# Patient Record
Sex: FEMALE | Race: WHITE | NOT HISPANIC OR LATINO
[De-identification: names, ages, dates, MRNs, and addresses within clinical notes are randomized per-mention and may not be internally consistent; named-entity substitution may affect disease eponyms.]

---

## 2017-02-01 ENCOUNTER — APPOINTMENT (OUTPATIENT)
Dept: CARDIOLOGY | Facility: CLINIC | Age: 82
End: 2017-02-01

## 2017-02-01 VITALS
DIASTOLIC BLOOD PRESSURE: 80 MMHG | WEIGHT: 202 LBS | BODY MASS INDEX: 34.49 KG/M2 | HEIGHT: 64 IN | SYSTOLIC BLOOD PRESSURE: 126 MMHG | HEART RATE: 77 BPM

## 2017-02-02 ENCOUNTER — RECORD ABSTRACTING (OUTPATIENT)
Age: 82
End: 2017-02-02

## 2017-02-02 DIAGNOSIS — Z92.89 PERSONAL HISTORY OF OTHER MEDICAL TREATMENT: ICD-10-CM

## 2017-02-02 DIAGNOSIS — Z98.890 OTHER SPECIFIED POSTPROCEDURAL STATES: ICD-10-CM

## 2017-02-02 DIAGNOSIS — I27.2 OTHER SECONDARY PULMONARY HYPERTENSION: ICD-10-CM

## 2017-06-07 ENCOUNTER — APPOINTMENT (OUTPATIENT)
Dept: CARDIOLOGY | Facility: CLINIC | Age: 82
End: 2017-06-07

## 2017-06-07 VITALS
HEART RATE: 77 BPM | BODY MASS INDEX: 34.83 KG/M2 | HEIGHT: 64 IN | WEIGHT: 204 LBS | SYSTOLIC BLOOD PRESSURE: 122 MMHG | DIASTOLIC BLOOD PRESSURE: 76 MMHG

## 2017-10-25 ENCOUNTER — APPOINTMENT (OUTPATIENT)
Dept: CARDIOLOGY | Facility: CLINIC | Age: 82
End: 2017-10-25

## 2017-10-25 VITALS
HEIGHT: 64 IN | SYSTOLIC BLOOD PRESSURE: 130 MMHG | WEIGHT: 204 LBS | HEART RATE: 72 BPM | BODY MASS INDEX: 34.83 KG/M2 | DIASTOLIC BLOOD PRESSURE: 80 MMHG

## 2017-10-28 ENCOUNTER — MOBILE ON CALL (OUTPATIENT)
Age: 82
End: 2017-10-28

## 2018-02-07 ENCOUNTER — APPOINTMENT (OUTPATIENT)
Dept: CARDIOLOGY | Facility: CLINIC | Age: 83
End: 2018-02-07

## 2018-02-07 VITALS
HEIGHT: 64 IN | WEIGHT: 202 LBS | BODY MASS INDEX: 34.49 KG/M2 | DIASTOLIC BLOOD PRESSURE: 80 MMHG | HEART RATE: 75 BPM | SYSTOLIC BLOOD PRESSURE: 128 MMHG

## 2018-04-25 ENCOUNTER — APPOINTMENT (OUTPATIENT)
Dept: CARDIOLOGY | Facility: CLINIC | Age: 83
End: 2018-04-25

## 2018-06-20 ENCOUNTER — APPOINTMENT (OUTPATIENT)
Dept: CARDIOLOGY | Facility: CLINIC | Age: 83
End: 2018-06-20

## 2018-06-20 VITALS
WEIGHT: 209 LBS | HEART RATE: 72 BPM | DIASTOLIC BLOOD PRESSURE: 76 MMHG | BODY MASS INDEX: 35.68 KG/M2 | SYSTOLIC BLOOD PRESSURE: 124 MMHG | HEIGHT: 64 IN

## 2018-09-27 ENCOUNTER — OUTPATIENT (OUTPATIENT)
Dept: OUTPATIENT SERVICES | Facility: HOSPITAL | Age: 83
LOS: 1 days | Discharge: HOME | End: 2018-09-27

## 2018-09-27 DIAGNOSIS — R74.8 ABNORMAL LEVELS OF OTHER SERUM ENZYMES: ICD-10-CM

## 2018-11-14 ENCOUNTER — MEDICATION RENEWAL (OUTPATIENT)
Age: 83
End: 2018-11-14

## 2019-02-27 ENCOUNTER — APPOINTMENT (OUTPATIENT)
Dept: CARDIOLOGY | Facility: CLINIC | Age: 84
End: 2019-02-27

## 2019-03-27 ENCOUNTER — APPOINTMENT (OUTPATIENT)
Dept: CARDIOLOGY | Facility: CLINIC | Age: 84
End: 2019-03-27

## 2019-03-27 VITALS
SYSTOLIC BLOOD PRESSURE: 132 MMHG | DIASTOLIC BLOOD PRESSURE: 80 MMHG | WEIGHT: 209 LBS | BODY MASS INDEX: 35.68 KG/M2 | HEIGHT: 64 IN | HEART RATE: 81 BPM

## 2019-03-27 DIAGNOSIS — Z87.891 PERSONAL HISTORY OF NICOTINE DEPENDENCE: ICD-10-CM

## 2019-03-27 DIAGNOSIS — I71.9 AORTIC ANEURYSM OF UNSPECIFIED SITE, W/OUT RUPTURE: ICD-10-CM

## 2019-03-27 NOTE — PHYSICAL EXAM
[General Appearance - Well Developed] : well developed [Normal Appearance] : normal appearance [Well Groomed] : well groomed [General Appearance - Well Nourished] : well nourished [No Deformities] : no deformities [General Appearance - In No Acute Distress] : no acute distress [Normal Conjunctiva] : the conjunctiva exhibited no abnormalities [Eyelids - No Xanthelasma] : the eyelids demonstrated no xanthelasmas [Normal Oral Mucosa] : normal oral mucosa [No Oral Pallor] : no oral pallor [No Oral Cyanosis] : no oral cyanosis [Normal Jugular Venous A Waves Present] : normal jugular venous A waves present [Normal Jugular Venous V Waves Present] : normal jugular venous V waves present [No Jugular Venous King A Waves] : no jugular venous king A waves [Respiration, Rhythm And Depth] : normal respiratory rhythm and effort [Exaggerated Use Of Accessory Muscles For Inspiration] : no accessory muscle use [Auscultation Breath Sounds / Voice Sounds] : lungs were clear to auscultation bilaterally [Heart Rate And Rhythm] : heart rate and rhythm were normal [Heart Sounds] : normal S1 and S2 [Murmurs] : no murmurs present [Abdomen Soft] : soft [Abdomen Tenderness] : non-tender [Abdomen Mass (___ Cm)] : no abdominal mass palpated [Abnormal Walk] : normal gait [Gait - Sufficient For Exercise Testing] : the gait was sufficient for exercise testing [Nail Clubbing] : no clubbing of the fingernails [Cyanosis, Localized] : no localized cyanosis [Petechial Hemorrhages (___cm)] : no petechial hemorrhages [Skin Color & Pigmentation] : normal skin color and pigmentation [] : no rash [No Venous Stasis] : no venous stasis [Skin Lesions] : no skin lesions [No Skin Ulcers] : no skin ulcer [No Xanthoma] : no  xanthoma was observed [Oriented To Time, Place, And Person] : oriented to person, place, and time [Affect] : the affect was normal [Mood] : the mood was normal [No Anxiety] : not feeling anxious

## 2019-03-31 NOTE — REASON FOR VISIT
[Follow-Up - Clinic] : a clinic follow-up of [Coronary Artery Disease] : coronary artery disease [FreeTextEntry2] : cad, aneursym [FreeTextEntry1] : s/p avr bioprosthetic\par echo 6/2016 normal prosthetic valve\par ef approx. 40%\par ekg lbbb\par Patient at the present time complains of  shortness of breath\par She denies chest pain\par Since her last visit there have been no new symptoms\par She denies presyncope, syncope, palpitations  or exertional chest discomfort.\par Review of systems is essentially negative\par no change in 4 mos\par no new complaints\par no chf\par stable\par obese\par no new complaints\par patient has developed alzhemer's\par brittany\par stable

## 2019-06-01 ENCOUNTER — OUTPATIENT (OUTPATIENT)
Dept: OUTPATIENT SERVICES | Facility: HOSPITAL | Age: 84
LOS: 1 days | End: 2019-06-01
Payer: MEDICARE

## 2019-06-08 ENCOUNTER — INPATIENT (INPATIENT)
Facility: HOSPITAL | Age: 84
LOS: 3 days | Discharge: NURSING, REHAB OR LONG-TERM CARE | End: 2019-06-12
Attending: INTERNAL MEDICINE | Admitting: INTERNAL MEDICINE
Payer: MEDICARE

## 2019-06-08 VITALS
TEMPERATURE: 96 F | OXYGEN SATURATION: 95 % | DIASTOLIC BLOOD PRESSURE: 73 MMHG | SYSTOLIC BLOOD PRESSURE: 131 MMHG | HEART RATE: 86 BPM | RESPIRATION RATE: 16 BRPM

## 2019-06-08 DIAGNOSIS — Z98.890 OTHER SPECIFIED POSTPROCEDURAL STATES: Chronic | ICD-10-CM

## 2019-06-08 DIAGNOSIS — Z90.49 ACQUIRED ABSENCE OF OTHER SPECIFIED PARTS OF DIGESTIVE TRACT: Chronic | ICD-10-CM

## 2019-06-08 LAB
ALBUMIN SERPL ELPH-MCNC: 3.5 G/DL — SIGNIFICANT CHANGE UP (ref 3.5–5.2)
ALP SERPL-CCNC: 54 U/L — SIGNIFICANT CHANGE UP (ref 30–115)
ALT FLD-CCNC: 24 U/L — SIGNIFICANT CHANGE UP (ref 0–41)
ANION GAP SERPL CALC-SCNC: 13 MMOL/L — SIGNIFICANT CHANGE UP (ref 7–14)
APPEARANCE UR: CLEAR — SIGNIFICANT CHANGE UP
AST SERPL-CCNC: 32 U/L — SIGNIFICANT CHANGE UP (ref 0–41)
BACTERIA # UR AUTO: ABNORMAL /HPF
BASE EXCESS BLDV CALC-SCNC: -0.5 MMOL/L — SIGNIFICANT CHANGE UP (ref -2–2)
BASOPHILS # BLD AUTO: 0.07 K/UL — SIGNIFICANT CHANGE UP (ref 0–0.2)
BASOPHILS NFR BLD AUTO: 0.6 % — SIGNIFICANT CHANGE UP (ref 0–1)
BILIRUB SERPL-MCNC: 0.9 MG/DL — SIGNIFICANT CHANGE UP (ref 0.2–1.2)
BILIRUB UR-MCNC: ABNORMAL
BUN SERPL-MCNC: 14 MG/DL — SIGNIFICANT CHANGE UP (ref 10–20)
CA-I SERPL-SCNC: 1.13 MMOL/L — SIGNIFICANT CHANGE UP (ref 1.12–1.3)
CALCIUM SERPL-MCNC: 8.5 MG/DL — SIGNIFICANT CHANGE UP (ref 8.5–10.1)
CHLORIDE SERPL-SCNC: 101 MMOL/L — SIGNIFICANT CHANGE UP (ref 98–110)
CO2 SERPL-SCNC: 21 MMOL/L — SIGNIFICANT CHANGE UP (ref 17–32)
COLOR SPEC: SIGNIFICANT CHANGE UP
CREAT SERPL-MCNC: 0.8 MG/DL — SIGNIFICANT CHANGE UP (ref 0.7–1.5)
DIFF PNL FLD: ABNORMAL
EOSINOPHIL # BLD AUTO: 0.14 K/UL — SIGNIFICANT CHANGE UP (ref 0–0.7)
EOSINOPHIL NFR BLD AUTO: 1.1 % — SIGNIFICANT CHANGE UP (ref 0–8)
EPI CELLS # UR: ABNORMAL /HPF
GAS PNL BLDV: 136 MMOL/L — SIGNIFICANT CHANGE UP (ref 136–145)
GAS PNL BLDV: SIGNIFICANT CHANGE UP
GLUCOSE SERPL-MCNC: 117 MG/DL — HIGH (ref 70–99)
GLUCOSE UR QL: NEGATIVE MG/DL — SIGNIFICANT CHANGE UP
HCO3 BLDV-SCNC: 25 MMOL/L — SIGNIFICANT CHANGE UP (ref 22–29)
HCT VFR BLD CALC: 43.2 % — SIGNIFICANT CHANGE UP (ref 37–47)
HCT VFR BLDA CALC: 48.5 % — HIGH (ref 34–44)
HGB BLD CALC-MCNC: 15.8 G/DL — SIGNIFICANT CHANGE UP (ref 14–18)
HGB BLD-MCNC: 15 G/DL — SIGNIFICANT CHANGE UP (ref 12–16)
IMM GRANULOCYTES NFR BLD AUTO: 0.4 % — HIGH (ref 0.1–0.3)
KETONES UR-MCNC: ABNORMAL
LACTATE BLDV-MCNC: 2.1 MMOL/L — HIGH (ref 0.5–1.6)
LEUKOCYTE ESTERASE UR-ACNC: NEGATIVE — SIGNIFICANT CHANGE UP
LYMPHOCYTES # BLD AUTO: 37.6 % — SIGNIFICANT CHANGE UP (ref 20.5–51.1)
LYMPHOCYTES # BLD AUTO: 4.59 K/UL — HIGH (ref 1.2–3.4)
MCHC RBC-ENTMCNC: 30.6 PG — SIGNIFICANT CHANGE UP (ref 27–31)
MCHC RBC-ENTMCNC: 34.7 G/DL — SIGNIFICANT CHANGE UP (ref 32–37)
MCV RBC AUTO: 88.2 FL — SIGNIFICANT CHANGE UP (ref 81–99)
MONOCYTES # BLD AUTO: 1.1 K/UL — HIGH (ref 0.1–0.6)
MONOCYTES NFR BLD AUTO: 9 % — SIGNIFICANT CHANGE UP (ref 1.7–9.3)
NEUTROPHILS # BLD AUTO: 6.25 K/UL — SIGNIFICANT CHANGE UP (ref 1.4–6.5)
NEUTROPHILS NFR BLD AUTO: 51.3 % — SIGNIFICANT CHANGE UP (ref 42.2–75.2)
NITRITE UR-MCNC: NEGATIVE — SIGNIFICANT CHANGE UP
NRBC # BLD: 0 /100 WBCS — SIGNIFICANT CHANGE UP (ref 0–0)
PCO2 BLDV: 42 MMHG — SIGNIFICANT CHANGE UP (ref 41–51)
PH BLDV: 7.38 — SIGNIFICANT CHANGE UP (ref 7.26–7.43)
PH UR: 5 — SIGNIFICANT CHANGE UP (ref 5–8)
PLATELET # BLD AUTO: 149 K/UL — SIGNIFICANT CHANGE UP (ref 130–400)
PO2 BLDV: 38 MMHG — SIGNIFICANT CHANGE UP (ref 20–40)
POTASSIUM BLDV-SCNC: 3.7 MMOL/L — SIGNIFICANT CHANGE UP (ref 3.3–5.6)
POTASSIUM SERPL-MCNC: 4.6 MMOL/L — SIGNIFICANT CHANGE UP (ref 3.5–5)
POTASSIUM SERPL-SCNC: 4.6 MMOL/L — SIGNIFICANT CHANGE UP (ref 3.5–5)
PROT SERPL-MCNC: 7.6 G/DL — SIGNIFICANT CHANGE UP (ref 6–8)
PROT UR-MCNC: ABNORMAL MG/DL
RBC # BLD: 4.9 M/UL — SIGNIFICANT CHANGE UP (ref 4.2–5.4)
RBC # FLD: 15.1 % — HIGH (ref 11.5–14.5)
RBC CASTS # UR COMP ASSIST: SIGNIFICANT CHANGE UP /HPF
SAO2 % BLDV: 72 % — SIGNIFICANT CHANGE UP
SODIUM SERPL-SCNC: 135 MMOL/L — SIGNIFICANT CHANGE UP (ref 135–146)
SP GR SPEC: 1.02 — SIGNIFICANT CHANGE UP (ref 1.01–1.03)
TROPONIN T SERPL-MCNC: <0.01 NG/ML — SIGNIFICANT CHANGE UP
UROBILINOGEN FLD QL: 1 MG/DL (ref 0.2–0.2)
WBC # BLD: 12.2 K/UL — HIGH (ref 4.8–10.8)
WBC # FLD AUTO: 12.2 K/UL — HIGH (ref 4.8–10.8)
WBC UR QL: SIGNIFICANT CHANGE UP /HPF

## 2019-06-08 PROCEDURE — 99285 EMERGENCY DEPT VISIT HI MDM: CPT

## 2019-06-08 PROCEDURE — 70450 CT HEAD/BRAIN W/O DYE: CPT | Mod: 26

## 2019-06-08 PROCEDURE — 71045 X-RAY EXAM CHEST 1 VIEW: CPT | Mod: 26

## 2019-06-08 PROCEDURE — 93010 ELECTROCARDIOGRAM REPORT: CPT

## 2019-06-08 RX ORDER — ENOXAPARIN SODIUM 100 MG/ML
40 INJECTION SUBCUTANEOUS DAILY
Refills: 0 | Status: DISCONTINUED | OUTPATIENT
Start: 2019-06-08 | End: 2019-06-12

## 2019-06-08 RX ORDER — FUROSEMIDE 40 MG
20 TABLET ORAL EVERY 24 HOURS
Refills: 0 | Status: DISCONTINUED | OUTPATIENT
Start: 2019-06-08 | End: 2019-06-09

## 2019-06-08 RX ORDER — LISINOPRIL 2.5 MG/1
10 TABLET ORAL DAILY
Refills: 0 | Status: DISCONTINUED | OUTPATIENT
Start: 2019-06-08 | End: 2019-06-12

## 2019-06-08 RX ORDER — SODIUM CHLORIDE 9 MG/ML
500 INJECTION, SOLUTION INTRAVENOUS ONCE
Refills: 0 | Status: COMPLETED | OUTPATIENT
Start: 2019-06-08 | End: 2019-06-08

## 2019-06-08 RX ORDER — ASPIRIN/CALCIUM CARB/MAGNESIUM 324 MG
81 TABLET ORAL DAILY
Refills: 0 | Status: DISCONTINUED | OUTPATIENT
Start: 2019-06-08 | End: 2019-06-12

## 2019-06-08 RX ORDER — LANOLIN ALCOHOL/MO/W.PET/CERES
5 CREAM (GRAM) TOPICAL AT BEDTIME
Refills: 0 | Status: DISCONTINUED | OUTPATIENT
Start: 2019-06-08 | End: 2019-06-12

## 2019-06-08 RX ORDER — TIOTROPIUM BROMIDE 18 UG/1
1 CAPSULE ORAL; RESPIRATORY (INHALATION) DAILY
Refills: 0 | Status: DISCONTINUED | OUTPATIENT
Start: 2019-06-08 | End: 2019-06-12

## 2019-06-08 RX ORDER — CARVEDILOL PHOSPHATE 80 MG/1
6.25 CAPSULE, EXTENDED RELEASE ORAL DAILY
Refills: 0 | Status: DISCONTINUED | OUTPATIENT
Start: 2019-06-08 | End: 2019-06-12

## 2019-06-08 RX ORDER — SERTRALINE 25 MG/1
50 TABLET, FILM COATED ORAL DAILY
Refills: 0 | Status: DISCONTINUED | OUTPATIENT
Start: 2019-06-08 | End: 2019-06-12

## 2019-06-08 RX ORDER — CIPROFLOXACIN LACTATE 400MG/40ML
500 VIAL (ML) INTRAVENOUS ONCE
Refills: 0 | Status: COMPLETED | OUTPATIENT
Start: 2019-06-08 | End: 2019-06-08

## 2019-06-08 RX ADMIN — SODIUM CHLORIDE 500 MILLILITER(S): 9 INJECTION, SOLUTION INTRAVENOUS at 17:13

## 2019-06-08 RX ADMIN — Medication 500 MILLIGRAM(S): at 19:29

## 2019-06-08 RX ADMIN — Medication 5 MILLIGRAM(S): at 23:20

## 2019-06-08 NOTE — ED PROVIDER NOTE - CLINICAL SUMMARY MEDICAL DECISION MAKING FREE TEXT BOX
pt here for confusion/hallucinations. no severe lyte abnormality, no clear source of infection- ua w/o leuks/nitrite although few bacteria, ctx sent, recent course of cipro. pt unstafe to d/c home, will admit for further w/u, placement

## 2019-06-08 NOTE — PATIENT PROFILE ADULT - NSTOBACCO TYPE_GEN_A_CORE_RD
FINAL REPORT



PROCEDURE:  XR CHEST 1V AP



TECHNIQUE:  Chest radiograph anteroposterior view. CPT 37877







HISTORY:  ETT placement 



COMPARISON:  No prior studies are available for comparison.



FINDINGS:  

Heart: Prominent cardiac silhouette 



Mediastinum/Vessels: Prominent central vessels.



Lungs/Pleural space: Normal. Endotracheal tube tip projects 2.6

centimeters superior to the jorge 



Bony thorax: No acute osseous abnormality.



Life support devices: None.



IMPRESSION:  

Prominent central vessels. Endotracheal tube tip projects 2.6

centimeters superior to the jorge.
Cigarettes

## 2019-06-08 NOTE — H&P ADULT - NSICDXPASTMEDICALHX_GEN_ALL_CORE_FT
PAST MEDICAL HISTORY:  Congestive heart failure     COPD (chronic obstructive pulmonary disease)     Dementia     Hyperlipidemia     Hypertension     Metabolic encephalopathy     Urinary tract infection PAST MEDICAL HISTORY:  Congestive heart failure     COPD (chronic obstructive pulmonary disease)     Dementia     History of left bundle branch block     Hyperlipidemia     Hypertension     Metabolic encephalopathy     Obstructive sleep apnea     Presenile dementia with depression     Urinary tract infection

## 2019-06-08 NOTE — H&P ADULT - NSHPLABSRESULTS_GEN_ALL_CORE
Labs                          15.0   12.20 )-----------( 149      ( 2019 15:35 )             43.2     -    135  |  101  |  14  ----------------------------<  117<H>  4.6   |  21  |  0.8    Ca    8.5      2019 15:35    TPro  7.6  /  Alb  3.5  /  TBili  0.9  /  DBili  x   /  AST  32  /  ALT  24  /  AlkPhos  54      Troponin T, Serum (19 @ 15:35)    Troponin T, Serum: <0.01 ng/mL    Blood Gas Venous - Lactate (19 @ 16:54)    Blood Gas Venous - Lactate: 2.1 mmoL/L    Blood Gas Profile - Venous (19 @ 16:54)    pH, Venous: 7.38    pCO2, Venous: 42 mmHg    pO2, Venous: 38 mmHg    HCO3, Venous: 25 mmoL/L    Base Excess, Venous: -0.5 mmoL/L    Oxygen Saturation, Venous: 72 %    Urinalysis Basic - ( 2019 17:50 )  Color: Dark Yellow / Appearance: Clear / S.025 / pH: x  Gluc: x / Ketone: Trace  / Bili: Small / Urobili: 1.0 mg/dL   Blood: x / Protein: Trace mg/dL / Nitrite: Negative   Leuk Esterase: Negative / RBC: 1-2 /HPF / WBC 1-2 /HPF   Sq Epi: x / Non Sq Epi: Few /HPF / Bacteria: Few /HPF      Radiology  < from: CT Head No Cont (19 @ 18:01) >  IMPRESSION:   1.  No evidence of acute intracranial hemorrhage, mass effect or territorial infarct.  2.  Moderate chronic microvascular changes and chronic lacunar infarcts.  < end of copied text >

## 2019-06-08 NOTE — ED PROVIDER NOTE - ATTENDING CONTRIBUTION TO CARE
85 yo f hx chf, copd, dementia, htn, hld  pt was dx monday over the phone by PMD and was started on ?abx  pt with greenish urine, hallucinating, acting bizarrely   pt lives alone, with aid 8hrs/day  pt without complaints given dementia. daughter states she is concerned patient is risk to self given limited aid hours and dementia.     vss  gen- NAD, aao- self/place  card-rrr  lungs-ctab, no wheezing or rhonchi  abd-sntnd, no guarding or rebound  neuro- full str/sensation, cn ii-xii grossly intact, normal coordination, no fnd    possible metabolic encephalopathy, progression of dementia, infection  will get basic labs, cxr, ua, gentle fluids, cth, admit

## 2019-06-08 NOTE — H&P ADULT - ATTENDING COMMENTS
Patient is seen and examined independently at bedside. I agree with the resident's note, history and plan as above. Spoke with daughter at bedside. Pt lives alone currently without help. Pt's daughter lives far but visit her mother multiple times during the day to help with the needs and occasional get a neighbor friend to watch her. Pt was diagnosed with dementia by depression 9 years ago. Pt had initial improvement in mental status since pt was started on zoloft 9 years back. For past 8 months, pt has progressive decline in her mental status. Pt is becoming more forgetful, has urinary incontinence at baseline but currently having fecal incontinence with feces found around the house.  However, pt is still able to recognize family members, ambulatory but find difficulty with stairs. The reason pt's daughter brought pt to hospital was because pt was found wandering outside of the house. Pt's daughter had difficulty getting patient out of the house for further neuropsychiatric evaluation, the medication has not been changed since 9 years ago and even to get blood work was difficult for the family.    T(F): 96.3 (06-08-19 @ 21:17), Max: 98.6 (06-08-19 @ 15:16)  HR: 80 (06-08-19 @ 21:17) (65 - 86)  BP: 133/68 (06-08-19 @ 21:17) (130/94 - 133/68)  RR: 18 (06-08-19 @ 21:17) (16 - 18)  SpO2: 96% (06-08-19 @ 21:30) (95% - 98%)    PHYSICAL EXAM:  CONSTITUTIONAL: NAD, well-groomed, well-developed.  HEAD:  Atraumatic, Normocephalic.  EYES: EOMI, PERRLA, conjunctiva and sclera clear.  ENMT: Moist mucous membranes, No lesions.  NERVOUS SYSTEM:  Alert & Oriented X1, Good concentration; No limb weakness or numbness. Follows commands.  RESPIRATORY: Clear to ascultation bilaterally; No rales, rhonchi, wheezing, or rubs.  CARDIOVASCULAR: Regular rate and rhythm; No murmurs, rubs, or gallops.  GASTROINTESTINAL: Soft, Nontender, Nondistended;   MUSCULOSKELETAL: No joint swelling or tenderness. No neck or back pain.  EXTREMITIES: No clubbing, cyanosis, or edema.  LYMPH: No cervical lymphadenopathy noted.  SKIN: No rashes or lesions.    # Inability to care for oneself, worsening forgetfulness  - worsening dementia vs. metabolic encephalopathy 2/2 recent infection vs. underlying psychiatric disorder  - check TSH, ammonia, vitamin B12  - neurology and psychiatric evaluation to check if there is reversible causes or if medication adjustment needed  - social work/case management evaluation for placement, pt's daughter would like to keep the patient at home if possible if she can get help    # Recent UTI - finished 5 day course of ciprofloxacin, monitor for symptoms  # Congestive heart failure, unknown EF, stable - c/w lasix 3 times a week  # COPD, stable - c/w spiriva  # JAVI - unable to tolerate CPAP  # HTN - c/w ACE-inhibitor and carvedilol  # Depression - c/w sertraline    All labs, radiologic studies, vitals, orders and medications list reviewed.

## 2019-06-08 NOTE — ED ADULT TRIAGE NOTE - CHIEF COMPLAINT QUOTE
As per EMS, pt diagnosed with UTI on Monday (started on abx) c/o weakness and change in mental status. Hx of dementia, as per family, worsening mental status since Thursday. Pt A&Ox2

## 2019-06-08 NOTE — H&P ADULT - NSHPOUTPATIENTPROVIDERS_GEN_ALL_CORE
Titi Trejo [phone] Healthcare proxy  PMD: Dr Robb Hoffman  Cardio: Dr Shilo Kaur  Pulm: Dr Reynolds

## 2019-06-08 NOTE — ED ADULT NURSE NOTE - OBJECTIVE STATEMENT
pt brought in from home c.o. ams "she is seeing things that are not there and not being herself". s/p uti on abx. lives alone at home.

## 2019-06-08 NOTE — ED ADULT NURSE NOTE - PMH
Congestive heart failure    COPD (chronic obstructive pulmonary disease)    Dementia    History of left bundle branch block    Hyperlipidemia    Hypertension    Metabolic encephalopathy    Obstructive sleep apnea    Presenile dementia with depression    Urinary tract infection

## 2019-06-08 NOTE — ED PROVIDER NOTE - OBJECTIVE STATEMENT
83 yo female with PMHx COPD, CHF, dementia, HTN, HLD presents for change in mental status. Patient was dx with UTI on monday (PMD did not send urine but based on symptoms) and was started on ciprofloxacin. Patient has been having hallucinations and is agitated and more confused than normal. Patient lives at home with only 8 hour aid - family does not feel she is safe to be home on her own anymore. Patient denies any complaints and states she is fine and wants to go home. Patient/family denies falls, head trauma, syncope, fevers, chest pain, SOB, abdominal pain, nausea, vomiting, diarrhea, dizziness, weakness, urinary sx, cough, congestion, changes in PO intake, changes in urine output.

## 2019-06-08 NOTE — H&P ADULT - NSHPSOCIALHISTORY_GEN_ALL_CORE
Former smoker; quit 26 years ago  No alcohol intake  No illicit drugs    Lives alone, no one to help at home

## 2019-06-08 NOTE — H&P ADULT - ASSESSMENT
Dementia; Unable to care for herself    CHF  - Stable  - Weight daily, HOB elevated 30-45, fluid intake < 1.5L/day, low Na, aspiration precautions    COPD; stable    HTN    HLD    DVT ppx;  GI ppx; no indication  Activity; Ambulate as tolerated, fall precautions  Dispo; 24 hours 84 year old female with a past medical history of Dementia (8 years), Depression, JAVI, COPD, CHF, history of LBBB, recent Metabolic encephalopathy secondary to UTI treated with Cipro with improvement, presenting for change in her baseline mental status.    Dementia with Depression (for the past 8 years); Unable to care for herself  - No source of infection; treated for UTI recently  - ED: LR 500cc  - Clinically and hemodynamically stable  - CTH negative  - PT/OT/PMR, Social work consult    Recent UTI; 5 days of Cipro complete with one stat dose of Cipro.    CHF, unknown if systolic or diastolic  - Stable. Follows with cardiologist.  - Lasix 20mg every other day (3 times a week)  - Weight daily, HOB elevated 30-45, fluid intake < 1.5L/day, low Na, aspiration precautions    COPD; patient has not been taking her Spiriva for 2 years due to insurance issues. Currently COPD is stable. Discharge with Pulmonary follow up Dr Spence or Dr Josse Sutherland    JAVI; patient has not been able to tolerate CPAP 2 years prior. Needs outpatient Pulmonary follow up for new masks and new recommendations that the patient might be compliant with.    HTN; lisinopril 10, carvedilol 6.25 once a day    HLD; was on simvastatin but had elevated LFTs. Proper diet.    Depression; Sertraline    Med rec confirmed with patient's daughter  DVT ppx; Lovenox  GI ppx; no indication  Activity; Ambulate as tolerated, fall precautions  Dispo; 24 hours

## 2019-06-08 NOTE — H&P ADULT - NSHPPHYSICALEXAM_GEN_ALL_CORE
Vital Signs Last 24 Hrs  T(C): 37 (08 Jun 2019 15:16), Max: 37 (08 Jun 2019 15:16)  T(F): 98.6 (08 Jun 2019 15:16), Max: 98.6 (08 Jun 2019 15:16)  HR: 65 (08 Jun 2019 15:16) (65 - 86)  BP: 130/94 (08 Jun 2019 15:16) (130/94 - 131/73)  BP(mean): --  RR: 18 (08 Jun 2019 15:16) (16 - 18)  SpO2: 98% (08 Jun 2019 15:16) (95% - 98%)    Gen: NAD  HEENT: NCAT, EOMI, PERRLA  Cardiac: S1 S2, RRR  Chest: CTAB, GBAE  Abd: +BS, soft, NTND  Ext: +2 PP b/l, -ve LLE b/l  Neuro: AAOx3 Vital Signs Last 24 Hrs  T(C): 37 (08 Jun 2019 15:16), Max: 37 (08 Jun 2019 15:16)  T(F): 98.6 (08 Jun 2019 15:16), Max: 98.6 (08 Jun 2019 15:16)  HR: 65 (08 Jun 2019 15:16) (65 - 86)  BP: 130/94 (08 Jun 2019 15:16) (130/94 - 131/73)  BP(mean): --  RR: 18 (08 Jun 2019 15:16) (16 - 18)  SpO2: 98% (08 Jun 2019 15:16) (95% - 98%)    Gen: NAD  HEENT: NCAT, EOMI, PERRLA  Cardiac: S1 S2, RRR  Chest: CTAB, GBAE  Abd: +BS, soft, NTND  Ext: +2 PP b/l, -ve LLE b/l  Neuro: AAOx1 (knows person), no focal deficits

## 2019-06-08 NOTE — ED PROVIDER NOTE - PHYSICAL EXAMINATION
GEN: Well appearing, in no apparent distress.    HEAD:  Normocephalic, atraumatic.    EYES:  Clear conjunctivae without injection, drainage or discharge.    ENMT:  Moist MM.    NECK:  Supple, no masses. Normal ROM.    CARDIAC:  RRR, normal S1 and S2, no murmurs, rubs or gallops.    RESP:  Respiratory rate and effort appear normal; lungs are clear to auscultation bilaterally; no rhonchi, rales or wheezes.    ABDOMEN:  Soft, non-tender, non-distended, no masses. Normal BS throughout.    MUSCULOSKELETAL: No leg swelling, no calf tenderness. Patient able to move all 4 extremities.  NEURO:  AAO x 2 (baseline). Motor 5/5. Sensory intact. CN II – XII intact.     SKIN:  Normal skin color for age and race, well-perfused; warm and dry.

## 2019-06-08 NOTE — H&P ADULT - HISTORY OF PRESENT ILLNESS
84 year old female with a past medical history of Dementia (8 years), Depression, JAVI, COPD, CHF, history of LBBB, recent Metabolic encephalopathy secondary to UTI treated with Cipro with improvement, presenting for change in her baseline mental status. Patient's daughter reports that she has been constantly sleeping for the past 2 weeks more than usual, with decreased strength to move from bed to chair.  Daughter spoke to PMD, patient was started on Ciprofloxacin for UTI. Daughter reports that patient had dysuria.  After the cipro, patient's urine's color and smell retuned to normal. Patient's daughter reports that she had some episodes of hallucinations and was not acting like herself described as "worsening dementia" and described as "not her normal dementia". Denies fever, chills, nausea, vomiting, diarrhea, decreased PO intake, chest pain, palpitations, shortness of breath, abdominal pain, lower extremity swelling/pain/tenderness. Daughter reports some difficulty going up and down the stairs.

## 2019-06-09 LAB
ALBUMIN SERPL ELPH-MCNC: 3.3 G/DL — LOW (ref 3.5–5.2)
ALP SERPL-CCNC: 62 U/L — SIGNIFICANT CHANGE UP (ref 30–115)
ALT FLD-CCNC: 22 U/L — SIGNIFICANT CHANGE UP (ref 0–41)
AMMONIA BLD-MCNC: 30 UMOL/L — SIGNIFICANT CHANGE UP (ref 11–55)
ANION GAP SERPL CALC-SCNC: 13 MMOL/L — SIGNIFICANT CHANGE UP (ref 7–14)
APTT BLD: 34.8 SEC — SIGNIFICANT CHANGE UP (ref 27–39.2)
AST SERPL-CCNC: 24 U/L — SIGNIFICANT CHANGE UP (ref 0–41)
BASOPHILS # BLD AUTO: 0.05 K/UL — SIGNIFICANT CHANGE UP (ref 0–0.2)
BASOPHILS NFR BLD AUTO: 0.5 % — SIGNIFICANT CHANGE UP (ref 0–1)
BILIRUB SERPL-MCNC: 0.7 MG/DL — SIGNIFICANT CHANGE UP (ref 0.2–1.2)
BUN SERPL-MCNC: 14 MG/DL — SIGNIFICANT CHANGE UP (ref 10–20)
CALCIUM SERPL-MCNC: 8.7 MG/DL — SIGNIFICANT CHANGE UP (ref 8.5–10.1)
CHLORIDE SERPL-SCNC: 99 MMOL/L — SIGNIFICANT CHANGE UP (ref 98–110)
CO2 SERPL-SCNC: 23 MMOL/L — SIGNIFICANT CHANGE UP (ref 17–32)
CREAT SERPL-MCNC: 0.8 MG/DL — SIGNIFICANT CHANGE UP (ref 0.7–1.5)
EOSINOPHIL # BLD AUTO: 0.15 K/UL — SIGNIFICANT CHANGE UP (ref 0–0.7)
EOSINOPHIL NFR BLD AUTO: 1.5 % — SIGNIFICANT CHANGE UP (ref 0–8)
GLUCOSE SERPL-MCNC: 153 MG/DL — HIGH (ref 70–99)
HCT VFR BLD CALC: 43.5 % — SIGNIFICANT CHANGE UP (ref 37–47)
HGB BLD-MCNC: 14.9 G/DL — SIGNIFICANT CHANGE UP (ref 12–16)
IMM GRANULOCYTES NFR BLD AUTO: 0.4 % — HIGH (ref 0.1–0.3)
INR BLD: 1.25 RATIO — SIGNIFICANT CHANGE UP (ref 0.65–1.3)
LYMPHOCYTES # BLD AUTO: 3.48 K/UL — HIGH (ref 1.2–3.4)
LYMPHOCYTES # BLD AUTO: 35.7 % — SIGNIFICANT CHANGE UP (ref 20.5–51.1)
MAGNESIUM SERPL-MCNC: 1.8 MG/DL — SIGNIFICANT CHANGE UP (ref 1.8–2.4)
MCHC RBC-ENTMCNC: 30.7 PG — SIGNIFICANT CHANGE UP (ref 27–31)
MCHC RBC-ENTMCNC: 34.3 G/DL — SIGNIFICANT CHANGE UP (ref 32–37)
MCV RBC AUTO: 89.5 FL — SIGNIFICANT CHANGE UP (ref 81–99)
MONOCYTES # BLD AUTO: 0.7 K/UL — HIGH (ref 0.1–0.6)
MONOCYTES NFR BLD AUTO: 7.2 % — SIGNIFICANT CHANGE UP (ref 1.7–9.3)
NEUTROPHILS # BLD AUTO: 5.34 K/UL — SIGNIFICANT CHANGE UP (ref 1.4–6.5)
NEUTROPHILS NFR BLD AUTO: 54.7 % — SIGNIFICANT CHANGE UP (ref 42.2–75.2)
NRBC # BLD: 0 /100 WBCS — SIGNIFICANT CHANGE UP (ref 0–0)
PLATELET # BLD AUTO: 125 K/UL — LOW (ref 130–400)
POTASSIUM SERPL-MCNC: 4.1 MMOL/L — SIGNIFICANT CHANGE UP (ref 3.5–5)
POTASSIUM SERPL-SCNC: 4.1 MMOL/L — SIGNIFICANT CHANGE UP (ref 3.5–5)
PROT SERPL-MCNC: 7.4 G/DL — SIGNIFICANT CHANGE UP (ref 6–8)
PROTHROM AB SERPL-ACNC: 14.4 SEC — HIGH (ref 9.95–12.87)
RBC # BLD: 4.86 M/UL — SIGNIFICANT CHANGE UP (ref 4.2–5.4)
RBC # FLD: 15.2 % — HIGH (ref 11.5–14.5)
SODIUM SERPL-SCNC: 135 MMOL/L — SIGNIFICANT CHANGE UP (ref 135–146)
WBC # BLD: 9.76 K/UL — SIGNIFICANT CHANGE UP (ref 4.8–10.8)
WBC # FLD AUTO: 9.76 K/UL — SIGNIFICANT CHANGE UP (ref 4.8–10.8)

## 2019-06-09 PROCEDURE — 99222 1ST HOSP IP/OBS MODERATE 55: CPT

## 2019-06-09 RX ORDER — FUROSEMIDE 40 MG
20 TABLET ORAL
Refills: 0 | Status: DISCONTINUED | OUTPATIENT
Start: 2019-06-09 | End: 2019-06-12

## 2019-06-09 RX ADMIN — LISINOPRIL 10 MILLIGRAM(S): 2.5 TABLET ORAL at 05:40

## 2019-06-09 RX ADMIN — CARVEDILOL PHOSPHATE 6.25 MILLIGRAM(S): 80 CAPSULE, EXTENDED RELEASE ORAL at 05:40

## 2019-06-09 RX ADMIN — ENOXAPARIN SODIUM 40 MILLIGRAM(S): 100 INJECTION SUBCUTANEOUS at 11:12

## 2019-06-09 RX ADMIN — SERTRALINE 50 MILLIGRAM(S): 25 TABLET, FILM COATED ORAL at 11:12

## 2019-06-09 RX ADMIN — Medication 81 MILLIGRAM(S): at 11:12

## 2019-06-09 NOTE — CONSULT NOTE ADULT - ASSESSMENT
85 yo female with pmh as stated above presents with worsening mental status and UTI which is being treated. CTH is negative for acute ischemia.   Patient with worsening dementia 83 yo female with longstanding history of dementia currently with  worsening mental status and UTI which is being treated.  Likely toxic/metabolic encephalopathy with underlying moderate to advanced dementia.      Plan:  - continue UTI treatment as per primary team  - start Seroquel 25 mg QHS for agitation  - donepizil 10 mg QD  - check TSH, B12, folate, RPR  - PT eval  - call w/ questions  - f/u as outpt for titration of medications

## 2019-06-09 NOTE — CONSULT NOTE ADULT - SUBJECTIVE AND OBJECTIVE BOX
Neurology Consult    Patient is a 84y old  Female who presents with a chief complaint of unable to care for herself (2019 18:23)      HPI:  84 year old female with a past medical history of Dementia (8 years), Depression, JAVI, COPD, CHF, history of LBBB, recent Metabolic encephalopathy secondary to UTI treated with Cipro with improvement, presenting for change in her baseline mental status. Patient's daughter reports that she has been constantly sleeping for the past 2 weeks more than usual, with decreased strength to move from bed to chair.  Daughter spoke to PMD, patient was started on Ciprofloxacin for UTI. Daughter reports that patient had dysuria.  After the cipro, patient's urine's color and smell retuned to normal. Patient's daughter reports that she had some episodes of hallucinations and was not acting like herself described as "worsening dementia" and described as "not her normal dementia". Denies fever, chills, nausea, vomiting, diarrhea, decreased PO intake, chest pain, palpitations, shortness of breath, abdominal pain, lower extremity swelling/pain/tenderness. Daughter reports some difficulty going up and down the stairs. (2019 18:23)    Patient is examine at the bedside. She is aaox2, to herself and place. She is able to follow commands, able to name and repeat but does not remember things, could not name the president and current months or date. She is moving all four extremities slightly weakener on the right. Denies headache, dizziness or LOB.  As per daughter was found outside the house screaming at the neighbor very agitated. Lately she has been having visual hallucinations as well.    PAST MEDICAL & SURGICAL HISTORY:  Obstructive sleep apnea  Presenile dementia with depression  History of left bundle branch block  Metabolic encephalopathy  Urinary tract infection  Dementia  Hyperlipidemia  Hypertension  COPD (chronic obstructive pulmonary disease)  Congestive heart failure  S/P removal of ovarian cyst  H/O lumpectomy  History of appendectomy      FAMILY HISTORY:  FHx: lung cancer: father      Social History: (-) x 3    Allergies    No Known Allergies    Intolerances        MEDICATIONS  (STANDING):  aspirin enteric coated 81 milliGRAM(s) Oral daily  carvedilol 6.25 milliGRAM(s) Oral daily  enoxaparin Injectable 40 milliGRAM(s) SubCutaneous daily  furosemide    Tablet 20 milliGRAM(s) Oral <User Schedule>  lisinopril 10 milliGRAM(s) Oral daily  melatonin 5 milliGRAM(s) Oral at bedtime  sertraline 50 milliGRAM(s) Oral daily  tiotropium 18 MICROgram(s) Capsule 1 Capsule(s) Inhalation daily    MEDICATIONS  (PRN):      Review of systems:    Constitutional: No fever, weight loss or fatigue    Eyes: No eye pain or discharge  ENMT:  No difficulty hearing; No sinus or throat pain  Neck: No pain or stiffness  Respiratory: No cough, wheezing, chills or hemoptysis  Cardiovascular: No chest pain, palpitations, shortness of breath, dyspnea on exertion  Gastrointestinal: No abdominal pain, nausea, vomiting or hematemesis; No diarrhea or constipation.   Genitourinary: No dysuria, frequency, hematuria or incontinence  Neurological: As per HPI  Skin: No rashes or lesions   Endocrine: No heat or cold intolerance; No hair loss  Musculoskeletal: No joint pain or swelling  Psychiatric: No depression, anxiety, mood swings  Heme/Lymph: No easy bruising or bleeding gums    Vital Signs Last 24 Hrs  T(C): 35.7 (2019 21:17), Max: 37 (2019 15:16)  T(F): 96.3 (2019 21:17), Max: 98.6 (2019 15:16)  HR: 86 (2019 05:15) (65 - 86)  BP: 141/70 (2019 05:15) (130/94 - 141/70)  BP(mean): --  RR: 18 (2019 05:15) (16 - 18)  SpO2: 96% (2019 21:30) (95% - 98%)    Neurologic Examination:  General:  Appearance is consistent with chronologic age.  No abnormal facies.   General: The patient is oriented to person, place, time and date.  Recent and remote memory intact.  Fund of knowledge is intact and normal.  Language with normal repetition, comprehension and naming.  Nondysarthric.    Cranial nerves: intact VA, VFF.  EOMI w/o nystagmus, skew or reported double vision.  PERRL.  No ptosis/weakness of eyelid closure.  Facial sensation is normal with normal bite.  No facial asymmetry.  Hearing grossly intact b/l.  Palate elevates midline.  Tongue midline.  Motor examination:   Normal tone, bulk and range of motion.  No tenderness, twitching, tremors or involuntary movements.  Formal Muscle Strength Testing: (MRC grade R/L) 5/5 UE; 5/5 LE.  No observable drift.  Reflexes:   2+ b/l pectoralis, biceps, triceps, brachioradialis, patella and Achilles.  Plantar response downgoing b/l.  Jaw jerk, Karen, clonus absent.  Sensory examination:   Intact to light touch and pinprick, pain, temperature and proprioception and vibration in all extremities.  Cerebellum:   FTN/HKS intact with normal GINA in all limbs.  No dysmetria or dysdiadokinesia.  Gait narrow based and normal.    Labs:   CBC Full  -  ( 2019 15:35 )  WBC Count : 12.20 K/uL  RBC Count : 4.90 M/uL  Hemoglobin : 15.0 g/dL  Hematocrit : 43.2 %  Platelet Count - Automated : 149 K/uL  Mean Cell Volume : 88.2 fL  Mean Cell Hemoglobin : 30.6 pg  Mean Cell Hemoglobin Concentration : 34.7 g/dL  Auto Neutrophil # : 6.25 K/uL  Auto Lymphocyte # : 4.59 K/uL  Auto Monocyte # : 1.10 K/uL  Auto Eosinophil # : 0.14 K/uL  Auto Basophil # : 0.07 K/uL  Auto Neutrophil % : 51.3 %  Auto Lymphocyte % : 37.6 %  Auto Monocyte % : 9.0 %  Auto Eosinophil % : 1.1 %  Auto Basophil % : 0.6 %        135  |  101  |  14  ----------------------------<  117<H>  4.6   |  21  |  0.8    Ca    8.5      2019 15:35    TPro  7.6  /  Alb  3.5  /  TBili  0.9  /  DBili  x   /  AST  32  /  ALT  24  /  AlkPhos  54  06-08    LIVER FUNCTIONS - ( 2019 15:35 )  Alb: 3.5 g/dL / Pro: 7.6 g/dL / ALK PHOS: 54 U/L / ALT: 24 U/L / AST: 32 U/L / GGT: x             Urinalysis Basic - ( 2019 17:50 )    Color: Dark Yellow / Appearance: Clear / S.025 / pH: x  Gluc: x / Ketone: Trace  / Bili: Small / Urobili: 1.0 mg/dL   Blood: x / Protein: Trace mg/dL / Nitrite: Negative   Leuk Esterase: Negative / RBC: 1-2 /HPF / WBC 1-2 /HPF   Sq Epi: x / Non Sq Epi: Few /HPF / Bacteria: Few /HPF          Neuroimaging:  NCHCT: CT Head No Cont:   EXAM:  CT BRAIN            PROCEDURE DATE:  2019            INTERPRETATION:  Clinical History / Reason for exam: Altered mental   status.    Technique: Noncontrast head CT.  Contiguous unenhanced CT axial images of   the head from the base tothe vertex with coronal and sagittal   reformatted images.    Comparison: Noncontrast CT head 2012    Findings:    The ventricles, basal cisterns and sulcal pattern are prominent   consistent with parenchymal volume loss.    There are patchy hypodensities in the periventricular and subcortical   white matter without mass effect compatible with chronic microvascular   changes.    There are bilateral patchy hypodensities within the bilateral basal   ganglia consistent with chronic lacunar infarcts, mildly progressed since   the prior study.    There is no acute intracranial hemorrhage, extra-axial fluid collection   or midline shift.     Gray white matter differentiation is maintained.    There is calcific atherosclerotic disease at the skull base.    The calvarium is intact. The visualized paranasal sinuses and mastoid air   cells are unremarkable.        IMPRESSION:     1.  No evidence of acute intracranial hemorrhage, mass effect or   territorial infarct.    2.  Moderate chronic microvascular changes and chronic lacunar infarcts.              ERIC CAGLE M.D., RESIDENT RADIOLOGIST  This document has been electronically signed.  AGUSTIN KIM M.D., ATTENDING RADIOLOGIST  This document has been electronically signed. 2019  6:17PM             (19 @ 18:01)    CT Angiography/Perfusion:  MRI Brain NC:  MRA Head/Neck:  EEG:    Assessment:  This is a 84y Female with h/o     Plan:   -   19 @ 07:12 Neurology Consult    Patient is a 84y old  Female who presents with a chief complaint of unable to care for herself (2019 18:23)      HPI:  84 year old female with a past medical history of Dementia (8 years), Depression, JAVI, COPD, CHF, history of LBBB, recent Metabolic encephalopathy secondary to UTI treated with Cipro with improvement, presenting for change in her baseline mental status. Patient's daughter reports that she has been constantly sleeping for the past 2 weeks more than usual, with decreased strength to move from bed to chair.  Daughter spoke to PMD, patient was started on Ciprofloxacin for UTI. Daughter reports that patient had dysuria.  After the cipro, patient's urine's color and smell retuned to normal. Patient's daughter reports that she had some episodes of hallucinations and was not acting like herself described as "worsening dementia" and described as "not her normal dementia". Denies fever, chills, nausea, vomiting, diarrhea, decreased PO intake, chest pain, palpitations, shortness of breath, abdominal pain, lower extremity swelling/pain/tenderness. Daughter reports some difficulty going up and down the stairs. (2019 18:23)    Patient is examine at the bedside. She is aaox2, to herself and place. She is able to follow commands, able to name and repeat but does not remember things, could not name the president and current month or date. She is moving all four extremities slightly weakener on the right. Denies headache, dizziness or LOB.  As per daughter was found outside the house screaming at the neighbor very agitated. Lately she has been having visual hallucinations as well.  She was diagnosed with dementia 8 years ago and was on Zoloft since then.      PAST MEDICAL & SURGICAL HISTORY:  Obstructive sleep apnea  Presenile dementia with depression  History of left bundle branch block  Metabolic encephalopathy  Urinary tract infection  Dementia  Hyperlipidemia  Hypertension  COPD (chronic obstructive pulmonary disease)  Congestive heart failure  S/P removal of ovarian cyst  H/O lumpectomy  History of appendectomy      FAMILY HISTORY:  FHx: lung cancer: father      Social History: (-) x 3    Allergies    No Known Allergies    Intolerances        MEDICATIONS  (STANDING):  aspirin enteric coated 81 milliGRAM(s) Oral daily  carvedilol 6.25 milliGRAM(s) Oral daily  enoxaparin Injectable 40 milliGRAM(s) SubCutaneous daily  furosemide    Tablet 20 milliGRAM(s) Oral <User Schedule>  lisinopril 10 milliGRAM(s) Oral daily  melatonin 5 milliGRAM(s) Oral at bedtime  sertraline 50 milliGRAM(s) Oral daily  tiotropium 18 MICROgram(s) Capsule 1 Capsule(s) Inhalation daily    MEDICATIONS  (PRN):      Review of systems:    Constitutional: No fever, weight loss or fatigue    Eyes: No eye pain or discharge  ENMT:  No difficulty hearing; No sinus or throat pain  Neck: No pain or stiffness  Respiratory: No cough, wheezing, chills or hemoptysis  Cardiovascular: No chest pain, palpitations, shortness of breath, dyspnea on exertion  Gastrointestinal: No abdominal pain, nausea, vomiting or hematemesis; No diarrhea or constipation.   Genitourinary: No dysuria, frequency, hematuria or incontinence  Neurological: As per HPI  Skin: No rashes or lesions   Endocrine: No heat or cold intolerance; No hair loss  Musculoskeletal: No joint pain or swelling  Psychiatric: No depression, anxiety, mood swings  Heme/Lymph: No easy bruising or bleeding gums    Vital Signs Last 24 Hrs  T(C): 35.7 (2019 21:17), Max: 37 (2019 15:16)  T(F): 96.3 (2019 21:17), Max: 98.6 (2019 15:16)  HR: 86 (2019 05:15) (65 - 86)  BP: 141/70 (2019 05:15) (130/94 - 141/70)  BP(mean): --  RR: 18 (2019 05:15) (16 - 18)  SpO2: 96% (2019 21:30) (95% - 98%)    Neurologic Examination:  General:  Appearance is consistent with chronologic age.  No abnormal facies.   General: The patient is oriented to person, place, time and date.  Recent and remote memory impaired  Language with normal repetition, comprehension and naming.  Nondysarthric.    Cranial nerves: intact VA, VFF.  EOMI w/o nystagmus, skew or reported double vision.  PERRL.  No ptosis/weakness of eyelid closure.  Facial sensation is normal with normal bite.  No facial asymmetry.  Hearing grossly intact b/l.  Palate elevates midline.  Tongue midline.  Motor examination:   Normal tone, bulk and range of motion.  No tenderness, twitching, tremors or involuntary movements.  Formal Muscle Strength Testing: (MRC grade R/L) 5/5 UE; 5/5 LE.  mild drift on RUE  Sensory examination:   Intact to light touch in all four extr  Cerebellum:   FTN/HKS intact with normal GINA in all limbs.  No dysmetria    Labs:   CBC Full  -  ( 2019 15:35 )  WBC Count : 12.20 K/uL  RBC Count : 4.90 M/uL  Hemoglobin : 15.0 g/dL  Hematocrit : 43.2 %  Platelet Count - Automated : 149 K/uL  Mean Cell Volume : 88.2 fL  Mean Cell Hemoglobin : 30.6 pg  Mean Cell Hemoglobin Concentration : 34.7 g/dL  Auto Neutrophil # : 6.25 K/uL  Auto Lymphocyte # : 4.59 K/uL  Auto Monocyte # : 1.10 K/uL  Auto Eosinophil # : 0.14 K/uL  Auto Basophil # : 0.07 K/uL  Auto Neutrophil % : 51.3 %  Auto Lymphocyte % : 37.6 %  Auto Monocyte % : 9.0 %  Auto Eosinophil % : 1.1 %  Auto Basophil % : 0.6 %        135  |  101  |  14  ----------------------------<  117<H>  4.6   |  21  |  0.8    Ca    8.5      2019 15:35    TPro  7.6  /  Alb  3.5  /  TBili  0.9  /  DBili  x   /  AST  32  /  ALT  24  /  AlkPhos  54  06-08    LIVER FUNCTIONS - ( 2019 15:35 )  Alb: 3.5 g/dL / Pro: 7.6 g/dL / ALK PHOS: 54 U/L / ALT: 24 U/L / AST: 32 U/L / GGT: x             Urinalysis Basic - ( 2019 17:50 )    Color: Dark Yellow / Appearance: Clear / S.025 / pH: x  Gluc: x / Ketone: Trace  / Bili: Small / Urobili: 1.0 mg/dL   Blood: x / Protein: Trace mg/dL / Nitrite: Negative   Leuk Esterase: Negative / RBC: 1-2 /HPF / WBC 1-2 /HPF   Sq Epi: x / Non Sq Epi: Few /HPF / Bacteria: Few /HPF          Neuroimaging:  NCHCT: CT Head No Cont:   EXAM:  CT BRAIN            PROCEDURE DATE:  2019            INTERPRETATION:  Clinical History / Reason for exam: Altered mental   status.    Technique: Noncontrast head CT.  Contiguous unenhanced CT axial images of   the head from the base tothe vertex with coronal and sagittal   reformatted images.    Comparison: Noncontrast CT head 2012    Findings:    The ventricles, basal cisterns and sulcal pattern are prominent   consistent with parenchymal volume loss.    There are patchy hypodensities in the periventricular and subcortical   white matter without mass effect compatible with chronic microvascular   changes.    There are bilateral patchy hypodensities within the bilateral basal   ganglia consistent with chronic lacunar infarcts, mildly progressed since   the prior study.    There is no acute intracranial hemorrhage, extra-axial fluid collection   or midline shift.     Gray white matter differentiation is maintained.    There is calcific atherosclerotic disease at the skull base.    The calvarium is intact. The visualized paranasal sinuses and mastoid air   cells are unremarkable.        IMPRESSION:     1.  No evidence of acute intracranial hemorrhage, mass effect or   territorial infarct.    2.  Moderate chronic microvascular changes and chronic lacunar infarcts.              ERIC CAGLE M.D., RESIDENT RADIOLOGIST  This document has been electronically signed.  AGUSTIN KIM M.D., ATTENDING RADIOLOGIST  This document has been electronically signed. 2019  6:17PM             (19 @ 18:01)    CT Angiography/Perfusion:  MRI Brain NC:  MRA Head/Neck:  EEG:    Assessment:  This is a 84y Female with h/o     Plan:   -   19 @ 07:12 Neurology Consult    Patient is a 84y old  Female who presents with a chief complaint of unable to care for herself (2019 18:23)    HPI:  84 year old female with a past medical history of Dementia (8 years), Depression, JAVI, COPD, CHF, history of LBBB, recent Metabolic encephalopathy secondary to UTI treated with Cipro with improvement, presenting for change in her baseline mental status. Patient's daughter reports that she has been constantly sleeping for the past 2 weeks more than usual, with decreased strength to move from bed to chair.  Daughter spoke to PMD, patient was started on Ciprofloxacin for UTI. Daughter reports that patient had dysuria.  After the cipro, patient's urine's color and smell retuned to normal. Patient's daughter reports that she had some episodes of hallucinations and was not acting like herself described as "worsening dementia" and described as "not her normal dementia". Denies fever, chills, nausea, vomiting, diarrhea, decreased PO intake, chest pain, palpitations, shortness of breath, abdominal pain, lower extremity swelling/pain/tenderness. Daughter reports some difficulty going up and down the stairs. (2019 18:23)    Patient is examine at the bedside. She is aaox2, to herself and place. She is able to follow commands, able to name and repeat but does not remember things, could not name the president and current month or date. She is moving all four extremities slightly weakener on the right. Denies headache, dizziness or LOB.  As per daughter was found outside the house screaming at the neighbor very agitated. Lately she has been having visual hallucinations as well.  She was diagnosed with dementia 8 years ago and was on Zoloft since then.  Was last seen by Dr. Solano from neurology 8 years ago with diagnosis of dementia and has not had followup since then.  Has been progressively worse over the last few years now constantly sleeps during the daytime with agitation, confusion and paranoia at night.      PAST MEDICAL & SURGICAL HISTORY:  Obstructive sleep apnea  Presenile dementia with depression  History of left bundle branch block  Metabolic encephalopathy  Urinary tract infection  Dementia  Hyperlipidemia  Hypertension  COPD (chronic obstructive pulmonary disease)  Congestive heart failure  S/P removal of ovarian cyst  H/O lumpectomy  History of appendectomy    FAMILY HISTORY:  FHx: lung cancer: father    Social History: (-) x 3    Allergies    No Known Allergies    Intolerances    MEDICATIONS  (STANDING):  aspirin enteric coated 81 milliGRAM(s) Oral daily  carvedilol 6.25 milliGRAM(s) Oral daily  enoxaparin Injectable 40 milliGRAM(s) SubCutaneous daily  furosemide    Tablet 20 milliGRAM(s) Oral <User Schedule>  lisinopril 10 milliGRAM(s) Oral daily  melatonin 5 milliGRAM(s) Oral at bedtime  sertraline 50 milliGRAM(s) Oral daily  tiotropium 18 MICROgram(s) Capsule 1 Capsule(s) Inhalation daily    MEDICATIONS  (PRN):    Review of systems:    Constitutional: No fever, weight loss or fatigue    Eyes: No eye pain or discharge  ENMT:  No difficulty hearing; No sinus or throat pain  Neck: No pain or stiffness  Respiratory: No cough, wheezing, chills or hemoptysis  Cardiovascular: No chest pain, palpitations, shortness of breath, dyspnea on exertion  Gastrointestinal: No abdominal pain, nausea, vomiting or hematemesis; No diarrhea or constipation.   Genitourinary: No dysuria, frequency, hematuria or incontinence  Neurological: As per HPI  Skin: No rashes or lesions   Endocrine: No heat or cold intolerance; No hair loss  Musculoskeletal: No joint pain or swelling  Psychiatric: No depression, anxiety, mood swings  Heme/Lymph: No easy bruising or bleeding gums    Vital Signs Last 24 Hrs  T(C): 35.7 (2019 21:17), Max: 37 (2019 15:16)  T(F): 96.3 (2019 21:17), Max: 98.6 (2019 15:16)  HR: 86 (2019 05:15) (65 - 86)  BP: 141/70 (2019 05:15) (130/94 - 141/70)  BP(mean): --  RR: 18 (2019 05:15) (16 - 18)  SpO2: 96% (2019 21:30) (95% - 98%)    Neurologic Examination:  General:  Appearance is consistent with chronologic age.  No abnormal facies.   General: The patient is oriented to person, place, time and date.  Recent and remote memory impaired  Language with normal repetition, comprehension and naming.  Nondysarthric.    Cranial nerves: intact VA, VFF.  EOMI w/o nystagmus, skew or reported double vision.  PERRL.  No ptosis/weakness of eyelid closure.  Facial sensation is normal with normal bite.  No facial asymmetry.  Hearing grossly intact b/l.  Palate elevates midline.  Tongue midline.  Motor examination:   Normal tone, bulk and range of motion.  No tenderness, twitching, tremors or involuntary movements.  Formal Muscle Strength Testing: (MRC grade R/L) 5/5 UE; 5/5 LE.  mild drift on RUE  Sensory examination:   Intact to light touch in all four extr  Cerebellum:   FTN/HKS intact with normal GINA in all limbs.  No dysmetria    Labs:   CBC Full  -  ( 2019 15:35 )  WBC Count : 12.20 K/uL  RBC Count : 4.90 M/uL  Hemoglobin : 15.0 g/dL  Hematocrit : 43.2 %  Platelet Count - Automated : 149 K/uL  Mean Cell Volume : 88.2 fL  Mean Cell Hemoglobin : 30.6 pg  Mean Cell Hemoglobin Concentration : 34.7 g/dL  Auto Neutrophil # : 6.25 K/uL  Auto Lymphocyte # : 4.59 K/uL  Auto Monocyte # : 1.10 K/uL  Auto Eosinophil # : 0.14 K/uL  Auto Basophil # : 0.07 K/uL  Auto Neutrophil % : 51.3 %  Auto Lymphocyte % : 37.6 %  Auto Monocyte % : 9.0 %  Auto Eosinophil % : 1.1 %  Auto Basophil % : 0.6 %        135  |  101  |  14  ----------------------------<  117<H>  4.6   |  21  |  0.8    Ca    8.5      2019 15:35    TPro  7.6  /  Alb  3.5  /  TBili  0.9  /  DBili  x   /  AST  32  /  ALT  24  /  AlkPhos  54  06-08    LIVER FUNCTIONS - ( 2019 15:35 )  Alb: 3.5 g/dL / Pro: 7.6 g/dL / ALK PHOS: 54 U/L / ALT: 24 U/L / AST: 32 U/L / GGT: x             Urinalysis Basic - ( 2019 17:50 )    Color: Dark Yellow / Appearance: Clear / S.025 / pH: x  Gluc: x / Ketone: Trace  / Bili: Small / Urobili: 1.0 mg/dL   Blood: x / Protein: Trace mg/dL / Nitrite: Negative   Leuk Esterase: Negative / RBC: 1-2 /HPF / WBC 1-2 /HPF   Sq Epi: x / Non Sq Epi: Few /HPF / Bacteria: Few /HPF    Neuroimaging:  NCHCT: CT Head No Cont:   EXAM:  CT BRAIN          PROCEDURE DATE:  2019      INTERPRETATION:  Clinical History / Reason for exam: Altered mental   status.    Technique: Noncontrast head CT.  Contiguous unenhanced CT axial images of   the head from the base tothe vertex with coronal and sagittal   reformatted images.    Comparison: Noncontrast CT head 2012    Findings:    The ventricles, basal cisterns and sulcal pattern are prominent   consistent with parenchymal volume loss.    There are patchy hypodensities in the periventricular and subcortical   white matter without mass effect compatible with chronic microvascular   changes.    There are bilateral patchy hypodensities within the bilateral basal   ganglia consistent with chronic lacunar infarcts, mildly progressed since   the prior study.    There is no acute intracranial hemorrhage, extra-axial fluid collection   or midline shift.     Gray white matter differentiation is maintained.    There is calcific atherosclerotic disease at the skull base.    The calvarium is intact. The visualized paranasal sinuses and mastoid air   cells are unremarkable.        IMPRESSION:     1.  No evidence of acute intracranial hemorrhage, mass effect or   territorial infarct.    2.  Moderate chronic microvascular changes and chronic lacunar infarcts.    ERIC CAGLE M.D., RESIDENT RADIOLOGIST  This document has been electronically signed.  AGUSTIN KIM M.D., ATTENDING RADIOLOGIST  This document has been electronically signed. 2019  6:17PM         (19 @ 18:01)

## 2019-06-09 NOTE — PROGRESS NOTE ADULT - ASSESSMENT
84 year old female with a past medical history of Dementia (8 years), Depression, JAVI, COPD, CHF, history of LBBB, recent Metabolic encephalopathy secondary to UTI treated with Cipro with improvement, presenting for change in her baseline mental status.    Dementia with Depression (for the past 8 years); Unable to care for herself  - No source of infection; treated for UTI recently  - ED: received LR 500cc  - Clinically and hemodynamically stable at time of admission  - CTH negative  - PT/OT/PMR, Social work consult    Recent UTI; 5 days of Cipro complete with one stat dose of Cipro.    CHF, unknown if systolic or diastolic  - Stable. Follows with cardiologist.  - Lasix 20mg every other day (3 times a week)  - Weight daily, HOB elevated 30-45, fluid intake < 1.5L/day, low Na, aspiration precautions  - accurate I's and O's    COPD; patient has not been taking her Spiriva for 2 years due to insurance issues. Currently COPD is stable. Discharge with Pulmonary follow up Dr Spence or Dr Josse Sutherland    JAVI; patient has not been able to tolerate CPAP 2 years prior. Needs outpatient Pulmonary follow up for new masks and new recommendations that the patient might be compliant with.    HTN; lisinopril 10, carvedilol 6.25 once a day    HLD; was on simvastatin but had elevated LFTs. Proper diet.    Depression; Sertraline    Med rec confirmed with patient's daughter  DVT ppx; Lovenox  GI ppx; no indication  Activity; Ambulate as tolerated, fall precautions  Dispo; 24 hours

## 2019-06-10 ENCOUNTER — OUTPATIENT (OUTPATIENT)
Dept: OUTPATIENT SERVICES | Facility: HOSPITAL | Age: 84
LOS: 1 days | Discharge: HOME | End: 2019-06-10

## 2019-06-10 DIAGNOSIS — N39.0 URINARY TRACT INFECTION, SITE NOT SPECIFIED: ICD-10-CM

## 2019-06-10 DIAGNOSIS — Z02.9 ENCOUNTER FOR ADMINISTRATIVE EXAMINATIONS, UNSPECIFIED: ICD-10-CM

## 2019-06-10 DIAGNOSIS — Z98.890 OTHER SPECIFIED POSTPROCEDURAL STATES: Chronic | ICD-10-CM

## 2019-06-10 DIAGNOSIS — Z90.49 ACQUIRED ABSENCE OF OTHER SPECIFIED PARTS OF DIGESTIVE TRACT: Chronic | ICD-10-CM

## 2019-06-10 DIAGNOSIS — I10 ESSENTIAL (PRIMARY) HYPERTENSION: ICD-10-CM

## 2019-06-10 PROBLEM — F03.90 UNSPECIFIED DEMENTIA WITHOUT BEHAVIORAL DISTURBANCE: Chronic | Status: ACTIVE | Noted: 2019-06-08

## 2019-06-10 PROBLEM — G93.41 METABOLIC ENCEPHALOPATHY: Chronic | Status: ACTIVE | Noted: 2019-06-08

## 2019-06-10 PROBLEM — E78.5 HYPERLIPIDEMIA, UNSPECIFIED: Chronic | Status: ACTIVE | Noted: 2019-06-08

## 2019-06-10 PROBLEM — J44.9 CHRONIC OBSTRUCTIVE PULMONARY DISEASE, UNSPECIFIED: Chronic | Status: ACTIVE | Noted: 2019-06-08

## 2019-06-10 PROBLEM — Z86.79 PERSONAL HISTORY OF OTHER DISEASES OF THE CIRCULATORY SYSTEM: Chronic | Status: ACTIVE | Noted: 2019-06-08

## 2019-06-10 PROBLEM — G47.33 OBSTRUCTIVE SLEEP APNEA (ADULT) (PEDIATRIC): Chronic | Status: ACTIVE | Noted: 2019-06-08

## 2019-06-10 PROBLEM — I50.9 HEART FAILURE, UNSPECIFIED: Chronic | Status: ACTIVE | Noted: 2019-06-08

## 2019-06-10 LAB
TSH SERPL-MCNC: 2.47 UIU/ML — SIGNIFICANT CHANGE UP (ref 0.27–4.2)
VIT B12 SERPL-MCNC: 165 PG/ML — LOW (ref 232–1245)

## 2019-06-10 PROCEDURE — 99233 SBSQ HOSP IP/OBS HIGH 50: CPT

## 2019-06-10 PROCEDURE — 71046 X-RAY EXAM CHEST 2 VIEWS: CPT | Mod: 26

## 2019-06-10 RX ORDER — PREGABALIN 225 MG/1
1000 CAPSULE ORAL DAILY
Refills: 0 | Status: DISCONTINUED | OUTPATIENT
Start: 2019-06-10 | End: 2019-06-12

## 2019-06-10 RX ADMIN — SERTRALINE 50 MILLIGRAM(S): 25 TABLET, FILM COATED ORAL at 11:47

## 2019-06-10 RX ADMIN — TIOTROPIUM BROMIDE 1 CAPSULE(S): 18 CAPSULE ORAL; RESPIRATORY (INHALATION) at 08:15

## 2019-06-10 RX ADMIN — Medication 20 MILLIGRAM(S): at 05:55

## 2019-06-10 RX ADMIN — ENOXAPARIN SODIUM 40 MILLIGRAM(S): 100 INJECTION SUBCUTANEOUS at 11:48

## 2019-06-10 RX ADMIN — CARVEDILOL PHOSPHATE 6.25 MILLIGRAM(S): 80 CAPSULE, EXTENDED RELEASE ORAL at 05:55

## 2019-06-10 RX ADMIN — Medication 5 MILLIGRAM(S): at 21:36

## 2019-06-10 RX ADMIN — LISINOPRIL 10 MILLIGRAM(S): 2.5 TABLET ORAL at 05:55

## 2019-06-10 RX ADMIN — Medication 81 MILLIGRAM(S): at 11:47

## 2019-06-10 NOTE — OCCUPATIONAL THERAPY INITIAL EVALUATION ADULT - ADDITIONAL COMMENTS
Pt uses RW in PH, daughter states she is unable to manage walker in some areas of the house and uses furniture to move around the house. Daughter comes to give assistance with meals, dressing, household chores 4x per day.

## 2019-06-10 NOTE — CONSULT NOTE ADULT - ASSESSMENT

## 2019-06-10 NOTE — PHYSICAL THERAPY INITIAL EVALUATION ADULT - PERTINENT HX OF CURRENT PROBLEM, REHAB EVAL
Admitted for generalized weakness, decreased ambulation and stair negotiation skills, decreased endurance and motivation for daily activities.

## 2019-06-10 NOTE — PHYSICAL THERAPY INITIAL EVALUATION ADULT - GENERAL OBSERVATIONS, REHAB EVAL
Chart reviewed, pt encountered seated b/s chair, NAD, daughter present b/s. IE completed Chart reviewed, pt encountered seated b/s chair, NAD, daughter present b/s. IE completed. Daughter reports pt has been declining functionally over time, requiring significant assist and encouragement for BADL's, functional mobility

## 2019-06-10 NOTE — OCCUPATIONAL THERAPY INITIAL EVALUATION ADULT - GENERAL OBSERVATIONS, REHAB EVAL
1030-1100am; Pt received seated in b/s recliner, +IV lock, with daughter present in NAD. Pt agreeable to OT tx session.

## 2019-06-10 NOTE — PROGRESS NOTE ADULT - ASSESSMENT
#Progression of dementia   recall 1/5 after 5 min   per family it has been worsening over 9 years, initially attributed to depression ,  #Depression, on sertraline  #JAVI, stable  #COPD at chronic stable state,   #CHF, history of LBBB, - unclear etiology , suspect systolic op follow up  #recent Metabolic encephalopathy secondary to UTI sp abx  #CKD 2 stable     #Atelectasis deep breathing   Progress Note Handoff    Pending:  pt for disposition     Family discussion: discussed with daughter at bedside in agreement    Disposition: tbd awaiting pt

## 2019-06-10 NOTE — OCCUPATIONAL THERAPY INITIAL EVALUATION ADULT - PLANNED THERAPY INTERVENTIONS, OT EVAL
strengthening/ADL retraining/bed mobility training/balance training/parent/caregiver training.../transfer training

## 2019-06-10 NOTE — PROGRESS NOTE ADULT - ASSESSMENT
OPAL MCCARTY 84y Female  MRN#: [id number]   CODE STATUS:       SUBJECTIVE    Patient is a 84y old Female who presents with a chief complaint of unable to care for herself (10 Yosvany 2019 14:22).  Currently admitted to medicine with the primary diagnosis of progression of dementia.    Interval History: Today is hospital day 2d. Overnight there were no events. Today she is doing well; no new events.    HPI:   HPI:  84 year old female with a past medical history of Dementia (8 years), Depression, JAVI, COPD, CHF, history of LBBB, recent Metabolic encephalopathy secondary to UTI treated with Cipro with improvement, presenting for change in her baseline mental status. Patient's daughter reports that she has been constantly sleeping for the past 2 weeks more than usual, with decreased strength to move from bed to chair.  Daughter spoke to PMD, patient was started on Ciprofloxacin for UTI. Daughter reports that patient had dysuria.  After the cipro, patient's urine's color and smell retuned to normal. Patient's daughter reports that she had some episodes of hallucinations and was not acting like herself described as "worsening dementia" and described as "not her normal dementia". Denies fever, chills, nausea, vomiting, diarrhea, decreased PO intake, chest pain, palpitations, shortness of breath, abdominal pain, lower extremity swelling/pain/tenderness. Daughter reports some difficulty going up and down the stairs. (2019 18:23)    Hospital Course:       PAST MEDICAL & SURGICAL HISTORY  Obstructive sleep apnea  Presenile dementia with depression  History of left bundle branch block  Metabolic encephalopathy  Urinary tract infection  Dementia  Hyperlipidemia  Hypertension  COPD (chronic obstructive pulmonary disease)  Congestive heart failure  S/P removal of ovarian cyst  H/O lumpectomy  History of appendectomy      ALLERGIES:  No Known Allergies      MEDICATIONS:  STANDING MEDICATIONS  aspirin enteric coated 81 milliGRAM(s) Oral daily  carvedilol 6.25 milliGRAM(s) Oral daily  enoxaparin Injectable 40 milliGRAM(s) SubCutaneous daily  furosemide    Tablet 20 milliGRAM(s) Oral <User Schedule>  lisinopril 10 milliGRAM(s) Oral daily  melatonin 5 milliGRAM(s) Oral at bedtime  sertraline 50 milliGRAM(s) Oral daily  tiotropium 18 MICROgram(s) Capsule 1 Capsule(s) Inhalation daily    PRN MEDICATIONS          OBJECTIVE    VITAL SIGNS: Last 24 Hours  T(C): 36.3 (10 Yosvany 2019 12:05), Max: 36.6 (10 Yosvany 2019 05:58)  T(F): 97.4 (10 Yosvany 2019 12:05), Max: 97.8 (10 Yosvany 2019 05:58)  HR: 75 (10 Yosvany 2019 12:05) (74 - 76)  BP: 121/55 (10 Yosvany 2019 12:05) (118/77 - 130/66)  BP(mean): --  RR: 18 (10 Yosvany 2019 12:05) (18 - 18)  SpO2: --      PHYSICAL EXAM:    GENERAL: No acute distress. AOx2.  HEENT:  Atraumatic, Normocephalic. EOMI, PERRLA.  PULMONARY: Clear to auscultation bilaterally.  CARDIOVASCULAR: Regular rate and rhythm; No murmurs, rubs, or gallops.      LABS:                        14.9   9.76  )-----------( 125      ( 2019 05:30 )             43.5     06-09    135  |  99  |  14  ----------------------------<  153<H>  4.1   |  23  |  0.8    Ca    8.7      2019 05:30  Mg     1.8     0609    TPro  7.4  /  Alb  3.3<L>  /  TBili  0.7  /  DBili  x   /  AST  24  /  ALT  22  /  AlkPhos  62  06-09    PT/INR - ( 2019 05:30 )   PT: 14.40 sec;   INR: 1.25 ratio         PTT - ( 2019 05:30 )  PTT:34.8 sec  Urinalysis Basic - ( 2019 17:50 )    Color: Dark Yellow / Appearance: Clear / S.025 / pH: x  Gluc: x / Ketone: Trace  / Bili: Small / Urobili: 1.0 mg/dL   Blood: x / Protein: Trace mg/dL / Nitrite: Negative   Leuk Esterase: Negative / RBC: 1-2 /HPF / WBC 1-2 /HPF   Sq Epi: x / Non Sq Epi: Few /HPF / Bacteria: Few /HPF    Culture - Urine (collected 2019 17:50)  Source: .Urine Clean Catch (Midstream)  Preliminary Report (10 Yosvany 2019 08:02):    50,000 - 99,000 CFU/mL Gram Negative Rods    Culture - Blood (collected 2019 15:17)  Source: .Blood Blood  Preliminary Report (10 Yosvany 2019 01:01):    No growth to date.    Culture - Blood (collected 2019 15:17)  Source: .Blood Blood  Preliminary Report (10 Yosvany 2019 01:01):    No growth to date.      RADIOLOGY:        ASSESSMENT AND PLAN    84 year old female with a past medical history of Dementia (8 years), Depression, JAVI, COPD, CHF, history of LBBB, recent Metabolic encephalopathy secondary to UTI treated with Cipro with improvement, presenting for change in her baseline mental status.    Progression of dementia / Depression (for the past 8 years): Unable to care for herself  - No source of infection; treated for UTI recently and r/p UA negative  - CTH negative  - No electrolyte abnormalities  - F/u B12, folate, TSH, RPR  - PT/OT/PMR, Social work consult    Major depressive disorder  - Per psychiatry, continue with zoloft at current dose and f/u outpatient    Recent UTI  - 5 days of Cipro complete with one stat dose of Cipro.  - Resolved    CHF, unknown if systolic or diastolic  - Stable. Follows with cardiologist.  - Lasix 20 mg every other day (3 times a week)  - Strict I/O. Weight daily, HOB elevated 30-45, fluid intake < 1.5L/day, low Na, aspiration precautions    COPD  - Stable  - patient has not been taking her Spiriva for 2 years due to insurance issues  - Discharge with Pulmonary follow up Dr Spence or Dr Josse Sutherland    JAVI  - patient has not been able to tolerate CPAP 2 years prior. Needs outpatient Pulmonary follow up for new mask    HTN  - C/w lisinopril, coreg    HLD  - simvastatin held for elevated LFTs    Depression  - C/w Sertraline    DVT ppx: Lovenox  GI ppx: no indication      Activity: Ambulate as tolerated; fall precautions  Dispo: SNF

## 2019-06-10 NOTE — CONSULT NOTE ADULT - SUBJECTIVE AND OBJECTIVE BOX
HPI:  84 year old female with a past medical history of Dementia (8 years), Depression, JAVI, COPD, CHF, history of LBBB, recent Metabolic encephalopathy secondary to UTI treated with Cipro with improvement, presenting for change in her baseline mental status. Patient's daughter reports that she has been constantly sleeping for the past 2 weeks more than usual, with decreased strength to move from bed to chair.  Daughter spoke to PMD, patient was started on Ciprofloxacin for UTI. Daughter reports that patient had dysuria.  After the cipro, patient's urine's color and smell retuned to normal. Patient's daughter reports that she had some episodes of hallucinations and was not acting like herself described as "worsening dementia" and described as "not her normal dementia". Denies fever, chills, nausea, vomiting, diarrhea, decreased PO intake, chest pain, palpitations, shortness of breath, abdominal pain, lower extremity swelling/pain/tenderness. Daughter reports some difficulty going up and down the stairs. (2019 18:23)      PAST MEDICAL & SURGICAL HISTORY:  Obstructive sleep apnea  Presenile dementia with depression  History of left bundle branch block  Metabolic encephalopathy  Urinary tract infection  Dementia  Hyperlipidemia  Hypertension  COPD (chronic obstructive pulmonary disease)  Congestive heart failure  S/P removal of ovarian cyst  H/O lumpectomy  History of appendectomy      Hospital Course:    TODAY'S SUBJECTIVE & REVIEW OF SYMPTOMS:     Constitutional WNL   Cardio WNL   Resp WNL   GI WNL  Heme WNL  Endo WNL  Skin WNL  MSK Weakness  Neuro WNL  Cognitive WNL  Psych WNL      MEDICATIONS  (STANDING):  aspirin enteric coated 81 milliGRAM(s) Oral daily  carvedilol 6.25 milliGRAM(s) Oral daily  enoxaparin Injectable 40 milliGRAM(s) SubCutaneous daily  furosemide    Tablet 20 milliGRAM(s) Oral <User Schedule>  lisinopril 10 milliGRAM(s) Oral daily  melatonin 5 milliGRAM(s) Oral at bedtime  sertraline 50 milliGRAM(s) Oral daily  tiotropium 18 MICROgram(s) Capsule 1 Capsule(s) Inhalation daily    MEDICATIONS  (PRN):      FAMILY HISTORY:  FHx: lung cancer: father      Allergies    No Known Allergies    Intolerances        SOCIAL HISTORY:    [  ] Etoh  [  ] Smoking  [  ] Substance abuse     Home Environment:  [ x ] Home Alone  [  ] Lives with Family  [  ] Home Health Aid    Dwelling:  [  ] Apartment  [ x ] Private House  [  ] Adult Home  [  ] Skilled Nursing Facility      [  ] Short Term  [  ] Long Term  [x  ] Stairs       Elevator [  ]    FUNCTIONAL STATUS PTA: (Check all that apply)  Ambulation: [ x  ]Independent    [  ] Dependent     [  ] Non-Ambulatory  Assistive Device: [  ] SA Cane  [  ]  Q Cane  [x  ] Walker  [  ]  Wheelchair  ADL : [x  ] Independent  [  ]  Dependent       Vital Signs Last 24 Hrs  T(C): 36.3 (10 Yosvany 2019 12:05), Max: 36.6 (10 Yosvany 2019 05:58)  T(F): 97.4 (10 Yosvany 2019 12:05), Max: 97.8 (10 Yosvany 2019 05:58)  HR: 75 (10 Yosvany 2019 12:05) (74 - 76)  BP: 121/55 (10 Yosvany 2019 12:05) (118/77 - 130/66)  BP(mean): --  RR: 18 (10 Yosvany 2019 12:05) (18 - 18)  SpO2: --      PHYSICAL EXAM: Alert & Oriented X3  GENERAL: NAD, well-groomed, well-developed  HEAD:  Atraumatic, Normocephalic  CHEST/LUNG: Clear   HEART: S1S2+  ABDOMEN: Soft, Nontender  EXTREMITIES:  no calf tenderness    NERVOUS SYSTEM:  Cranial Nerves 2-12 intact [  ] Abnormal  [  ]  ROM: WFL all extremities [x  ]  Abnormal [  ]  Motor Strength: WFL all extremities  [  ]  Abnormal [ x ]4/5 all ext  Sensation: intact to light touch [x  ] Abnormal [  ]  Reflexes: Symmetric [  ]  Abnormal [  ]    FUNCTIONAL STATUS:  Bed Mobility: Independent [  ]  Supervision [  ]  Needs Assistance [ x ]  N/A [  ]  Transfers: Independent [  ]  Supervision [  ]  Needs Assistance [ x ]  N/A [  ]   Ambulation: Independent [  ]  Supervision [  ]  Needs Assistance [  ]  N/A [  ]  ADL: Independent [  ] Requires Assistance [  ] N/A [  ]      LABS:                        14.9   9.76  )-----------( 125      ( 2019 05:30 )             43.5     -    135  |  99  |  14  ----------------------------<  153<H>  4.1   |  23  |  0.8    Ca    8.7      2019 05:30  Mg     1.8     -09    TPro  7.4  /  Alb  3.3<L>  /  TBili  0.7  /  DBili  x   /  AST  24  /  ALT  22  /  AlkPhos  62  06-09    PT/INR - ( 2019 05:30 )   PT: 14.40 sec;   INR: 1.25 ratio         PTT - ( 2019 05:30 )  PTT:34.8 sec  Urinalysis Basic - ( 2019 17:50 )    Color: Dark Yellow / Appearance: Clear / S.025 / pH: x  Gluc: x / Ketone: Trace  / Bili: Small / Urobili: 1.0 mg/dL   Blood: x / Protein: Trace mg/dL / Nitrite: Negative   Leuk Esterase: Negative / RBC: 1-2 /HPF / WBC 1-2 /HPF   Sq Epi: x / Non Sq Epi: Few /HPF / Bacteria: Few /HPF        RADIOLOGY & ADDITIONAL STUDIES:    Assesment:

## 2019-06-10 NOTE — PHYSICAL THERAPY INITIAL EVALUATION ADULT - IMPAIRMENTS FOUND, PT EVAL
cognitive impairment/muscle strength/gait, locomotion, and balance/poor safety awareness/aerobic capacity/endurance/arousal, attention, and cognition

## 2019-06-11 LAB
-  AMIKACIN: SIGNIFICANT CHANGE UP
-  AMPICILLIN/SULBACTAM: SIGNIFICANT CHANGE UP
-  AMPICILLIN: SIGNIFICANT CHANGE UP
-  AZTREONAM: SIGNIFICANT CHANGE UP
-  CEFEPIME: SIGNIFICANT CHANGE UP
-  CEFOXITIN: SIGNIFICANT CHANGE UP
-  CEFTRIAXONE: SIGNIFICANT CHANGE UP
-  CIPROFLOXACIN: SIGNIFICANT CHANGE UP
-  ERTAPENEM: SIGNIFICANT CHANGE UP
-  GENTAMICIN: SIGNIFICANT CHANGE UP
-  IMIPENEM: SIGNIFICANT CHANGE UP
-  LEVOFLOXACIN: SIGNIFICANT CHANGE UP
-  MEROPENEM: SIGNIFICANT CHANGE UP
-  NITROFURANTOIN: SIGNIFICANT CHANGE UP
-  PIPERACILLIN/TAZOBACTAM: SIGNIFICANT CHANGE UP
-  TIGECYCLINE: SIGNIFICANT CHANGE UP
-  TOBRAMYCIN: SIGNIFICANT CHANGE UP
-  TRIMETHOPRIM/SULFAMETHOXAZOLE: SIGNIFICANT CHANGE UP
CULTURE RESULTS: SIGNIFICANT CHANGE UP
METHOD TYPE: SIGNIFICANT CHANGE UP
ORGANISM # SPEC MICROSCOPIC CNT: SIGNIFICANT CHANGE UP
ORGANISM # SPEC MICROSCOPIC CNT: SIGNIFICANT CHANGE UP
SPECIMEN SOURCE: SIGNIFICANT CHANGE UP

## 2019-06-11 PROCEDURE — 99232 SBSQ HOSP IP/OBS MODERATE 35: CPT

## 2019-06-11 RX ORDER — LANOLIN ALCOHOL/MO/W.PET/CERES
1 CREAM (GRAM) TOPICAL
Qty: 0 | Refills: 0 | DISCHARGE
Start: 2019-06-11

## 2019-06-11 RX ORDER — DONEPEZIL HYDROCHLORIDE 10 MG/1
10 TABLET, FILM COATED ORAL AT BEDTIME
Refills: 0 | Status: DISCONTINUED | OUTPATIENT
Start: 2019-06-11 | End: 2019-06-12

## 2019-06-11 RX ORDER — PREGABALIN 225 MG/1
1 CAPSULE ORAL
Qty: 0 | Refills: 0 | DISCHARGE
Start: 2019-06-11

## 2019-06-11 RX ORDER — CIPROFLOXACIN LACTATE 400MG/40ML
1 VIAL (ML) INTRAVENOUS
Qty: 0 | Refills: 0 | DISCHARGE

## 2019-06-11 RX ORDER — DONEPEZIL HYDROCHLORIDE 10 MG/1
1 TABLET, FILM COATED ORAL
Qty: 0 | Refills: 0 | DISCHARGE
Start: 2019-06-11

## 2019-06-11 RX ADMIN — PREGABALIN 1000 MICROGRAM(S): 225 CAPSULE ORAL at 12:27

## 2019-06-11 RX ADMIN — ENOXAPARIN SODIUM 40 MILLIGRAM(S): 100 INJECTION SUBCUTANEOUS at 12:10

## 2019-06-11 RX ADMIN — DONEPEZIL HYDROCHLORIDE 10 MILLIGRAM(S): 10 TABLET, FILM COATED ORAL at 21:23

## 2019-06-11 RX ADMIN — Medication 81 MILLIGRAM(S): at 12:10

## 2019-06-11 RX ADMIN — Medication 5 MILLIGRAM(S): at 21:22

## 2019-06-11 RX ADMIN — SERTRALINE 50 MILLIGRAM(S): 25 TABLET, FILM COATED ORAL at 12:10

## 2019-06-11 NOTE — PROGRESS NOTE ADULT - ASSESSMENT
OPAL MCCARTY 84y Female  MRN#: [id number]   CODE STATUS: Full code      SUBJECTIVE    Patient is a 84y old Female who presents with a chief complaint of unable to care for herself (10 Yosvany 2019 14:22).  Currently admitted to medicine with the primary diagnosis of progression of dementia.    Interval History: Today is hospital day 3d. Overnight there were no events. Today she is doing well; no new events. Wants to go to same SNF facility as her .    HPI:   HPI:  84 year old female with a past medical history of Dementia (8 years), Depression, JAVI, COPD, CHF, history of LBBB, recent Metabolic encephalopathy secondary to UTI treated with Cipro with improvement, presenting for change in her baseline mental status. Patient's daughter reports that she has been constantly sleeping for the past 2 weeks more than usual, with decreased strength to move from bed to chair.  Daughter spoke to PMD, patient was started on Ciprofloxacin for UTI. Daughter reports that patient had dysuria.  After the cipro, patient's urine's color and smell retuned to normal. Patient's daughter reports that she had some episodes of hallucinations and was not acting like herself described as "worsening dementia" and described as "not her normal dementia". Denies fever, chills, nausea, vomiting, diarrhea, decreased PO intake, chest pain, palpitations, shortness of breath, abdominal pain, lower extremity swelling/pain/tenderness. Daughter reports some difficulty going up and down the stairs. (08 Jun 2019 18:23)    Hospital Course:       PAST MEDICAL & SURGICAL HISTORY  Obstructive sleep apnea  Presenile dementia with depression  History of left bundle branch block  Metabolic encephalopathy  Urinary tract infection  Dementia  Hyperlipidemia  Hypertension  COPD (chronic obstructive pulmonary disease)  Congestive heart failure  S/P removal of ovarian cyst  H/O lumpectomy  History of appendectomy      ALLERGIES:  No Known Allergies      MEDICATIONS:  STANDING MEDICATIONS  aspirin enteric coated 81 milliGRAM(s) Oral daily  carvedilol 6.25 milliGRAM(s) Oral daily  cyanocobalamin 1000 MICROGram(s) Oral daily  donepezil 10 milliGRAM(s) Oral at bedtime  enoxaparin Injectable 40 milliGRAM(s) SubCutaneous daily  furosemide    Tablet 20 milliGRAM(s) Oral <User Schedule>  lisinopril 10 milliGRAM(s) Oral daily  melatonin 5 milliGRAM(s) Oral at bedtime  sertraline 50 milliGRAM(s) Oral daily  tiotropium 18 MICROgram(s) Capsule 1 Capsule(s) Inhalation daily    PRN MEDICATIONS        OBJECTIVE    VITAL SIGNS: Last 24 Hours  T(C): 35.8 (11 Jun 2019 14:45), Max: 36.6 (10 Yosvany 2019 21:38)  T(F): 96.4 (11 Jun 2019 14:45), Max: 97.8 (10 Yosvany 2019 21:38)  HR: 72 (11 Jun 2019 14:45) (72 - 78)  BP: 140/65 (11 Jun 2019 14:45) (100/55 - 177/87)  BP(mean): --  RR: 18 (11 Jun 2019 14:45) (18 - 18)  SpO2: --      PHYSICAL EXAM:    GENERAL: No acute distress. AOx2.  HEENT:  Atraumatic, Normocephalic. EOMI, PERRLA.  PULMONARY: Clear to auscultation bilaterally.  CARDIOVASCULAR: Regular rate and rhythm; No murmurs, rubs, or gallops.      LABS:    Culture - Urine (collected 08 Jun 2019 17:50)  Source: .Urine Clean Catch (Midstream)  Final Report (11 Jun 2019 08:54):    50,000 - 99,000 CFU/mL Escherichia coli  Organism: Escherichia coli (11 Jun 2019 08:54)  Organism: Escherichia coli (11 Jun 2019 08:54)      RADIOLOGY:        ASSESSMENT AND PLAN    84 year old female with a past medical history of Dementia (8 years), Depression, JAVI, COPD, CHF, history of LBBB, recent Metabolic encephalopathy secondary to UTI treated with Cipro with improvement, presenting for change in her baseline mental status.    Progression of dementia / Depression (for the past 8 years): Unable to care for herself  - No source of infection; treated for UTI recently and r/p UA negative  - CTH negative  - No electrolyte abnormalities  - B12 low as below. TSH and ammonia normal.  - PT/OT/PMR, Social work consult    B12 deficiency  - PO B12 supplements    Major depressive disorder  - Per psychiatry, continue with zoloft at current dose and f/u outpatient    Recent UTI  - 5 days of Cipro complete with one stat dose of Cipro inpatient.  - Resolved    CHF, unknown if systolic or diastolic  - Stable. Follows with cardiologist.  - Lasix 20 mg every other day (3 times a week)  - Strict I/O. Weight daily, HOB elevated 30-45, fluid intake < 1.5L/day, low Na, aspiration precautions    COPD  - Stable  - patient has not been taking her Spiriva for 2 years due to insurance issues  - Discharge with Pulmonary follow up Dr Spence or Dr Josse Sutherland    JAVI  - patient has not been able to tolerate CPAP 2 years prior. Needs outpatient Pulmonary follow up for new mask    HTN  - C/w lisinopril, coreg    HLD  - simvastatin held for elevated LFTs (trended back down)  - F/u outpatient for modification    Depression  - C/w Sertraline    DVT ppx: Lovenox  GI ppx: no indication      Activity: Ambulate as tolerated; fall precautions  Dispo: Medically cleared for SNF pending bed. OPAL MCCARTY 84y Female  MRN#: [id number]   CODE STATUS: Full code      SUBJECTIVE    Patient is a 84y old female who presents with a chief complaint of unable to care for herself (10 Yosvany 2019 14:22).  Currently admitted to medicine with the primary diagnosis of progression of dementia.    Interval History: Today is hospital day 3d. Overnight there were no events. Today she is doing well; no new events.    HPI:   HPI:  84 year old female with a past medical history of Dementia (8 years), Depression, JAVI, COPD, CHF, history of LBBB, recent Metabolic encephalopathy secondary to UTI treated with Cipro with improvement, presenting for change in her baseline mental status. Patient's daughter reports that she has been constantly sleeping for the past 2 weeks more than usual, with decreased strength to move from bed to chair.  Daughter spoke to PMD, patient was started on Ciprofloxacin for UTI. Daughter reports that patient had dysuria.  After the cipro, patient's urine's color and smell retuned to normal. Patient's daughter reports that she had some episodes of hallucinations and was not acting like herself described as "worsening dementia" and described as "not her normal dementia". Denies fever, chills, nausea, vomiting, diarrhea, decreased PO intake, chest pain, palpitations, shortness of breath, abdominal pain, lower extremity swelling/pain/tenderness. Daughter reports some difficulty going up and down the stairs. (08 Jun 2019 18:23)    Hospital Course:       PAST MEDICAL & SURGICAL HISTORY  Obstructive sleep apnea  Presenile dementia with depression  History of left bundle branch block  Metabolic encephalopathy  Urinary tract infection  Dementia  Hyperlipidemia  Hypertension  COPD (chronic obstructive pulmonary disease)  Congestive heart failure  S/P removal of ovarian cyst  H/O lumpectomy  History of appendectomy      ALLERGIES:  No Known Allergies      MEDICATIONS:  STANDING MEDICATIONS  aspirin enteric coated 81 milliGRAM(s) Oral daily  carvedilol 6.25 milliGRAM(s) Oral daily  cyanocobalamin 1000 MICROGram(s) Oral daily  donepezil 10 milliGRAM(s) Oral at bedtime  enoxaparin Injectable 40 milliGRAM(s) SubCutaneous daily  furosemide    Tablet 20 milliGRAM(s) Oral <User Schedule>  lisinopril 10 milliGRAM(s) Oral daily  melatonin 5 milliGRAM(s) Oral at bedtime  sertraline 50 milliGRAM(s) Oral daily  tiotropium 18 MICROgram(s) Capsule 1 Capsule(s) Inhalation daily    PRN MEDICATIONS        OBJECTIVE    VITAL SIGNS: Last 24 Hours  T(C): 35.8 (11 Jun 2019 14:45), Max: 36.6 (10 Yosvany 2019 21:38)  T(F): 96.4 (11 Jun 2019 14:45), Max: 97.8 (10 Yosvany 2019 21:38)  HR: 72 (11 Jun 2019 14:45) (72 - 78)  BP: 140/65 (11 Jun 2019 14:45) (100/55 - 177/87)  BP(mean): --  RR: 18 (11 Jun 2019 14:45) (18 - 18)  SpO2: --      PHYSICAL EXAM:    GENERAL: No acute distress. AOx2.  HEENT:  Atraumatic, Normocephalic. EOMI, PERRLA.  PULMONARY: Clear to auscultation bilaterally.  CARDIOVASCULAR: Regular rate and rhythm; No murmurs, rubs, or gallops.      LABS:    Culture - Urine (collected 08 Jun 2019 17:50)  Source: .Urine Clean Catch (Midstream)  Final Report (11 Jun 2019 08:54):    50,000 - 99,000 CFU/mL Escherichia coli  Organism: Escherichia coli (11 Jun 2019 08:54)  Organism: Escherichia coli (11 Jun 2019 08:54)      RADIOLOGY:        ASSESSMENT AND PLAN    84 year old female with a past medical history of Dementia (8 years), Depression, JAVI, COPD, CHF, history of LBBB, recent Metabolic encephalopathy secondary to UTI treated with Cipro with improvement, presenting for change in her baseline mental status.    Progression of dementia / Depression (for the past 8 years): Unable to care for herself  - No source of infection; treated for UTI recently and r/p UA negative  - CTH negative  - No electrolyte abnormalities  - B12 low as below. TSH and ammonia normal.  - PT/OT/PMR, Social work consult    B12 deficiency  - PO B12 supplements    Major depressive disorder  - Per psychiatry, continue with zoloft at current dose and f/u outpatient    Recent UTI  - 5 days of Cipro complete with one stat dose of Cipro inpatient.  - Resolved    CHF, unknown if systolic or diastolic  - Stable. Follows with cardiologist.  - Lasix 20 mg every other day (3 times a week)  - Strict I/O. Weight daily, HOB elevated 30-45, fluid intake < 1.5L/day, low Na, aspiration precautions    COPD  - Stable  - patient has not been taking her Spiriva for 2 years due to insurance issues  - Discharge with Pulmonary follow up Dr Spence or Dr Josse Sutherland    JAVI  - patient has not been able to tolerate CPAP 2 years prior. Needs outpatient Pulmonary follow up for new mask    HTN  - C/w lisinopril, coreg    HLD  - simvastatin held for elevated LFTs (trended back down)  - F/u outpatient for modification    Depression  - C/w Sertraline    DVT ppx: Lovenox  GI ppx: no indication      Activity: Ambulate as tolerated; fall precautions  Dispo: Medically cleared for SNF pending bed.

## 2019-06-11 NOTE — PROGRESS NOTE ADULT - ASSESSMENT
#Progression of dementia   on donepizil  #Vitamin b12 deficiency cw b12 supplementation  #Depression, on sertraline  #JAVI, stable  #COPD at chronic stable state,   #CHF, history of LBBB, - unclear etiology , suspect systolic op follow up  #recent Metabolic encephalopathy secondary to UTI sp abx  #CKD 2 stable     #Atelectasis deep breathing   Progress Note Handoff    Pending:  placement    Family discussion: discussed with daughter at bedside in agreement    Disposition:snf

## 2019-06-11 NOTE — PROGRESS NOTE ADULT - SUBJECTIVE AND OBJECTIVE BOX
OPAL MCCARTY  84y  [facility] [address] [place] [address] A      Patient is a 84y old  Female who presents with a chief complaint of unable to care for herself (10 Yosvany 2019 14:22)      INTERVAL HPI/OVERNIGHT EVENTS:  no acute events overnight       REVIEW OF SYSTEMS:  CONSTITUTIONAL: No fever, weight loss, or fatigue  EYES: No eye pain, visual disturbances, or discharge  ENMT:  No difficulty hearing, tinnitus, vertigo; No sinus or throat pain  NECK: No pain or stiffness  BREASTS: No pain, masses, or nipple discharge  RESPIRATORY: No cough, wheezing, chills or hemoptysis; No shortness of breath  CARDIOVASCULAR: No chest pain, palpitations, dizziness, or leg swelling  GASTROINTESTINAL: No abdominal or epigastric pain. No nausea, vomiting, or hematemesis; No diarrhea or constipation. No melena or hematochezia.  GENITOURINARY: No dysuria, frequency, hematuria, or incontinence  NEUROLOGICAL: +memory loss  SKIN: No itching, burning, rashes, or lesions   LYMPH NODES: No enlarged glands  ENDOCRINE: No heat or cold intolerance; No hair loss  MUSCULOSKELETAL: No joint pain or swelling; No muscle, back, or extremity pain  PSYCHIATRIC: No depression, anxiety, mood swings, or difficulty sleeping  HEME/LYMPH: No easy bruising, or bleeding gums  ALLERY AND IMMUNOLOGIC: No hives or eczema  FAMILY HISTORY:  FHx: lung cancer: father    T(C): 35.9 (06-11-19 @ 04:57), Max: 36.6 (06-10-19 @ 21:38)  HR: 78 (06-11-19 @ 04:57) (75 - 78)  BP: 177/87 (06-11-19 @ 04:57) (100/55 - 177/87)  RR: 18 (06-11-19 @ 04:57) (18 - 18)  SpO2: --  Wt(kg): --Vital Signs Last 24 Hrs  T(C): 35.9 (11 Jun 2019 04:57), Max: 36.6 (10 Yosvany 2019 21:38)  T(F): 96.7 (11 Jun 2019 04:57), Max: 97.8 (10 Yosvany 2019 21:38)  HR: 78 (11 Jun 2019 04:57) (75 - 78)  BP: 177/87 (11 Jun 2019 04:57) (100/55 - 177/87)  BP(mean): --  RR: 18 (11 Jun 2019 04:57) (18 - 18)  SpO2: --    PHYSICAL EXAM:  GENERAL: NAD, well-groomed, well-developed  HEAD:  Atraumatic, Normocephalic  EYES: EOMI, PERRLA, conjunctiva and sclera clear  ENMT: No tonsillar erythema, exudates, or enlargement; Moist mucous membranes,   NECK: Supple, No JVD, Normal thyroid  NERVOUS SYSTEM:  Alert & Oriented X2  PULM: Clear to auscultation bilaterally  CARDIAC: Regular rate and rhythm; No murmurs, rubs, or gallops  GI: Soft, Nontender, Nondistended; Bowel sounds present  EXTREMITIES:  2+ Peripheral Pulses, No clubbing, cyanosis, or edema  LYMPH: No lymphadenopathy noted  SKIN: No rashes or lesions        Culture - Urine (collected 08 Jun 2019 17:50)  Source: .Urine Clean Catch (Midstream)  Final Report (11 Jun 2019 08:54):    50,000 - 99,000 CFU/mL Escherichia coli  Organism: Escherichia coli (11 Jun 2019 08:54)  Organism: Escherichia coli (11 Jun 2019 08:54)    Culture - Blood (collected 08 Jun 2019 15:17)  Source: .Blood Blood  Preliminary Report (10 Yosvany 2019 01:01):    No growth to date.    Culture - Blood (collected 08 Jun 2019 15:17)  Source: .Blood Blood  Preliminary Report (10 Yosvany 2019 01:01):    No growth to date.      aspirin enteric coated 81 milliGRAM(s) Oral daily  carvedilol 6.25 milliGRAM(s) Oral daily  cyanocobalamin 1000 MICROGram(s) Oral daily  donepezil 10 milliGRAM(s) Oral at bedtime  enoxaparin Injectable 40 milliGRAM(s) SubCutaneous daily  furosemide    Tablet 20 milliGRAM(s) Oral <User Schedule>  lisinopril 10 milliGRAM(s) Oral daily  melatonin 5 milliGRAM(s) Oral at bedtime  sertraline 50 milliGRAM(s) Oral daily  tiotropium 18 MICROgram(s) Capsule 1 Capsule(s) Inhalation daily      HEALTH ISSUES - PROBLEM Dx:          Case Discussed with House Staff   Spectra [phone]
OPAL MCCARTY  84y  [facility] [place] [address] [address]      Patient is a 84y old  Female who presents with a chief complaint of unable to care for herself (09 Jun 2019 08:41)      INTERVAL HPI/OVERNIGHT EVENTS:    no acute events overnight     REVIEW OF SYSTEMS:  CONSTITUTIONAL: No fever, weight loss, or fatigue  EYES: No eye pain, visual disturbances, or discharge  ENMT:  No difficulty hearing, tinnitus, vertigo; No sinus or throat pain  NECK: No pain or stiffness  BREASTS: No pain, masses, or nipple discharge  RESPIRATORY: No cough, wheezing, chills or hemoptysis; No shortness of breath  CARDIOVASCULAR: No chest pain, palpitations, dizziness, or leg swelling  GASTROINTESTINAL: No abdominal or epigastric pain. No nausea, vomiting, or hematemesis; No diarrhea or constipation. No melena or hematochezia.  GENITOURINARY: No dysuria, frequency, hematuria, or incontinence  NEUROLOGICAL:+Memory difficulty  SKIN: No itching, burning, rashes, or lesions   LYMPH NODES: No enlarged glands  ENDOCRINE: No heat or cold intolerance; No hair loss  MUSCULOSKELETAL: No joint pain or swelling; No muscle, back, or extremity pain  PSYCHIATRIC: No depression, anxiety, mood swings, or difficulty sleeping  HEME/LYMPH: No easy bruising, or bleeding gums  ALLERY AND IMMUNOLOGIC: No hives or eczema  FAMILY HISTORY:  FHx: lung cancer: father    T(C): 36.6 (06-10-19 @ 05:58), Max: 36.6 (06-10-19 @ 05:58)  HR: 76 (06-10-19 @ 05:58) (74 - 77)  BP: 130/66 (06-10-19 @ 05:58) (108/64 - 130/66)  RR: 18 (06-10-19 @ 05:58) (18 - 18)  SpO2: --  Wt(kg): --Vital Signs Last 24 Hrs  T(C): 36.6 (10 Yosvany 2019 05:58), Max: 36.6 (10 Yosvany 2019 05:58)  T(F): 97.8 (10 Yosvany 2019 05:58), Max: 97.8 (10 Yosvany 2019 05:58)  HR: 76 (10 Yosvany 2019 05:58) (74 - 77)  BP: 130/66 (10 Yosvany 2019 05:58) (108/64 - 130/66)  BP(mean): --  RR: 18 (10 Yosvany 2019 05:58) (18 - 18)  SpO2: --    PHYSICAL EXAM:  GENERAL: NAD, well-groomed, well-developed  HEAD:  Atraumatic, Normocephalic  EYES: EOMI, PERRLA, conjunctiva and sclera clear  ENMT: No tonsillar erythema, exudates, or enlargement; Moist mucous membranes, Good dentition, No lesions  NECK: Supple, No JVD, Normal thyroid  NERVOUS SYSTEM:  Alert & Oriented X1, recall is 1/3 after 5 min  PULM: Clear to auscultation bilaterally  CARDIAC: Regular rate and rhythm;  GI: Soft, Nontender, Nondistended; Bowel sounds present  EXTREMITIES:  2+ Peripheral Pulses, No clubbing, cyanosis, or edema  LYMPH: No lymphadenopathy noted  SKIN: No rashes or lesions    Consultant(s) Notes Reviewed:  [x ] YES  [ ] NO  Care Discussed with Consultants/Other Providers [ x] YES  [ ] NO    LABS:                            14.9   9.76  )-----------( 125      ( 09 Jun 2019 05:30 )             43.5   06-09    135  |  99  |  14  ----------------------------<  153<H>  4.1   |  23  |  0.8    Ca    8.7      09 Jun 2019 05:30  Mg     1.8     06-09    TPro  7.4  /  Alb  3.3<L>  /  TBili  0.7  /  DBili  x   /  AST  24  /  ALT  22  /  AlkPhos  62  06-09            Culture - Urine (collected 08 Jun 2019 17:50)  Source: .Urine Clean Catch (Midstream)  Preliminary Report (10 Yosvany 2019 08:02):    50,000 - 99,000 CFU/mL Gram Negative Rods    Culture - Blood (collected 08 Jun 2019 15:17)  Source: .Blood Blood  Preliminary Report (10 Yosvany 2019 01:01):    No growth to date.    Culture - Blood (collected 08 Jun 2019 15:17)  Source: .Blood Blood  Preliminary Report (10 Yosvany 2019 01:01):    No growth to date.      aspirin enteric coated 81 milliGRAM(s) Oral daily  carvedilol 6.25 milliGRAM(s) Oral daily  enoxaparin Injectable 40 milliGRAM(s) SubCutaneous daily  furosemide    Tablet 20 milliGRAM(s) Oral <User Schedule>  lisinopril 10 milliGRAM(s) Oral daily  melatonin 5 milliGRAM(s) Oral at bedtime  sertraline 50 milliGRAM(s) Oral daily  tiotropium 18 MICROgram(s) Capsule 1 Capsule(s) Inhalation daily      HEALTH ISSUES - PROBLEM Dx:          Case Discussed with House Staff   Spectra [phone]
SUBJECTIVE:    Patient is a 84y old Female who presents with a chief complaint of unable to care for herself (2019 07:11)    Currently admitted to medicine with the primary diagnosis of Inability to perform activities of daily living     Today is hospital day 1d. This morning she is resting comfortably in bed and reports no new issues or overnight events.     PAST MEDICAL & SURGICAL HISTORY  Obstructive sleep apnea  Presenile dementia with depression  History of left bundle branch block  Metabolic encephalopathy  Urinary tract infection  Dementia  Hyperlipidemia  Hypertension  COPD (chronic obstructive pulmonary disease)  Congestive heart failure  S/P removal of ovarian cyst  H/O lumpectomy  History of appendectomy    SOCIAL HISTORY:  Negative for smoking/alcohol/drug use.     ALLERGIES:  No Known Allergies    MEDICATIONS:  STANDING MEDICATIONS  aspirin enteric coated 81 milliGRAM(s) Oral daily  carvedilol 6.25 milliGRAM(s) Oral daily  enoxaparin Injectable 40 milliGRAM(s) SubCutaneous daily  furosemide    Tablet 20 milliGRAM(s) Oral <User Schedule>  lisinopril 10 milliGRAM(s) Oral daily  melatonin 5 milliGRAM(s) Oral at bedtime  sertraline 50 milliGRAM(s) Oral daily  tiotropium 18 MICROgram(s) Capsule 1 Capsule(s) Inhalation daily    PRN MEDICATIONS    VITALS:   T(F): 96.3  HR: 86  BP: 141/70  RR: 18  SpO2: 96%    PHYSICAL EXAM:  GEN: No acute distress  LUNGS: Clear to auscultation bilaterally   HEART: S1/S2 present. RRR.   ABD: Soft, non-tender, non-distended. Bowel sounds present  EXT: NC/NC/NE/2+PP/ANGEL  NEURO: AAOX3      LABS:                        14.9   9.76  )-----------( 125      ( 2019 05:30 )             43.5         135  |  99  |  14  ----------------------------<  153<H>  4.1   |  23  |  0.8    Ca    8.7      2019 05:30  Mg     1.8         TPro  7.4  /  Alb  3.3<L>  /  TBili  0.7  /  DBili  x   /  AST  24  /  ALT  22  /  AlkPhos  62      PT/INR - ( 2019 05:30 )   PT: 14.40 sec;   INR: 1.25 ratio         PTT - ( 2019 05:30 )  PTT:34.8 sec  Urinalysis Basic - ( 2019 17:50 )    Color: Dark Yellow / Appearance: Clear / S.025 / pH: x  Gluc: x / Ketone: Trace  / Bili: Small / Urobili: 1.0 mg/dL   Blood: x / Protein: Trace mg/dL / Nitrite: Negative   Leuk Esterase: Negative / RBC: 1-2 /HPF / WBC 1-2 /HPF   Sq Epi: x / Non Sq Epi: Few /HPF / Bacteria: Few /HPF          Troponin T, Serum: <0.01 ng/mL (19 @ 15:35)      CARDIAC MARKERS ( 2019 15:35 )  x     / <0.01 ng/mL / x     / x     / x            RADIOLOGY:

## 2019-06-12 ENCOUNTER — TRANSCRIPTION ENCOUNTER (OUTPATIENT)
Age: 84
End: 2019-06-12

## 2019-06-12 VITALS
DIASTOLIC BLOOD PRESSURE: 61 MMHG | TEMPERATURE: 96 F | RESPIRATION RATE: 18 BRPM | HEART RATE: 69 BPM | SYSTOLIC BLOOD PRESSURE: 110 MMHG

## 2019-06-12 LAB
ANION GAP SERPL CALC-SCNC: 15 MMOL/L — HIGH (ref 7–14)
BUN SERPL-MCNC: 12 MG/DL — SIGNIFICANT CHANGE UP (ref 10–20)
CALCIUM SERPL-MCNC: 8.6 MG/DL — SIGNIFICANT CHANGE UP (ref 8.5–10.1)
CHLORIDE SERPL-SCNC: 100 MMOL/L — SIGNIFICANT CHANGE UP (ref 98–110)
CO2 SERPL-SCNC: 22 MMOL/L — SIGNIFICANT CHANGE UP (ref 17–32)
CREAT SERPL-MCNC: 0.7 MG/DL — SIGNIFICANT CHANGE UP (ref 0.7–1.5)
GLUCOSE SERPL-MCNC: 103 MG/DL — HIGH (ref 70–99)
HCT VFR BLD CALC: 43.3 % — SIGNIFICANT CHANGE UP (ref 37–47)
HGB BLD-MCNC: 14.7 G/DL — SIGNIFICANT CHANGE UP (ref 12–16)
MCHC RBC-ENTMCNC: 30.3 PG — SIGNIFICANT CHANGE UP (ref 27–31)
MCHC RBC-ENTMCNC: 33.9 G/DL — SIGNIFICANT CHANGE UP (ref 32–37)
MCV RBC AUTO: 89.3 FL — SIGNIFICANT CHANGE UP (ref 81–99)
NRBC # BLD: 0 /100 WBCS — SIGNIFICANT CHANGE UP (ref 0–0)
PLATELET # BLD AUTO: 112 K/UL — LOW (ref 130–400)
POTASSIUM SERPL-MCNC: 4.2 MMOL/L — SIGNIFICANT CHANGE UP (ref 3.5–5)
POTASSIUM SERPL-SCNC: 4.2 MMOL/L — SIGNIFICANT CHANGE UP (ref 3.5–5)
RBC # BLD: 4.85 M/UL — SIGNIFICANT CHANGE UP (ref 4.2–5.4)
RBC # FLD: 15.1 % — HIGH (ref 11.5–14.5)
SODIUM SERPL-SCNC: 137 MMOL/L — SIGNIFICANT CHANGE UP (ref 135–146)
WBC # BLD: 9.14 K/UL — SIGNIFICANT CHANGE UP (ref 4.8–10.8)
WBC # FLD AUTO: 9.14 K/UL — SIGNIFICANT CHANGE UP (ref 4.8–10.8)

## 2019-06-12 PROCEDURE — 99238 HOSP IP/OBS DSCHRG MGMT 30/<: CPT

## 2019-06-12 RX ADMIN — ENOXAPARIN SODIUM 40 MILLIGRAM(S): 100 INJECTION SUBCUTANEOUS at 11:41

## 2019-06-12 RX ADMIN — Medication 81 MILLIGRAM(S): at 11:41

## 2019-06-12 RX ADMIN — PREGABALIN 1000 MICROGRAM(S): 225 CAPSULE ORAL at 11:41

## 2019-06-12 RX ADMIN — Medication 20 MILLIGRAM(S): at 05:55

## 2019-06-12 RX ADMIN — LISINOPRIL 10 MILLIGRAM(S): 2.5 TABLET ORAL at 05:55

## 2019-06-12 RX ADMIN — CARVEDILOL PHOSPHATE 6.25 MILLIGRAM(S): 80 CAPSULE, EXTENDED RELEASE ORAL at 05:55

## 2019-06-12 RX ADMIN — SERTRALINE 50 MILLIGRAM(S): 25 TABLET, FILM COATED ORAL at 11:41

## 2019-06-12 NOTE — DISCHARGE NOTE PROVIDER - PROVIDER TOKENS
PROVIDER:[TOKEN:[[id number]],FOLLOWUP:[2 weeks]] PROVIDER:[TOKEN:[[id number]],FOLLOWUP:[2 weeks]],PROVIDER:[TOKEN:[[id number]],FOLLOWUP:[1 month]],PROVIDER:[TOKEN:[[id number]],FOLLOWUP:[1 month]]

## 2019-06-12 NOTE — DISCHARGE NOTE PROVIDER - HOSPITAL COURSE
84 year old female with a past medical history of Dementia (8 years), Depression, JAVI, COPD, CHF, history of LBBB, recent Metabolic encephalopathy secondary to UTI treated with Cipro with improvement, presenting for change in her baseline mental status. Patient's daughter reports that she has been constantly sleeping for the past 2 weeks more than usual, with decreased strength to move from bed to chair.  Daughter spoke to PMD, patient was started on Ciprofloxacin for UTI. Daughter reports that patient had dysuria.  After the cipro, patient's urine's color and smell retuned to normal. Patient's daughter reports that she had some episodes of hallucinations and was not acting like herself described as "worsening dementia" and described as "not her normal dementia". Denies fever, chills, nausea, vomiting, diarrhea, decreased PO intake, chest pain, palpitations, shortness of breath, abdominal pain, lower extremity swelling/pain/tenderness. Daughter reports some difficulty going up and down the stairs. (08 Jun 2019 18:23) B12 found to be low and started on supplements. To be discharged to SNF. 84 year old female with a past medical history of Dementia (8 years), Depression, JAVI, COPD, CHF, history of LBBB, presenting for change in her baseline mental status. Patient's daughter reports that she has been constantly sleeping for the past 2 weeks more than usual, with decreased strength to move from bed to chair.  Daughter spoke to PMD and patient was started on Ciprofloxacin for UTI. Daughter reports that patient had dysuria and foul-smelling urine at that time. After the course of cipro, patient's urine's color and smell retuned to normal. Patient's mentation also improved but per daughter still difficult to manage at home with more frequent wandering and forgetfulness, so came to ED. B12 found to be low and started on supplements. Likely progression of dementia with possible superimposed B12 deficiency (although no other signs and symptoms). To be discharged to SNF.

## 2019-06-12 NOTE — DISCHARGE NOTE NURSING/CASE MANAGEMENT/SOCIAL WORK - NSDCDPATPORTLINK_GEN_ALL_CORE
You can access the [facility] Patient Portal, offered by [facility], by registering with the following website: [email or web address]

## 2019-06-12 NOTE — DISCHARGE NOTE PROVIDER - CARE PROVIDER_API CALL
Taras Hoffman)  Internal Medicine  [address] [place] [address]  Phone: [phone]  Fax: [phone]  Follow Up Time: 2 weeks Taras Hoffman)  Internal Medicine  [facility] [place] [address]  Phone: [phone]  Fax: [phone]  Follow Up Time: 2 weeks    Everett Alcazar)  Neurology  [address] [place] [address], [address]  [place] [address]  Phone: [phone]  Fax: [phone]  Follow Up Time: 1 month    Agustina Cosby (DO)  Psych  Physicians  [address] [place] [address]  Phone: [phone]  Fax: [phone]  Follow Up Time: 1 month

## 2019-06-12 NOTE — DISCHARGE NOTE PROVIDER - NSDCCPCAREPLAN_GEN_ALL_CORE_FT
PRINCIPAL DISCHARGE DIAGNOSIS  Diagnosis: Dementia  Assessment and Plan of Treatment: Take all medications as prescribed, including your new vitamin B12 supplement. Follow up with a neurologist outpatient.      SECONDARY DISCHARGE DIAGNOSES  Diagnosis: Depression  Assessment and Plan of Treatment: Take all medications as prescribed and follow up with your psychiatrist outpatient.

## 2019-06-12 NOTE — DISCHARGE NOTE PROVIDER - CARE PROVIDERS DIRECT ADDRESSES
,DirectAddress_Unknown ,DirectAddress_Unknown,[email or web address],DirectAddress_Unknown

## 2019-06-14 LAB
CULTURE RESULTS: SIGNIFICANT CHANGE UP
CULTURE RESULTS: SIGNIFICANT CHANGE UP
SPECIMEN SOURCE: SIGNIFICANT CHANGE UP
SPECIMEN SOURCE: SIGNIFICANT CHANGE UP

## 2019-06-17 DIAGNOSIS — F03.90 UNSPECIFIED DEMENTIA WITHOUT BEHAVIORAL DISTURBANCE: ICD-10-CM

## 2019-06-17 DIAGNOSIS — I44.7 LEFT BUNDLE-BRANCH BLOCK, UNSPECIFIED: ICD-10-CM

## 2019-06-17 DIAGNOSIS — J44.9 CHRONIC OBSTRUCTIVE PULMONARY DISEASE, UNSPECIFIED: ICD-10-CM

## 2019-06-17 DIAGNOSIS — I11.0 HYPERTENSIVE HEART DISEASE WITH HEART FAILURE: ICD-10-CM

## 2019-06-17 DIAGNOSIS — F03.91 UNSPECIFIED DEMENTIA WITH BEHAVIORAL DISTURBANCE: ICD-10-CM

## 2019-06-17 DIAGNOSIS — I50.22 CHRONIC SYSTOLIC (CONGESTIVE) HEART FAILURE: ICD-10-CM

## 2019-06-17 DIAGNOSIS — R26.9 UNSPECIFIED ABNORMALITIES OF GAIT AND MOBILITY: ICD-10-CM

## 2019-06-17 DIAGNOSIS — E78.5 HYPERLIPIDEMIA, UNSPECIFIED: ICD-10-CM

## 2019-06-17 DIAGNOSIS — Z71.89 OTHER SPECIFIED COUNSELING: ICD-10-CM

## 2019-06-17 DIAGNOSIS — G47.33 OBSTRUCTIVE SLEEP APNEA (ADULT) (PEDIATRIC): ICD-10-CM

## 2019-06-17 DIAGNOSIS — E53.8 DEFICIENCY OF OTHER SPECIFIED B GROUP VITAMINS: ICD-10-CM

## 2019-06-17 DIAGNOSIS — F32.9 MAJOR DEPRESSIVE DISORDER, SINGLE EPISODE, UNSPECIFIED: ICD-10-CM

## 2019-06-17 DIAGNOSIS — N39.0 URINARY TRACT INFECTION, SITE NOT SPECIFIED: ICD-10-CM

## 2019-06-25 ENCOUNTER — INPATIENT (INPATIENT)
Facility: HOSPITAL | Age: 84
LOS: 2 days | Discharge: NURSING, REHAB OR LONG-TERM CARE | End: 2019-06-28
Attending: INTERNAL MEDICINE | Admitting: INTERNAL MEDICINE
Payer: MEDICARE

## 2019-06-25 VITALS
TEMPERATURE: 98 F | HEART RATE: 70 BPM | OXYGEN SATURATION: 96 % | RESPIRATION RATE: 18 BRPM | SYSTOLIC BLOOD PRESSURE: 147 MMHG | DIASTOLIC BLOOD PRESSURE: 66 MMHG

## 2019-06-25 DIAGNOSIS — Z98.890 OTHER SPECIFIED POSTPROCEDURAL STATES: Chronic | ICD-10-CM

## 2019-06-25 DIAGNOSIS — Z90.49 ACQUIRED ABSENCE OF OTHER SPECIFIED PARTS OF DIGESTIVE TRACT: Chronic | ICD-10-CM

## 2019-06-25 LAB
ALBUMIN SERPL ELPH-MCNC: 3.5 G/DL — SIGNIFICANT CHANGE UP (ref 3.5–5.2)
ALP SERPL-CCNC: 56 U/L — SIGNIFICANT CHANGE UP (ref 30–115)
ALT FLD-CCNC: 27 U/L — SIGNIFICANT CHANGE UP (ref 0–41)
ANION GAP SERPL CALC-SCNC: 13 MMOL/L — SIGNIFICANT CHANGE UP (ref 7–14)
AST SERPL-CCNC: 31 U/L — SIGNIFICANT CHANGE UP (ref 0–41)
BASOPHILS # BLD AUTO: 0.07 K/UL — SIGNIFICANT CHANGE UP (ref 0–0.2)
BASOPHILS NFR BLD AUTO: 0.7 % — SIGNIFICANT CHANGE UP (ref 0–1)
BILIRUB SERPL-MCNC: 0.9 MG/DL — SIGNIFICANT CHANGE UP (ref 0.2–1.2)
BUN SERPL-MCNC: 10 MG/DL — SIGNIFICANT CHANGE UP (ref 10–20)
CALCIUM SERPL-MCNC: 8.6 MG/DL — SIGNIFICANT CHANGE UP (ref 8.5–10.1)
CHLORIDE SERPL-SCNC: 99 MMOL/L — SIGNIFICANT CHANGE UP (ref 98–110)
CO2 SERPL-SCNC: 25 MMOL/L — SIGNIFICANT CHANGE UP (ref 17–32)
CREAT SERPL-MCNC: 0.7 MG/DL — SIGNIFICANT CHANGE UP (ref 0.7–1.5)
EOSINOPHIL # BLD AUTO: 0.16 K/UL — SIGNIFICANT CHANGE UP (ref 0–0.7)
EOSINOPHIL NFR BLD AUTO: 1.7 % — SIGNIFICANT CHANGE UP (ref 0–8)
GLUCOSE SERPL-MCNC: 104 MG/DL — HIGH (ref 70–99)
HCT VFR BLD CALC: 41.5 % — SIGNIFICANT CHANGE UP (ref 37–47)
HGB BLD-MCNC: 14.5 G/DL — SIGNIFICANT CHANGE UP (ref 12–16)
IMM GRANULOCYTES NFR BLD AUTO: 0.3 % — SIGNIFICANT CHANGE UP (ref 0.1–0.3)
LACTATE SERPL-SCNC: 1.1 MMOL/L — SIGNIFICANT CHANGE UP (ref 0.5–2.2)
LYMPHOCYTES # BLD AUTO: 3.77 K/UL — HIGH (ref 1.2–3.4)
LYMPHOCYTES # BLD AUTO: 39 % — SIGNIFICANT CHANGE UP (ref 20.5–51.1)
MCHC RBC-ENTMCNC: 30.6 PG — SIGNIFICANT CHANGE UP (ref 27–31)
MCHC RBC-ENTMCNC: 34.9 G/DL — SIGNIFICANT CHANGE UP (ref 32–37)
MCV RBC AUTO: 87.6 FL — SIGNIFICANT CHANGE UP (ref 81–99)
MONOCYTES # BLD AUTO: 0.78 K/UL — HIGH (ref 0.1–0.6)
MONOCYTES NFR BLD AUTO: 8.1 % — SIGNIFICANT CHANGE UP (ref 1.7–9.3)
NEUTROPHILS # BLD AUTO: 4.85 K/UL — SIGNIFICANT CHANGE UP (ref 1.4–6.5)
NEUTROPHILS NFR BLD AUTO: 50.2 % — SIGNIFICANT CHANGE UP (ref 42.2–75.2)
NRBC # BLD: 0 /100 WBCS — SIGNIFICANT CHANGE UP (ref 0–0)
NT-PROBNP SERPL-SCNC: 2602 PG/ML — HIGH (ref 0–300)
PLATELET # BLD AUTO: 140 K/UL — SIGNIFICANT CHANGE UP (ref 130–400)
POTASSIUM SERPL-MCNC: 3.6 MMOL/L — SIGNIFICANT CHANGE UP (ref 3.5–5)
POTASSIUM SERPL-SCNC: 3.6 MMOL/L — SIGNIFICANT CHANGE UP (ref 3.5–5)
PROT SERPL-MCNC: 7.7 G/DL — SIGNIFICANT CHANGE UP (ref 6–8)
RBC # BLD: 4.74 M/UL — SIGNIFICANT CHANGE UP (ref 4.2–5.4)
RBC # FLD: 13.9 % — SIGNIFICANT CHANGE UP (ref 11.5–14.5)
SODIUM SERPL-SCNC: 137 MMOL/L — SIGNIFICANT CHANGE UP (ref 135–146)
TROPONIN T SERPL-MCNC: <0.01 NG/ML — SIGNIFICANT CHANGE UP
WBC # BLD: 9.66 K/UL — SIGNIFICANT CHANGE UP (ref 4.8–10.8)
WBC # FLD AUTO: 9.66 K/UL — SIGNIFICANT CHANGE UP (ref 4.8–10.8)

## 2019-06-25 PROCEDURE — 99285 EMERGENCY DEPT VISIT HI MDM: CPT

## 2019-06-25 PROCEDURE — 93010 ELECTROCARDIOGRAM REPORT: CPT

## 2019-06-25 PROCEDURE — 71045 X-RAY EXAM CHEST 1 VIEW: CPT | Mod: 26

## 2019-06-25 NOTE — ED PROVIDER NOTE - CLINICAL SUMMARY MEDICAL DECISION MAKING FREE TEXT BOX
I personally evaluated the patient. I reviewed the Resident’s or Physician Assistant’s note (as assigned above), and agree with the findings and plan except as documented in my note. Labs and imaging reviewed, social work aware, patient will be admitted for placement, too many steps in patient's house, she is too sob to go up the stairs into bedroom

## 2019-06-25 NOTE — H&P ADULT - NSICDXPASTMEDICALHX_GEN_ALL_CORE_FT
PAST MEDICAL HISTORY:  Congestive heart failure     COPD (chronic obstructive pulmonary disease)     Dementia     History of left bundle branch block     Hyperlipidemia     Hypertension     Metabolic encephalopathy     Obstructive sleep apnea     Presenile dementia with depression     Urinary tract infection

## 2019-06-25 NOTE — ED PROVIDER NOTE - ATTENDING CONTRIBUTION TO CARE
85 yo F w/ hx of CHF, dementia, HTN, HLD, JAVI, COPD p/w exertional SOB x 1 day, patient came in for social work eval and re-admission into rehab. Patient denies cp, no hemoptysis, no n/v/d, no loc. Patient has exertional sob    CONSTITUTIONAL: Well-developed; well-nourished; in no acute distress. Sitting up and providing appropriate history and physical examination  SKIN: skin exam is warm and dry, no acute rash.  HEAD: Normocephalic; atraumatic.  EYES: PERRL, 3 mm bilateral, no nystagmus, EOM intact; conjunctiva and sclera clear.  ENT: No nasal discharge; airway clear.  NECK: Supple; non tender. + full passive ROM in all directions. No JVD  CARD: S1, S2 normal; no murmurs, gallops, or rubs. Regular rate and rhythm. + Symmetric Strong Pulses  RESP: No wheezes, rales or rhonchi. Good air movement bilaterally  ABD: soft; non-distended; non-tender. No Rebound, No Guarding, No signs of peritonitis, No CVA tenderness. No pulsatile abdominal mass. + Strong and Symmetric Pulses  EXT: Normal ROM. No clubbing, cyanosis or edema. Dp and Pt Pulses intact. Cap refill less than 3 seconds  NEURO: CN 2-12 intact, normal finger to nose, normal romberg, stable gait, no sensory or motor deficits, Alert, oriented, grossly unremarkable. No Focal deficits. GCS 15. NIH 0  PSYCH: Cooperative, appropriate.

## 2019-06-25 NOTE — H&P ADULT - NSHPREVIEWOFSYSTEMS_GEN_ALL_CORE
CONSTITUTIONAL: No weakness, fevers or chills  RESPIRATORY: No cough, wheezing, hemoptysis; No shortness of breath  CARDIOVASCULAR: No chest pain or palpitations  GASTROINTESTINAL: No abdominal or epigastric pain. No nausea, vomiting, or hematemesis; No diarrhea or constipation. No melena or hematochezia.  GENITOURINARY: No dysuria, frequency or hematuria  NEUROLOGICAL: No numbness or weakness  SKIN: No itching, rashes CONSTITUTIONAL: No weakness, fevers or chills  RESPIRATORY: No cough, wheezing, hemoptysis; No shortness of breath at rest  CARDIOVASCULAR: No chest pain or palpitations  GASTROINTESTINAL: No abdominal or epigastric pain. No nausea, vomiting, or hematemesis; No diarrhea or constipation. No melena or hematochezia.  GENITOURINARY: No dysuria, frequency or hematuria  NEUROLOGICAL: No numbness or weakness  SKIN: No itching, rashes

## 2019-06-25 NOTE — ED ADULT TRIAGE NOTE - CHIEF COMPLAINT QUOTE
shortness of breath and chest pain, patient recently admitted for UTI and shortness of breath.  patient denies any s/s at this time.

## 2019-06-25 NOTE — ED PROVIDER NOTE - NS ED ROS FT
Constitutional: No fevers.   Eyes:  No visual changes, eye pain or discharge.  ENMT:  No hearing changes, pain, no sore throat or runny nose, no difficulty swallowing  Cardiac:  +sob on exertion. No chest pain.   Respiratory:  No cough. No hemoptysis. hx of COPD.   GI:  No nausea, vomiting, diarrhea or abdominal pain.  :  No dysuria, frequency or burning.  MS:  No myalgia, muscle weakness, joint pain or back pain.  Neuro:  diffuse weakness.   Skin:  No skin rash.   Endocrine: No history of thyroid disease or diabetes.

## 2019-06-25 NOTE — ED PROVIDER NOTE - OBJECTIVE STATEMENT
Patient is an 85 yo F w/ hx of CHF, dementia, HTN, HLD, JAVI, COPD p/w exertional SOB x 1 day. Patient developed SOB on exertion today; per family, patient was only able to take 2 steps before feeling SOB; no chest pain, no cough, no fever. Patient was recently discharged from the hospital for UTI; sent to rehab; per daughter, patient was taken out of rehab earlier is having difficulty ambulating since. No N/V/D/abdominal pain; no lower extremity edema. Patient on 20mg Lasix every other day.

## 2019-06-25 NOTE — H&P ADULT - HISTORY OF PRESENT ILLNESS
84 years old female with reported PMH of CHF with unknown EF, dementia, HTN, HLD, JAVI, COPD not on home O2, presented with dyspnea on exertion for 1 day.    Patient was recently admitted to [facility] for change of mental status and discharged on 6/12 to SNF for short term rehab. Patient was then discharged home but she has been having difficult ambulating. 84 years old female with reported PMH of CHF with unknown EF, dementia, HTN, HLD, JAVI, COPD not on home O2, presented with dyspnea on exertion and difficulty ambulation for 2 weeks. Patient's daughter is at bedside and providing history. Patient was recently admitted to [facility] for change of mental status and discharged on 6/12 to SNF for short term rehab. Patient only stayed in SNF for 24 hours because she requested to go home with home PT. However patient has been having difficult ambulating up and down steps and experiencing shortness of breath doing that. Daughter believes that patient will need more rehab from SNF so she brought the patient back to ED in order to transition her back to SNF. Patient denies fever/chills, cough, chest pain, shortness of breath at rest, orthopnea, abdominal pain, nausea/vomiting, diarrhea/constipation, urinary symptoms, or change of mental status. At baseline patient is able to ambulate on flat ground holding on furniture and with assistance without shortness of breath.

## 2019-06-25 NOTE — H&P ADULT - ATTENDING COMMENTS
Patient seen and examined independently. I agree with the resident's note, physical exam, and plan except as below.  Vital Signs Last 24 Hrs  T(C): 35.6 (26 Jun 2019 18:20), Max: 36.3 (26 Jun 2019 16:25)  T(F): 96 (26 Jun 2019 18:20), Max: 97.3 (26 Jun 2019 16:25)  HR: 67 (26 Jun 2019 18:20) (64 - 67)  BP: 187/65 (26 Jun 2019 18:20) (142/78 - 187/65)  BP(mean): --  RR: 18 (26 Jun 2019 18:20) (18 - 18)  SpO2: 96% (26 Jun 2019 19:39) (96% - 98%)  PE  nad  aaox3  [id number]  ctabl  soft ntnd+bs  no cce    #mild sob on exertion due to deconditioning with underlying copd/CHF likely diastolic   #chronic copd - pt states she feels fine without inhalers - was previously on spiriva - no acute exacerbation  #chronic likely diastolic chf - no decompensation - cont home meds  #debility - pt/rehab - pt went to snf after last admission but daughter brought her home - but now states that pt needs snf bec she was unable to get her up the stairs and needs more assistance  pt/rehab  CM team to follow for snf placment   daughter already spoke with NH - need insurance approval

## 2019-06-25 NOTE — H&P ADULT - NSHPLABSRESULTS_GEN_ALL_CORE
14.5   9.66  )-----------( 140      ( 25 Jun 2019 17:55 )             41.5     137  |  99  |  10  ----------------------------<  104<H>  3.6   |  25  |  0.7    Ca    8.6      25 Jun 2019 17:55    TPro  7.7  /  Alb  3.5  /  TBili  0.9  /  DBili  x   /  AST  31  /  ALT  27  /  AlkPhos  56  06-25      CARDIAC MARKERS ( 25 Jun 2019 17:55 )  x     / <0.01 ng/mL / x     / x     / x        Serum Pro-Brain Natriuretic Peptide: 2602 pg/mL (06.25.19 @ 17:55)

## 2019-06-25 NOTE — ED PROVIDER NOTE - PHYSICAL EXAMINATION
CONSTITUTIONAL: Well-developed; well-nourished; in no acute distress.   SKIN: warm, dry  HEAD: Normocephalic; atraumatic.  EYES: PERRL, EOMI, no conjunctival erythema  ENT: No nasal discharge; airway clear.  NECK: Supple; non tender.  CARD: S1, S2 normal; no murmurs, gallops, or rubs. Regular rate and rhythm.   RESP: No wheezes, rales or rhonchi.  ABD: soft ntnd.   EXT: Normal ROM.  No clubbing, cyanosis or edema. No tenderness to palpation of the lower extremities bilaterally.   NEURO: Alert, grossly unremarkable.  PSYCH: Cooperative, appropriate.

## 2019-06-25 NOTE — H&P ADULT - ASSESSMENT
84 years old female with reported PMH of CHF with unknown EF, dementia, HTN, HLD, JAVI, COPD not on home O2, presented with dyspnea on exertion for 1 day.    # Acute exacerbation on chronic CHF with unknown EF  - BNP 2600      # COPD    # HTN    # DLD    # JAVI    # Dementia      DVT ppx:  GI ppx:  Diet:  Activity:  Code status:  Dispo: 84 years old female with reported PMH of CHF with unknown EF, dementia, HTN, HLD, JAVI, COPD not on home O2, presented with dyspnea on exertion and difficulty ambulation for 2 weeks.    # Difficulty ambulation  - Patient has been having difficulty ambulating, especially going up/down stair  - Patient is unsafe to live alone and will need SNF rehab to improve functionality  - Planning to return to [facility] once medically cleared  - PT/Rehab consulted      # Congestive heart failure with unknown EF  - BNP 2600, but not in acute exacerbation  - CXR shows stable mild right pleural effusion, no increase in pulmonary vasculature, pending official reading  - Continue home dose Lasix 20mg PO QD 3 times per week  - Continue Coreg 6.25mg QD, and lisinopril 10mg QD  - Check Echo  - Follow up with Dr. Kaur outpatient  - No need for tele monitor      # COPD - stable  - Has been off inhaler for 2 years, not in exacerbation  - Continue to monitor    # HTN  - Well-controlled  - Continue Coreg and Lisinopril    # DLD  - Has been off statin due to previous abnormal LFTs as per Dr. Hoffman    # JAVI  - Unable to tolerate CPAP    # Dementia  - Stable, no change of mental status  - Continue Zoloft 50mg QD and Aricept 10mg QD  - Avoid antipsychotics use for agitation, try verbal re-direction first      DVT ppx: Lovenox  GI ppx: Not indicated  Diet: DASH, fluid restriction < 1L  Activity: ambulate with assistance  Code status: full code  Dispo: acute, pending PT/Rehab

## 2019-06-25 NOTE — H&P ADULT - NSHPPHYSICALEXAM_GEN_ALL_CORE
GENERAL: NAD, lying in bed comfortably  HEAD:  Atraumatic, Normocephalic  EYES: EOMI, PERRLA, conjunctiva and sclera clear  ENT: Moist mucous membranes  NECK: Supple, No JVD  CHEST/LUNG: Clear to auscultation bilaterally; No rales, rhonchi, wheezing, or rubs. Unlabored respirations  HEART: Regular rate and rhythm; No murmurs, rubs, or gallops  ABDOMEN: Bowel sounds present; Soft, Nontender, Nondistended. No hepatomegally  EXTREMITIES:  2+ Peripheral Pulses, brisk capillary refill. No clubbing, cyanosis, or edema  NERVOUS SYSTEM:  Alert & Oriented X3, speech clear. No deficits   MSK: FROM all 4 extremities, full and equal strength  SKIN: No rashes or lesions GENERAL: NAD, obese female, lying in bed comfortably  HEAD: Atraumatic, Normocephalic  EYES: EOMI, PERRLA, conjunctiva pink and cornea white  ENT: Moist mucous membranes  NECK: Supple, No JVD  CHEST/LUNG: Clear to auscultation bilaterally; No rales, rhonchi, wheezing, or rubs. Unlabored respirations  HEART: Regular rate and rhythm; No murmurs, rubs, or gallops  ABDOMEN: Bowel sounds present; Obese abdomen, soft, Nontender, Nondistended. No hepatomegaly  EXTREMITIES:  2+ Peripheral Pulses, brisk capillary refill. No petal edema  NERVOUS SYSTEM:  Alert & Oriented to person, place, time, but not situation, speech clear. No deficits   MSK: FROM all 4 extremities, full and equal strength  SKIN: No rashes or lesions

## 2019-06-25 NOTE — H&P ADULT - NSHPSOCIALHISTORY_GEN_ALL_CORE
Former smoker, quit 26 years ago, denies alcohol and illicit drug use      Lives alone; requires assistance on ambulation, ambulate with walker; has private hire at home to assist with ADLs

## 2019-06-26 ENCOUNTER — APPOINTMENT (OUTPATIENT)
Dept: CARDIOLOGY | Facility: CLINIC | Age: 84
End: 2019-06-26

## 2019-06-26 LAB
ALBUMIN SERPL ELPH-MCNC: 3.3 G/DL — LOW (ref 3.5–5.2)
ALP SERPL-CCNC: 51 U/L — SIGNIFICANT CHANGE UP (ref 30–115)
ALT FLD-CCNC: 25 U/L — SIGNIFICANT CHANGE UP (ref 0–41)
ANION GAP SERPL CALC-SCNC: 14 MMOL/L — SIGNIFICANT CHANGE UP (ref 7–14)
AST SERPL-CCNC: 29 U/L — SIGNIFICANT CHANGE UP (ref 0–41)
BASOPHILS # BLD AUTO: 0.07 K/UL — SIGNIFICANT CHANGE UP (ref 0–0.2)
BASOPHILS NFR BLD AUTO: 0.9 % — SIGNIFICANT CHANGE UP (ref 0–1)
BILIRUB SERPL-MCNC: 1 MG/DL — SIGNIFICANT CHANGE UP (ref 0.2–1.2)
BUN SERPL-MCNC: 10 MG/DL — SIGNIFICANT CHANGE UP (ref 10–20)
CALCIUM SERPL-MCNC: 8.5 MG/DL — SIGNIFICANT CHANGE UP (ref 8.5–10.1)
CHLORIDE SERPL-SCNC: 99 MMOL/L — SIGNIFICANT CHANGE UP (ref 98–110)
CO2 SERPL-SCNC: 24 MMOL/L — SIGNIFICANT CHANGE UP (ref 17–32)
CREAT SERPL-MCNC: 0.7 MG/DL — SIGNIFICANT CHANGE UP (ref 0.7–1.5)
EOSINOPHIL # BLD AUTO: 0.14 K/UL — SIGNIFICANT CHANGE UP (ref 0–0.7)
EOSINOPHIL NFR BLD AUTO: 1.7 % — SIGNIFICANT CHANGE UP (ref 0–8)
GLUCOSE SERPL-MCNC: 118 MG/DL — HIGH (ref 70–99)
HCT VFR BLD CALC: 42.1 % — SIGNIFICANT CHANGE UP (ref 37–47)
HGB BLD-MCNC: 14.4 G/DL — SIGNIFICANT CHANGE UP (ref 12–16)
IMM GRANULOCYTES NFR BLD AUTO: 0.4 % — HIGH (ref 0.1–0.3)
LYMPHOCYTES # BLD AUTO: 3.07 K/UL — SIGNIFICANT CHANGE UP (ref 1.2–3.4)
LYMPHOCYTES # BLD AUTO: 38 % — SIGNIFICANT CHANGE UP (ref 20.5–51.1)
MAGNESIUM SERPL-MCNC: 1.9 MG/DL — SIGNIFICANT CHANGE UP (ref 1.8–2.4)
MCHC RBC-ENTMCNC: 30.4 PG — SIGNIFICANT CHANGE UP (ref 27–31)
MCHC RBC-ENTMCNC: 34.2 G/DL — SIGNIFICANT CHANGE UP (ref 32–37)
MCV RBC AUTO: 89 FL — SIGNIFICANT CHANGE UP (ref 81–99)
MONOCYTES # BLD AUTO: 0.68 K/UL — HIGH (ref 0.1–0.6)
MONOCYTES NFR BLD AUTO: 8.4 % — SIGNIFICANT CHANGE UP (ref 1.7–9.3)
NEUTROPHILS # BLD AUTO: 4.08 K/UL — SIGNIFICANT CHANGE UP (ref 1.4–6.5)
NEUTROPHILS NFR BLD AUTO: 50.6 % — SIGNIFICANT CHANGE UP (ref 42.2–75.2)
NRBC # BLD: 0 /100 WBCS — SIGNIFICANT CHANGE UP (ref 0–0)
PLATELET # BLD AUTO: 124 K/UL — LOW (ref 130–400)
POTASSIUM SERPL-MCNC: 3.6 MMOL/L — SIGNIFICANT CHANGE UP (ref 3.5–5)
POTASSIUM SERPL-SCNC: 3.6 MMOL/L — SIGNIFICANT CHANGE UP (ref 3.5–5)
PROT SERPL-MCNC: 7.1 G/DL — SIGNIFICANT CHANGE UP (ref 6–8)
RBC # BLD: 4.73 M/UL — SIGNIFICANT CHANGE UP (ref 4.2–5.4)
RBC # FLD: 14.1 % — SIGNIFICANT CHANGE UP (ref 11.5–14.5)
SODIUM SERPL-SCNC: 137 MMOL/L — SIGNIFICANT CHANGE UP (ref 135–146)
WBC # BLD: 8.07 K/UL — SIGNIFICANT CHANGE UP (ref 4.8–10.8)
WBC # FLD AUTO: 8.07 K/UL — SIGNIFICANT CHANGE UP (ref 4.8–10.8)

## 2019-06-26 PROCEDURE — 99223 1ST HOSP IP/OBS HIGH 75: CPT

## 2019-06-26 RX ORDER — LISINOPRIL 2.5 MG/1
10 TABLET ORAL DAILY
Refills: 0 | Status: DISCONTINUED | OUTPATIENT
Start: 2019-06-26 | End: 2019-06-28

## 2019-06-26 RX ORDER — FUROSEMIDE 40 MG
20 TABLET ORAL
Refills: 0 | Status: DISCONTINUED | OUTPATIENT
Start: 2019-06-26 | End: 2019-06-28

## 2019-06-26 RX ORDER — PREGABALIN 225 MG/1
1000 CAPSULE ORAL DAILY
Refills: 0 | Status: DISCONTINUED | OUTPATIENT
Start: 2019-06-26 | End: 2019-06-28

## 2019-06-26 RX ORDER — ENOXAPARIN SODIUM 100 MG/ML
40 INJECTION SUBCUTANEOUS DAILY
Refills: 0 | Status: DISCONTINUED | OUTPATIENT
Start: 2019-06-26 | End: 2019-06-28

## 2019-06-26 RX ORDER — CARVEDILOL PHOSPHATE 80 MG/1
6.25 CAPSULE, EXTENDED RELEASE ORAL DAILY
Refills: 0 | Status: DISCONTINUED | OUTPATIENT
Start: 2019-06-26 | End: 2019-06-28

## 2019-06-26 RX ORDER — SERTRALINE 25 MG/1
50 TABLET, FILM COATED ORAL DAILY
Refills: 0 | Status: DISCONTINUED | OUTPATIENT
Start: 2019-06-26 | End: 2019-06-28

## 2019-06-26 RX ORDER — DONEPEZIL HYDROCHLORIDE 10 MG/1
10 TABLET, FILM COATED ORAL AT BEDTIME
Refills: 0 | Status: DISCONTINUED | OUTPATIENT
Start: 2019-06-26 | End: 2019-06-28

## 2019-06-26 RX ORDER — CHLORHEXIDINE GLUCONATE 213 G/1000ML
1 SOLUTION TOPICAL
Refills: 0 | Status: DISCONTINUED | OUTPATIENT
Start: 2019-06-26 | End: 2019-06-28

## 2019-06-26 RX ORDER — ASPIRIN/CALCIUM CARB/MAGNESIUM 324 MG
81 TABLET ORAL DAILY
Refills: 0 | Status: DISCONTINUED | OUTPATIENT
Start: 2019-06-26 | End: 2019-06-28

## 2019-06-26 RX ADMIN — DONEPEZIL HYDROCHLORIDE 10 MILLIGRAM(S): 10 TABLET, FILM COATED ORAL at 21:45

## 2019-06-26 RX ADMIN — CARVEDILOL PHOSPHATE 6.25 MILLIGRAM(S): 80 CAPSULE, EXTENDED RELEASE ORAL at 05:51

## 2019-06-26 RX ADMIN — ENOXAPARIN SODIUM 40 MILLIGRAM(S): 100 INJECTION SUBCUTANEOUS at 12:39

## 2019-06-26 RX ADMIN — PREGABALIN 1000 MICROGRAM(S): 225 CAPSULE ORAL at 12:39

## 2019-06-26 RX ADMIN — Medication 81 MILLIGRAM(S): at 12:38

## 2019-06-26 RX ADMIN — LISINOPRIL 10 MILLIGRAM(S): 2.5 TABLET ORAL at 05:51

## 2019-06-26 RX ADMIN — SERTRALINE 50 MILLIGRAM(S): 25 TABLET, FILM COATED ORAL at 12:39

## 2019-06-26 RX ADMIN — Medication 20 MILLIGRAM(S): at 05:51

## 2019-06-26 NOTE — PROGRESS NOTE ADULT - SUBJECTIVE AND OBJECTIVE BOX
OPAL MCCARTY 84y Female  MRN#: [id number]     SUBJECTIVE  Patient is a 84y old Female who presents with a chief complaint of Dyspnea on exertion, difficulty ambulation (26 Jun 2019 12:34)  Currently admitted to medicine with the primary diagnosis of SOB (shortness of breath) on exertion    OBJECTIVE  PAST MEDICAL & SURGICAL HISTORY  Obstructive sleep apnea  Presenile dementia with depression  History of left bundle branch block  Metabolic encephalopathy  Urinary tract infection  Dementia  Hyperlipidemia  Hypertension  COPD (chronic obstructive pulmonary disease)  Congestive heart failure  S/P removal of ovarian cyst  H/O lumpectomy  History of appendectomy    ALLERGIES:  No Known Allergies    MEDICATIONS:  STANDING MEDICATIONS  aspirin enteric coated 81 milliGRAM(s) Oral daily  carvedilol 6.25 milliGRAM(s) Oral daily  chlorhexidine 4% Liquid 1 Application(s) Topical <User Schedule>  cyanocobalamin 1000 MICROGram(s) Oral daily  donepezil 10 milliGRAM(s) Oral at bedtime  enoxaparin Injectable 40 milliGRAM(s) SubCutaneous daily  furosemide    Tablet 20 milliGRAM(s) Oral <User Schedule>  lisinopril 10 milliGRAM(s) Oral daily  sertraline 50 milliGRAM(s) Oral daily    PRN MEDICATIONS      VITAL SIGNS: Last 24 Hours  T(C): 36.3 (26 Jun 2019 16:25), Max: 36.3 (26 Jun 2019 16:25)  T(F): 97.3 (26 Jun 2019 16:25), Max: 97.3 (26 Jun 2019 16:25)  HR: 65 (26 Jun 2019 16:25) (64 - 68)  BP: 151/63 (26 Jun 2019 16:25) (136/64 - 183/81)  BP(mean): --  RR: 18 (26 Jun 2019 16:25) (17 - 18)  SpO2: 97% (26 Jun 2019 16:25) (97% - 98%)    LABS:                        14.4   8.07  )-----------( 124      ( 26 Jun 2019 07:52 )             42.1     06-26    137  |  99  |  10  ----------------------------<  118<H>  3.6   |  24  |  0.7    Ca    8.5      26 Jun 2019 07:52  Mg     1.9     06-26    TPro  7.1  /  Alb  3.3<L>  /  TBili  1.0  /  DBili  x   /  AST  29  /  ALT  25  /  AlkPhos  51  06-26  CARDIAC MARKERS ( 25 Jun 2019 17:55 )  x     / <0.01 ng/mL / x     / x     / x        PHYSICAL EXAM:  GENERAL: NAD, obese female, lying in bed comfortably  	HEAD: Atraumatic, Normocephalic  	EYES: EOMI, PERRLA, conjunctiva pink and cornea white  	ENT: Moist mucous membranes  	NECK: Supple, No JVD  	CHEST/LUNG: Clear to auscultation bilaterally; No rales, rhonchi, wheezing, or rubs. Unlabored respirations  	HEART: Regular rate and rhythm; No murmurs, rubs, or gallops  	ABDOMEN: Bowel sounds present; Obese abdomen, soft, Nontender, Nondistended. No hepatomegaly  	EXTREMITIES:  2+ Peripheral Pulses, brisk capillary refill. No petal edema  	NERVOUS SYSTEM:  Alert & Oriented to person, place, time, but not situation, speech clear. No deficits     ASSESSMENT & PLAN  84 years old female with reported PMH of CHF with unknown EF, dementia, HTN, HLD, JAVI, COPD not on home O2, presented with dyspnea on exertion and difficulty ambulation for 2 weeks.    # Difficulty ambulation  - Patient has been having difficulty ambulating, especially going up/down stair  -Was in nursing home for one day and went home from nursing home because she didn't like it.  -Again brought back from home with difficulty ambulation and weakness.  -Daughter wants nursing home placement.  -Physiatry - Nursing home.  -PT pending.    # Congestive heart failure with unknown EF  -Echo pending.  - BNP 2600, but not in acute exacerbation  - CXR shows stable mild right basilar opacity  - Continue home dose Lasix 20mg PO QD 3 times per week  - Continue Coreg 6.25mg QD, and lisinopril 10mg QD  - Follow up with Dr. Kaur outpatient  - No need for tele monitor  -Will go to regular floor    # COPD - stable  - Has been off inhaler for 2 years, not in exacerbation  - Continue to monitor    # HTN  - Well-controlled  - Continue Coreg and Lisinopril    # DLD  - Has been off statin due to previous abnormal LFTs as per Dr. Hoffman    # JAVI  - Unable to tolerate CPAP    # Dementia  - Stable, no change of mental status  - Continue Zoloft 50mg QD and Aricept 10mg QD  - Avoid antipsychotics use for agitation, try verbal re-direction first      DVT ppx: Lovenox  GI ppx: Not indicated  Diet: DASH, fluid restriction < 1L  Activity: ambulate with assistance  Code status: full code  Dispo: Pending PT and ECHO and nursing home placement by

## 2019-06-26 NOTE — CONSULT NOTE ADULT - SUBJECTIVE AND OBJECTIVE BOX
HPI:  84 years old female with reported PMH of CHF with unknown EF, dementia, HTN, HLD, JAVI, COPD not on home O2, presented with dyspnea on exertion and difficulty ambulation for 2 weeks. Patient's daughter is at bedside and providing history. Patient was recently admitted to [facility] for change of mental status and discharged on 6/12 to SNF for short term rehab. Patient only stayed in SNF for 24 hours because she requested to go home with home PT. However patient has been having difficult ambulating up and down steps and experiencing shortness of breath doing that. Daughter believes that patient will need more rehab from SNF so she brought the patient back to ED in order to transition her back to SNF. Patient denies fever/chills, cough, chest pain, shortness of breath at rest, orthopnea, abdominal pain, nausea/vomiting, diarrhea/constipation, urinary symptoms, or change of mental status. At baseline patient is able to ambulate on flat ground holding on furniture and with assistance without shortness of breath. (25 Jun 2019 23:25)      PAST MEDICAL & SURGICAL HISTORY:  Obstructive sleep apnea  Presenile dementia with depression  History of left bundle branch block  Metabolic encephalopathy  Urinary tract infection  Dementia  Hyperlipidemia  Hypertension  COPD (chronic obstructive pulmonary disease)  Congestive heart failure  S/P removal of ovarian cyst  H/O lumpectomy  History of appendectomy      Hospital Course:    TODAY'S SUBJECTIVE & REVIEW OF SYMPTOMS:     Constitutional WNL   Cardio WNL   Resp WNL   GI WNL  Heme WNL  Endo WNL  Skin WNL  MSK Weakness  Neuro WNL  Cognitive WNL  Psych WNL      MEDICATIONS  (STANDING):  aspirin enteric coated 81 milliGRAM(s) Oral daily  carvedilol 6.25 milliGRAM(s) Oral daily  chlorhexidine 4% Liquid 1 Application(s) Topical <User Schedule>  cyanocobalamin 1000 MICROGram(s) Oral daily  donepezil 10 milliGRAM(s) Oral at bedtime  enoxaparin Injectable 40 milliGRAM(s) SubCutaneous daily  furosemide    Tablet 20 milliGRAM(s) Oral <User Schedule>  lisinopril 10 milliGRAM(s) Oral daily  sertraline 50 milliGRAM(s) Oral daily    MEDICATIONS  (PRN):      FAMILY HISTORY:  FHx: lung cancer: father      Allergies    No Known Allergies    Intolerances        SOCIAL HISTORY:    [  ] Etoh  [  ] Smoking  [  ] Substance abuse     Home Environment:  [ x ] Home Alone  [  ] Lives with Family  [  ] Home Health Aid    Dwelling:  [  ] Apartment  [ x ] Private House  [  ] Adult Home  [  ] Skilled Nursing Facility      [  ] Short Term  [  ] Long Term  [ x ] Stairs       Elevator [  ]    FUNCTIONAL STATUS PTA: (Check all that apply)  Ambulation: [   ]Independent    [  ] Dependent     [  ] Non-Ambulatory  Assistive Device: [  ] SA Cane  [  ]  Q Cane  [x  ] Walker  [  ]  Wheelchair  ADL : [  ] Independent  [x  ]  Dependent       Vital Signs Last 24 Hrs  T(C): 36.1 (26 Jun 2019 07:34), Max: 36.4 (25 Jun 2019 16:11)  T(F): 97 (26 Jun 2019 07:34), Max: 97.5 (25 Jun 2019 16:11)  HR: 66 (26 Jun 2019 07:34) (64 - 70)  BP: 142/78 (26 Jun 2019 07:34) (136/64 - 183/81)  BP(mean): --  RR: 18 (26 Jun 2019 07:34) (17 - 18)  SpO2: 98% (26 Jun 2019 07:34) (96% - 98%)      PHYSICAL EXAM: Awake  GENERAL: NAD  HEAD:  Atraumatic, Normocephalic  CHEST/LUNG: Clear   HEART: S1S2+  ABDOMEN: Soft, Nontender  EXTREMITIES:  no calf tenderness    NERVOUS SYSTEM:  Cranial Nerves 2-12 intact [  ] Abnormal  [  ]  ROM: WFL all extremities [x  ]  Abnormal [  ]  Motor Strength: WFL all extremities  [  ]  Abnormal [x  ]4/5 all ext  Sensation: intact to light touch [  ] Abnormal [  ]  Reflexes: Symmetric [  ]  Abnormal [  ]    FUNCTIONAL STATUS:  Bed Mobility: Independent [  ]  Supervision [  ]  Needs Assistance [ x ]  N/A [  ]  Transfers: Independent [  ]  Supervision [  ]  Needs Assistance [x  ]  N/A [  ]   Ambulation: Independent [  ]  Supervision [  ]  Needs Assistance [  ]  N/A [  ]  ADL: Independent [  ] Requires Assistance [  ] N/A [  ]      LABS:                        14.4   8.07  )-----------( 124      ( 26 Jun 2019 07:52 )             42.1     06-26    137  |  99  |  10  ----------------------------<  118<H>  3.6   |  24  |  0.7    Ca    8.5      26 Jun 2019 07:52  Mg     1.9     06-26    TPro  7.1  /  Alb  3.3<L>  /  TBili  1.0  /  DBili  x   /  AST  29  /  ALT  25  /  AlkPhos  51  06-26          RADIOLOGY & ADDITIONAL STUDIES:    Assesment:

## 2019-06-26 NOTE — CONSULT NOTE ADULT - ASSESSMENT
IMPRESSION: Rehab of gait dysfunction      PRECAUTIONS: [  ] Cardiac  [  ] Respiratory  [  ] Seizures [  ] Contact Isolation  [  ] Droplet Isolation  [  ] Other    Weight Bearing Status:     RECOMMENDATION:    Out of Bed to Chair     DVT/Decubiti Prophylaxis    REHAB PLAN:     [ x  ] Bedside P/T 3-5 times a week   [   ]   Bedside O/T  2-3 times a week             [   ] No Rehab Therapy Indicated                   [   ]  Speech Therapy   Conditioning/ROM                                    ADL  Bed Mobility                                               Conditioning/ROM  Transfers                                                     Bed Mobility  Sitting /Standing Balance                         Transfers                                        Gait Training                                               Sitting/Standing Balance  Stair Training [   ]Applicable                    Home equipment Eval                                                                        Splinting  [   ] Only      GOALS:   ADL   [   ]   Independent                    Transfers  [  x ] Independent                          Ambulation  [ x  ] Independent     [ x   ] With device                            [   ]  CG                                                         [   ]  CG                                                                  [   ] CG                            [    ] Min A                                                   [   ] Min A                                                              [   ] Min  A          DISCHARGE PLAN:   [   ]  Good candidate for Intensive Rehabilitation/Hospital based                                             Will tolerate 3hrs Intensive Rehab Daily                                       [ x   ]  Short Term Rehab in Skilled Nursing Facility                                       [    ]  Home with Outpatient or  services                                         [    ]  Possible Candidate for Intensive Hospital based Rehab

## 2019-06-27 ENCOUNTER — TRANSCRIPTION ENCOUNTER (OUTPATIENT)
Age: 84
End: 2019-06-27

## 2019-06-27 LAB
ANION GAP SERPL CALC-SCNC: 12 MMOL/L — SIGNIFICANT CHANGE UP (ref 7–14)
APPEARANCE UR: ABNORMAL
BACTERIA # UR AUTO: ABNORMAL /HPF
BILIRUB UR-MCNC: ABNORMAL
BUN SERPL-MCNC: 10 MG/DL — SIGNIFICANT CHANGE UP (ref 10–20)
CALCIUM SERPL-MCNC: 8.3 MG/DL — LOW (ref 8.5–10.1)
CHLORIDE SERPL-SCNC: 101 MMOL/L — SIGNIFICANT CHANGE UP (ref 98–110)
CO2 SERPL-SCNC: 26 MMOL/L — SIGNIFICANT CHANGE UP (ref 17–32)
COLOR SPEC: ABNORMAL
COMMENT - URINE: SIGNIFICANT CHANGE UP
CREAT SERPL-MCNC: 0.7 MG/DL — SIGNIFICANT CHANGE UP (ref 0.7–1.5)
DIFF PNL FLD: ABNORMAL
GLUCOSE SERPL-MCNC: 103 MG/DL — HIGH (ref 70–99)
GLUCOSE UR QL: NEGATIVE — SIGNIFICANT CHANGE UP
HCT VFR BLD CALC: 39 % — SIGNIFICANT CHANGE UP (ref 37–47)
HGB BLD-MCNC: 13.4 G/DL — SIGNIFICANT CHANGE UP (ref 12–16)
KETONES UR-MCNC: NEGATIVE — SIGNIFICANT CHANGE UP
LEUKOCYTE ESTERASE UR-ACNC: ABNORMAL
MCHC RBC-ENTMCNC: 30.4 PG — SIGNIFICANT CHANGE UP (ref 27–31)
MCHC RBC-ENTMCNC: 34.4 G/DL — SIGNIFICANT CHANGE UP (ref 32–37)
MCV RBC AUTO: 88.4 FL — SIGNIFICANT CHANGE UP (ref 81–99)
NITRITE UR-MCNC: POSITIVE
NRBC # BLD: 0 /100 WBCS — SIGNIFICANT CHANGE UP (ref 0–0)
PH UR: 6 — SIGNIFICANT CHANGE UP (ref 5–8)
PLATELET # BLD AUTO: 117 K/UL — LOW (ref 130–400)
POTASSIUM SERPL-MCNC: 3.3 MMOL/L — LOW (ref 3.5–5)
POTASSIUM SERPL-SCNC: 3.3 MMOL/L — LOW (ref 3.5–5)
PROT UR-MCNC: 30
RBC # BLD: 4.41 M/UL — SIGNIFICANT CHANGE UP (ref 4.2–5.4)
RBC # FLD: 13.9 % — SIGNIFICANT CHANGE UP (ref 11.5–14.5)
SODIUM SERPL-SCNC: 139 MMOL/L — SIGNIFICANT CHANGE UP (ref 135–146)
SP GR SPEC: >=1.03 — SIGNIFICANT CHANGE UP (ref 1.01–1.03)
UROBILINOGEN FLD QL: 2 (ref 0.2–0.2)
WBC # BLD: 7.4 K/UL — SIGNIFICANT CHANGE UP (ref 4.8–10.8)
WBC # FLD AUTO: 7.4 K/UL — SIGNIFICANT CHANGE UP (ref 4.8–10.8)
WBC UR QL: >50 /HPF

## 2019-06-27 PROCEDURE — 99233 SBSQ HOSP IP/OBS HIGH 50: CPT

## 2019-06-27 RX ADMIN — LISINOPRIL 10 MILLIGRAM(S): 2.5 TABLET ORAL at 05:21

## 2019-06-27 RX ADMIN — ENOXAPARIN SODIUM 40 MILLIGRAM(S): 100 INJECTION SUBCUTANEOUS at 12:48

## 2019-06-27 RX ADMIN — CARVEDILOL PHOSPHATE 6.25 MILLIGRAM(S): 80 CAPSULE, EXTENDED RELEASE ORAL at 05:21

## 2019-06-27 RX ADMIN — DONEPEZIL HYDROCHLORIDE 10 MILLIGRAM(S): 10 TABLET, FILM COATED ORAL at 21:02

## 2019-06-27 RX ADMIN — PREGABALIN 1000 MICROGRAM(S): 225 CAPSULE ORAL at 12:48

## 2019-06-27 RX ADMIN — SERTRALINE 50 MILLIGRAM(S): 25 TABLET, FILM COATED ORAL at 12:48

## 2019-06-27 RX ADMIN — Medication 81 MILLIGRAM(S): at 12:47

## 2019-06-27 RX ADMIN — CHLORHEXIDINE GLUCONATE 1 APPLICATION(S): 213 SOLUTION TOPICAL at 05:21

## 2019-06-27 NOTE — DISCHARGE NOTE PROVIDER - NSDCACTIVITY_GEN_ALL_CORE
Do not make important decisions/Walking - Indoors allowed/No heavy lifting/straining/Do not drive or operate machinery/Bathing allowed

## 2019-06-27 NOTE — DISCHARGE NOTE PROVIDER - NSDCCPCAREPLAN_GEN_ALL_CORE_FT
PRINCIPAL DISCHARGE DIAGNOSIS  Diagnosis: Difficulty in walking  Assessment and Plan of Treatment: You were brought to the hospital by your daughter after you got back to the home from SNF and had difficulty in walking and getting short of breath. You were evaluated by PT and you qualify to be transferred back to SNF as per your and the care takers's wishes. There are no active issues. Echocardiogram shows changes consistent with PMHx of CHF, EF was found to be low. You also showed signs of urinary tract infection and were given testing and treatment for it.

## 2019-06-27 NOTE — PROGRESS NOTE ADULT - ASSESSMENT
#mild sob on exertion due to deconditioning with underlying copd/CHF likely diastolic   #chronic copd - pt states she feels fine without inhalers - was previously on spiriva - no acute exacerbation  #chronic likely diastolic chf - no decompensation - cont home meds  #debility - pt/rehab - pt went to snf after last admission but daughter brought her home - but now states that pt needs snf bec she was unable to get her up the stairs and needs more assistance  pt/rehab  CM team to follow for snf placment   daughter already spoke with NH - need insurance approval .  #dysuria - check ua c&s

## 2019-06-27 NOTE — PROGRESS NOTE ADULT - SUBJECTIVE AND OBJECTIVE BOX
84 years old female with reported PMH of CHF with unknown EF, dementia, HTN, HLD, JAVI, COPD not on home O2, presented with dyspnea on exertion and difficulty ambulation for 2 weeks. Patient's daughter provided history that the patient was recently admitted to [facility] for change of mental status and discharged on 6/12 to SNF for short term rehab. Patient only stayed in SNF for 24 hours because she requested to go home with home PT. However patient had been having difficult ambulating up and down steps and experiencing shortness of breath doing that. Daughter believed that patient will need more rehab from SNF so she brought the patient back to ED in order to transition her back to SNF. Patient denied fever/chills, cough, chest pain, shortness of breath at rest, orthopnea, abdominal pain, nausea/vomiting, diarrhea/constipation, urinary symptoms, or change of mental status. At baseline patient is able to ambulate on flat ground holding on furniture and with assistance without shortness of breath. An echo cardiogram was done because of her history of congestive heart failure with unknown ejection fraction before discharging.    PAST MEDICAL & SURGICAL HISTORY:  Obstructive sleep apnea  Presenile dementia with depression  History of left bundle branch block  Metabolic encephalopathy  Urinary tract infection  Dementia  Hyperlipidemia  Hypertension  COPD (chronic obstructive pulmonary disease)  Congestive heart failure  S/P removal of ovarian cyst  H/O lumpectomy  History of appendectomy      OVERNIGHT EVENTS:    SUBJECTIVE / INTERVAL HPI: Patient seen and examined at bedside.     VITAL SIGNS:  Vital Signs Last 24 Hrs  T(C): 36.4 (27 Jun 2019 13:25), Max: 36.4 (27 Jun 2019 13:25)  T(F): 97.5 (27 Jun 2019 13:25), Max: 97.5 (27 Jun 2019 13:25)  HR: 66 (27 Jun 2019 13:25) (65 - 71)  BP: 140/81 (27 Jun 2019 13:25) (140/81 - 187/65)  BP(mean): --  RR: 18 (27 Jun 2019 13:25) (18 - 18)  SpO2: 97% (27 Jun 2019 06:56) (96% - 97%)    PHYSICAL EXAM:    General: WDWN  HEENT: NC/AT; PERRL, anicteric sclera; MMM  Neck: supple  Cardiovascular: +S1/S2, RRR  Respiratory: CTA B/L; no W/R/R  Gastrointestinal: soft, NT/ND; +BSx4  Extremities: WWP; no edema, clubbing or cyanosis  Vascular: 2+ radial, DP/PT pulses B/L  Neurological: AAOx3; no focal deficits    MEDICATIONS:  MEDICATIONS  (STANDING):  aspirin enteric coated 81 milliGRAM(s) Oral daily  carvedilol 6.25 milliGRAM(s) Oral daily  chlorhexidine 4% Liquid 1 Application(s) Topical <User Schedule>  cyanocobalamin 1000 MICROGram(s) Oral daily  donepezil 10 milliGRAM(s) Oral at bedtime  enoxaparin Injectable 40 milliGRAM(s) SubCutaneous daily  furosemide    Tablet 20 milliGRAM(s) Oral <User Schedule>  lisinopril 10 milliGRAM(s) Oral daily  sertraline 50 milliGRAM(s) Oral daily    MEDICATIONS  (PRN):      ALLERGIES:  Allergies    No Known Allergies    Intolerances        LABS:                        13.4   7.40  )-----------( 117      ( 27 Jun 2019 07:07 )             39.0     06-27    139  |  101  |  10  ----------------------------<  103<H>  3.3<L>   |  26  |  0.7    Ca    8.3<L>      27 Jun 2019 07:07  Mg     1.9     06-26    TPro  7.1  /  Alb  3.3<L>  /  TBili  1.0  /  DBili  x   /  AST  29  /  ALT  25  /  AlkPhos  51  06-26        CAPILLARY BLOOD GLUCOSE          RADIOLOGY & ADDITIONAL TESTS: Reviewed.

## 2019-06-27 NOTE — DISCHARGE NOTE PROVIDER - CARE PROVIDER_API CALL
Taras Hoffman)  Internal Medicine  [address] [place] [address]  Phone: [phone]  Fax: [phone]  Follow Up Time: 1 week

## 2019-06-27 NOTE — PROGRESS NOTE ADULT - ASSESSMENT
Patient seen and examined. She is well oriented and can ambulate at will till the bathroom. PT/rehab was consulted today and an echo cardiogram was done. Patient is set to be discharged pending case management work to a skilled nursing facility.     # Difficulty ambulation  -Was in nursing home for one day and went home from nursing home because she didn't like it.  -Again brought back from home with difficulty ambulation and weakness.  -Daughter wants nursing home placement.  -Physiatry vs Nursing home.  - set to be transeferred tomorrow    # Congestive heart failure with unknown EF  -Echo done today, pending reporting.   - BNP 2600, but not in acute exacerbation  - CXR shows stable mild right basilar opacity  - Continue home dose Lasix 20mg PO QD 3 times per week  - Continue Coreg 6.25mg QD, and lisinopril 10mg QD  - Follow up with Dr. Kaur outpatient  - No need for tele monitor  -Will go to regular floor    # COPD - stable  - Has been off inhaler for 2 years, not in exacerbation  - Continue to monitor    # HTN  - Well-controlled  - Continue Coreg and Lisinopril    # DLD  - Has been off statin due to previous abnormal LFTs as per Dr. Hoffman    # JAVI  - Unable to tolerate CPAP    # Dementia  - Stable, no change of mental status  - Continue Zoloft 50mg QD and Aricept 10mg QD  - Avoid antipsychotics use for agitation, try verbal re-direction first      DVT ppx: Lovenox  GI ppx: Not indicated  Diet: DASH, fluid restriction < 1L  Activity: ambulate with assistance  Code status: full code

## 2019-06-27 NOTE — DISCHARGE NOTE PROVIDER - NSFOLLOWUPCLINICS_GEN_ALL_ED_FT
[facility] [address] [address]  Phone: [phone]  Fax:   Follow Up Time: 7-10 Days

## 2019-06-27 NOTE — PHYSICAL THERAPY INITIAL EVALUATION ADULT - ADDITIONAL COMMENTS
As per son's report pt uses RW at home, does better on flat surfaces. Pt has been having difficulty recently with stairs getting in/out of home. Son reports pt's dementia has been getting worse. Daughter comes often to assist to pt with ADL's.

## 2019-06-27 NOTE — DISCHARGE NOTE PROVIDER - HOSPITAL COURSE
84 years old female with reported PMH of CHF with unknown EF, dementia, HTN, HLD, JAVI, COPD not on home O2, presented with dyspnea on exertion and difficulty ambulation for 2 weeks. Patient's daughter provided history that the patient was recently admitted to [facility] for change of mental status and discharged on 6/12 to SNF for short term rehab. Patient only stayed in SNF for 24 hours because she requested to go home with home PT. However patient had been having difficult ambulating up and down steps and experiencing shortness of breath doing that. Daughter believed that patient will need more rehab from SNF so she brought the patient back to ED in order to transition her back to SNF. Patient denied fever/chills, cough, chest pain, shortness of breath at rest, orthopnea, abdominal pain, nausea/vomiting, diarrhea/constipation, urinary symptoms, or change of mental status. At baseline patient is able to ambulate on flat ground holding on furniture and with assistance without shortness of breath. An echo cardiogram was done because of her history of congestive heart failure with unknown ejection fraction before discharging. 84 years old female with reported PMH of CHF with unknown EF, dementia, HTN, HLD, JAVI, COPD not on home O2, presented with dyspnea on exertion and difficulty ambulation for 2 weeks. Patient's daughter provided history that the patient was recently admitted to [facility] for change of mental status and discharged on 6/12 to SNF for short term rehab. Patient only stayed in SNF for 24 hours because she requested to go home with home PT. However patient had been having difficult ambulating up and down steps and experiencing shortness of breath doing that. Daughter believed that patient will need more rehab from SNF so she brought the patient back to ED in order to transition her back to SNF. Patient denied fever/chills, cough, chest pain, shortness of breath at rest, orthopnea, abdominal pain, nausea/vomiting, diarrhea/constipation, urinary symptoms, or change of mental status. At baseline patient is able to ambulate on flat ground holding on furniture and with assistance without shortness of breath. An echo cardiogram was done because of her history of congestive heart failure with unknown ejection fraction before discharging. Patient reported of dysuria, urinary analysis showed signs of urinary tract infection, urine cultures were sent and patient was started on antibiotics.

## 2019-06-27 NOTE — PHYSICAL THERAPY INITIAL EVALUATION ADULT - CRITERIA FOR SKILLED THERAPEUTIC INTERVENTIONS
risk reduction/prevention/therapy frequency/rehab potential/functional limitations in following categories/impairments found

## 2019-06-27 NOTE — PROGRESS NOTE ADULT - SUBJECTIVE AND OBJECTIVE BOX
HOSPITALIST ATTENDING NOTE    OPAL MCCARTY  84y Female  [id number]    INTERVAL HPI/OVERNIGHT EVENTS: c/o dysuria     T(C): 36.4 (06-27-19 @ 13:25), Max: 36.4 (06-27-19 @ 13:25)  HR: 66 (06-27-19 @ 13:25) (65 - 71)  BP: 140/81 (06-27-19 @ 13:25) (140/81 - 187/65)  RR: 18 (06-27-19 @ 13:25) (18 - 18)  SpO2: 97% (06-27-19 @ 06:56) (96% - 97%)  Wt(kg): --    06-27-19 @ 07:01  -  06-27-19 @ 13:42  --------------------------------------------------------  IN: 0 mL / OUT: 250 mL / NET: -250 mL    PE  nad  aaox3  [id number]  ctabl  soft ntnd+bs  no cce      Consultant(s) Notes Reviewed:  [x ] YES  [ ] NO  Care Discussed with Consultants/Other Providers/ Housestaff [ x] YES  [ ] NO    LABS:                        13.4   7.40  )-----------( 117      ( 27 Jun 2019 07:07 )             39.0     06-27    139  |  101  |  10  ----------------------------<  103<H>  3.3<L>   |  26  |  0.7    Ca    8.3<L>      27 Jun 2019 07:07  Mg     1.9     06-26    TPro  7.1  /  Alb  3.3<L>  /  TBili  1.0  /  DBili  x   /  AST  29  /  ALT  25  /  AlkPhos  51  06-26          RADIOLOGY & ADDITIONAL TESTS:    Imaging or report Personally Reviewed:  [ ] YES  [ ] NO    Case discussed with resident    Care discussed with pt/family      HEALTH ISSUES - PROBLEM Dx:

## 2019-06-28 ENCOUNTER — TRANSCRIPTION ENCOUNTER (OUTPATIENT)
Age: 84
End: 2019-06-28

## 2019-06-28 VITALS
SYSTOLIC BLOOD PRESSURE: 133 MMHG | DIASTOLIC BLOOD PRESSURE: 62 MMHG | HEART RATE: 68 BPM | TEMPERATURE: 96 F | RESPIRATION RATE: 18 BRPM

## 2019-06-28 LAB
ANION GAP SERPL CALC-SCNC: 10 MMOL/L — SIGNIFICANT CHANGE UP (ref 7–14)
BASOPHILS # BLD AUTO: 0.06 K/UL — SIGNIFICANT CHANGE UP (ref 0–0.2)
BASOPHILS NFR BLD AUTO: 0.8 % — SIGNIFICANT CHANGE UP (ref 0–1)
BUN SERPL-MCNC: 10 MG/DL — SIGNIFICANT CHANGE UP (ref 10–20)
CALCIUM SERPL-MCNC: 8.6 MG/DL — SIGNIFICANT CHANGE UP (ref 8.5–10.1)
CHLORIDE SERPL-SCNC: 100 MMOL/L — SIGNIFICANT CHANGE UP (ref 98–110)
CO2 SERPL-SCNC: 28 MMOL/L — SIGNIFICANT CHANGE UP (ref 17–32)
CREAT SERPL-MCNC: 0.8 MG/DL — SIGNIFICANT CHANGE UP (ref 0.7–1.5)
EOSINOPHIL # BLD AUTO: 0.17 K/UL — SIGNIFICANT CHANGE UP (ref 0–0.7)
EOSINOPHIL NFR BLD AUTO: 2.4 % — SIGNIFICANT CHANGE UP (ref 0–8)
GLUCOSE SERPL-MCNC: 115 MG/DL — HIGH (ref 70–99)
HCT VFR BLD CALC: 40.5 % — SIGNIFICANT CHANGE UP (ref 37–47)
HGB BLD-MCNC: 13.8 G/DL — SIGNIFICANT CHANGE UP (ref 12–16)
IMM GRANULOCYTES NFR BLD AUTO: 0.3 % — SIGNIFICANT CHANGE UP (ref 0.1–0.3)
LYMPHOCYTES # BLD AUTO: 3.08 K/UL — SIGNIFICANT CHANGE UP (ref 1.2–3.4)
LYMPHOCYTES # BLD AUTO: 42.7 % — SIGNIFICANT CHANGE UP (ref 20.5–51.1)
MAGNESIUM SERPL-MCNC: 1.9 MG/DL — SIGNIFICANT CHANGE UP (ref 1.8–2.4)
MCHC RBC-ENTMCNC: 30.3 PG — SIGNIFICANT CHANGE UP (ref 27–31)
MCHC RBC-ENTMCNC: 34.1 G/DL — SIGNIFICANT CHANGE UP (ref 32–37)
MCV RBC AUTO: 88.8 FL — SIGNIFICANT CHANGE UP (ref 81–99)
MONOCYTES # BLD AUTO: 0.69 K/UL — HIGH (ref 0.1–0.6)
MONOCYTES NFR BLD AUTO: 9.6 % — HIGH (ref 1.7–9.3)
NEUTROPHILS # BLD AUTO: 3.19 K/UL — SIGNIFICANT CHANGE UP (ref 1.4–6.5)
NEUTROPHILS NFR BLD AUTO: 44.2 % — SIGNIFICANT CHANGE UP (ref 42.2–75.2)
NRBC # BLD: 0 /100 WBCS — SIGNIFICANT CHANGE UP (ref 0–0)
PLATELET # BLD AUTO: 121 K/UL — LOW (ref 130–400)
POTASSIUM SERPL-MCNC: 3.4 MMOL/L — LOW (ref 3.5–5)
POTASSIUM SERPL-SCNC: 3.4 MMOL/L — LOW (ref 3.5–5)
RBC # BLD: 4.56 M/UL — SIGNIFICANT CHANGE UP (ref 4.2–5.4)
RBC # FLD: 14 % — SIGNIFICANT CHANGE UP (ref 11.5–14.5)
SODIUM SERPL-SCNC: 138 MMOL/L — SIGNIFICANT CHANGE UP (ref 135–146)
WBC # BLD: 7.21 K/UL — SIGNIFICANT CHANGE UP (ref 4.8–10.8)
WBC # FLD AUTO: 7.21 K/UL — SIGNIFICANT CHANGE UP (ref 4.8–10.8)

## 2019-06-28 PROCEDURE — 99222 1ST HOSP IP/OBS MODERATE 55: CPT

## 2019-06-28 PROCEDURE — 99233 SBSQ HOSP IP/OBS HIGH 50: CPT

## 2019-06-28 RX ORDER — PREGABALIN 225 MG/1
1 CAPSULE ORAL
Qty: 0 | Refills: 0 | DISCHARGE

## 2019-06-28 RX ORDER — CEFTRIAXONE 500 MG/1
1000 INJECTION, POWDER, FOR SOLUTION INTRAMUSCULAR; INTRAVENOUS ONCE
Refills: 0 | Status: COMPLETED | OUTPATIENT
Start: 2019-06-28 | End: 2019-06-28

## 2019-06-28 RX ORDER — CIPROFLOXACIN LACTATE 400MG/40ML
1 VIAL (ML) INTRAVENOUS
Qty: 14 | Refills: 0
Start: 2019-06-28 | End: 2019-07-04

## 2019-06-28 RX ADMIN — Medication 20 MILLIGRAM(S): at 06:20

## 2019-06-28 RX ADMIN — SERTRALINE 50 MILLIGRAM(S): 25 TABLET, FILM COATED ORAL at 11:36

## 2019-06-28 RX ADMIN — Medication 81 MILLIGRAM(S): at 11:36

## 2019-06-28 RX ADMIN — ENOXAPARIN SODIUM 40 MILLIGRAM(S): 100 INJECTION SUBCUTANEOUS at 11:37

## 2019-06-28 RX ADMIN — PREGABALIN 1000 MICROGRAM(S): 225 CAPSULE ORAL at 11:37

## 2019-06-28 RX ADMIN — LISINOPRIL 10 MILLIGRAM(S): 2.5 TABLET ORAL at 06:20

## 2019-06-28 RX ADMIN — CHLORHEXIDINE GLUCONATE 1 APPLICATION(S): 213 SOLUTION TOPICAL at 06:19

## 2019-06-28 RX ADMIN — CEFTRIAXONE 100 MILLIGRAM(S): 500 INJECTION, POWDER, FOR SOLUTION INTRAMUSCULAR; INTRAVENOUS at 12:22

## 2019-06-28 RX ADMIN — CARVEDILOL PHOSPHATE 6.25 MILLIGRAM(S): 80 CAPSULE, EXTENDED RELEASE ORAL at 06:20

## 2019-06-28 NOTE — CHART NOTE - NSCHARTNOTEFT_GEN_A_CORE
Patient seen and examined.  Discharge instructions have been explained to her care taker who is her daughter.  <<<RESIDENT DISCHARGE NOTE>>>     OPAL MCCARTY  MRN-[id number]    VITAL SIGNS:  T(F): 96.3 (06-28-19 @ 13:35), Max: 98.6 (06-28-19 @ 06:19)  HR: 68 (06-28-19 @ 13:35)  BP: 133/62 (06-28-19 @ 13:35)  SpO2: --      PHYSICAL EXAMINATION:  General: NAD  Head & Neck: NCAT  Pulmonary: CTA b/l  Cardiovascular: +s1s2 RRR  Gastrointestinal/Abdomen & Pelvis: soft, NT/ND (+) bs  Neurologic/Motor: FROM x 4 5/5    TEST RESULTS:                        13.8   7.21  )-----------( 121      ( 28 Jun 2019 06:15 )             40.5       06-28    138  |  100  |  10  ----------------------------<  115<H>  3.4<L>   |  28  |  0.8    Ca    8.6      28 Jun 2019 06:15  Mg     1.9     06-28        FINAL DISCHARGE INTERVIEW:  Resident(s) Present: (Name: Tamir RN Present: (Name: Monty )    DISCHARGE MEDICATION RECONCILIATION  reviewed with Attending (Name: Caryn )    DISPOSITION:   [  ] Home,    [  ] Home with Visiting Nursing Services,   [  x ]  SNF/ NH,    [   ] Acute Rehab (4A),   [   ] Other (Specify:)

## 2019-06-28 NOTE — PROGRESS NOTE ADULT - ASSESSMENT
Patient seen and examined. She is well oriented and can ambulate at will till the bathroom. Yesterday PT/rehab was consulted and an echo cardiogram was done. Patient was set to be discharged pending case management work to a skilled nursing facility. She complained of dysuria so urine analysis was sent which showed blood, WBCs, esterase and nitrites positive. Urine cultures are sent and patient is started on rocephin. Discharge delayed.     # Difficulty ambulation  -Was in nursing home for one day and went home from nursing home because she didn't like it.  -Again brought back from home with difficulty ambulation and weakness.  -Daughter wants nursing home placement.  -Physiatry vs Nursing home.    #Dysuria  UA DONE, U/C sent   started on rocephin     # Congestive heart failure with unknown EF  -Echo done yesterday, shows low EF, LVF systolic dysfunction, grade 1 diastolic failure, mild MR regurg and AS.  - BNP 2600, but not in acute exacerbation  - CXR shows stable mild right basilar opacity  - Continue home dose Lasix 20mg PO QD 3 times per week  - Continue Coreg 6.25mg QD, and lisinopril 10mg QD  - Follow up with Dr. Kaur outpatient      # COPD - stable  - Has been off inhaler for 2 years, not in exacerbation  - Continue to monitor    # HTN  - Well-controlled  - Continue Coreg and Lisinopril    # DLD  - Has been off statin due to previous abnormal LFTs as per Dr. Hoffman    # JAVI  - Unable to tolerate CPAP    # Dementia  - Stable, no change of mental status  - Continue Zoloft 50mg QD and Aricept 10mg QD  - Avoid antipsychotics use for agitation, try verbal re-direction first      DVT ppx: Lovenox  GI ppx: Not indicated  Diet: DASH, fluid restriction < 1L  Activity: ambulate with assistance  Code status: full code

## 2019-06-28 NOTE — PROGRESS NOTE ADULT - SUBJECTIVE AND OBJECTIVE BOX
84 years old female, presented with dyspnea on exertion and difficulty ambulation for 2 weeks. Patient's daughter provided history that the patient was recently admitted to [facility] for change of mental status and discharged on 6/12 to SNF for short term rehab. Patient only stayed in SNF for 24 hours because she requested to go home with home PT. However patient had been having difficult ambulating up and down steps and experiencing shortness of breath doing that. Daughter believed that patient will need more rehab from SNF so she brought the patient back to ED in order to transition her back to SNF. Patient denied fever/chills, cough, chest pain, shortness of breath at rest, orthopnea, abdominal pain, nausea/vomiting, diarrhea/constipation, urinary symptoms, or change of mental status. At baseline patient is able to ambulate on flat ground holding on furniture and with assistance without shortness of breath. An echo cardiogram was done because of her history of congestive heart failure with unknown ejection fraction before discharging.      PAST MEDICAL & SURGICAL HISTORY:  Obstructive sleep apnea  Presenile dementia with depression  History of left bundle branch block  Metabolic encephalopathy  Urinary tract infection  Dementia  Hyperlipidemia  Hypertension  COPD (chronic obstructive pulmonary disease)  Congestive heart failure  S/P removal of ovarian cyst  H/O lumpectomy  History of appendectomy      OVERNIGHT EVENTS:    SUBJECTIVE / INTERVAL HPI: Patient seen and examined at bedside.     VITAL SIGNS:  Vital Signs Last 24 Hrs  T(C): 37 (28 Jun 2019 06:19), Max: 37 (28 Jun 2019 06:19)  T(F): 98.6 (28 Jun 2019 06:19), Max: 98.6 (28 Jun 2019 06:19)  HR: 74 (28 Jun 2019 06:19) (66 - 74)  BP: 163/70 (28 Jun 2019 06:19) (140/81 - 163/70)  BP(mean): --  RR: 17 (28 Jun 2019 06:19) (17 - 18)  SpO2: --    PHYSICAL EXAM:    General: WDWN  HEENT: NC/AT; PERRL, anicteric sclera; MMM  Neck: supple  Cardiovascular: +S1/S2, RRR  Respiratory: CTA B/L; no W/R/R  Gastrointestinal: soft, NT/ND; +BSx4  Extremities: WWP; no edema, clubbing or cyanosis  Vascular: 2+ radial, DP/PT pulses B/L  Neurological: AAOx3; no focal deficits    MEDICATIONS:  MEDICATIONS  (STANDING):  aspirin enteric coated 81 milliGRAM(s) Oral daily  carvedilol 6.25 milliGRAM(s) Oral daily  cefTRIAXone   IVPB 1000 milliGRAM(s) IV Intermittent once  chlorhexidine 4% Liquid 1 Application(s) Topical <User Schedule>  cyanocobalamin 1000 MICROGram(s) Oral daily  donepezil 10 milliGRAM(s) Oral at bedtime  enoxaparin Injectable 40 milliGRAM(s) SubCutaneous daily  furosemide    Tablet 20 milliGRAM(s) Oral <User Schedule>  lisinopril 10 milliGRAM(s) Oral daily  sertraline 50 milliGRAM(s) Oral daily    MEDICATIONS  (PRN):      ALLERGIES:  Allergies    nitrofurantoin (Short breath; Hives)    Intolerances        LABS:                        13.8   7.21  )-----------( 121      ( 28 Jun 2019 06:15 )             40.5     06-28    138  |  100  |  10  ----------------------------<  115<H>  3.4<L>   |  28  |  0.8    Ca    8.6      28 Jun 2019 06:15  Mg     1.9     06-28        Urinalysis Basic - ( 27 Jun 2019 16:45 )    Color: Orange / Appearance: Turbid / SG: >=1.030 / pH: x  Gluc: x / Ketone: Negative  / Bili: Moderate / Urobili: 2.0   Blood: x / Protein: 30 / Nitrite: Positive   Leuk Esterase: Moderate / RBC: x / WBC >50 /HPF   Sq Epi: x / Non Sq Epi: x / Bacteria: Moderate /HPF      CAPILLARY BLOOD GLUCOSE          RADIOLOGY & ADDITIONAL TESTS: Reviewed.

## 2019-06-28 NOTE — CONSULT NOTE ADULT - ASSESSMENT
84 years old female with reported PMH of CHF with unknown EF, dementia, HTN, HLD, JAVI, COPD not on home O2, presented with dyspnea on exertion and difficulty ambulation for 2 weeks    # HFrEF:  - stable, euvolemic  - EF on this admission 25-30%   - On proper HF treatment BB ACEI lasix  - no hx of CABG, no major valve abnormalities on Echo  - no acute intervention or change in treatment  - continue with same treatment, follow up with Dr Kaur in 2-4 weeks  - c/w UTI treatment

## 2019-06-28 NOTE — PROGRESS NOTE ADULT - SUBJECTIVE AND OBJECTIVE BOX
OPAL MCCARTY  84y  Female      Patient is a 84y old  Female who presents with a chief complaint of Dyspnea on exertion, difficulty ambulation (28 Jun 2019 11:02)      INTERVAL HPI/OVERNIGHT EVENTS:      ******************************* REVIEW OF SYSTEMS:**********************************************    Resting comfortably in bed.   All other review of systems negative    *********************** VITALS ******************************************    T(F): 98.6 (06-28-19 @ 06:19)  HR: 74 (06-28-19 @ 06:19) (66 - 74)  BP: 163/70 (06-28-19 @ 06:19) (140/81 - 163/70)  RR: 17 (06-28-19 @ 06:19) (17 - 18)  SpO2: --    06-27-19 @ 07:01  -  06-28-19 @ 07:00  --------------------------------------------------------  IN: 0 mL / OUT: 250 mL / NET: -250 mL            06-27-19 @ 07:01  -  06-28-19 @ 07:00  --------------------------------------------------------  IN: 0 mL / OUT: 250 mL / NET: -250 mL        ******************************** PHYSICAL EXAM:**************************************************  GENERAL: NAD    PSYCH: no agitation, baseline mentation  HEENT:     NERVOUS SYSTEM:  Alert & Oriented X2-3    PULMONARY: RICHARD, fine b/b crackles     CARDIOVASCULAR: S1S2 RRR    GI: Soft, NT, ND; BS present.    EXTREMITIES:  2+ Peripheral Pulses, No clubbing, cyanosis, or edema    LYMPH: No lymphadenopathy noted    SKIN: No rashes or lesions    ******************************************************************************************    Consultant(s) Notes Reviewed:  [x ] YES  [ ] NO    Discussed with Consultants/Other Providers [ x] YES     **************************** LABS *******************************************************                          13.8   7.21  )-----------( 121      ( 28 Jun 2019 06:15 )             40.5     06-28    138  |  100  |  10  ----------------------------<  115<H>  3.4<L>   |  28  |  0.8    Ca    8.6      28 Jun 2019 06:15  Mg     1.9     06-28        Urinalysis Basic - ( 27 Jun 2019 16:45 )    Color: Orange / Appearance: Turbid / SG: >=1.030 / pH: x  Gluc: x / Ketone: Negative  / Bili: Moderate / Urobili: 2.0   Blood: x / Protein: 30 / Nitrite: Positive   Leuk Esterase: Moderate / RBC: x / WBC >50 /HPF   Sq Epi: x / Non Sq Epi: x / Bacteria: Moderate /HPF        Lactate Trend  06-25 @ 17:55 Lactate:1.1         CAPILLARY BLOOD GLUCOSE              **************************Active Medications *******************************************  nitrofurantoin (Short breath; Hives)      aspirin enteric coated 81 milliGRAM(s) Oral daily  carvedilol 6.25 milliGRAM(s) Oral daily  chlorhexidine 4% Liquid 1 Application(s) Topical <User Schedule>  cyanocobalamin 1000 MICROGram(s) Oral daily  donepezil 10 milliGRAM(s) Oral at bedtime  enoxaparin Injectable 40 milliGRAM(s) SubCutaneous daily  furosemide    Tablet 20 milliGRAM(s) Oral <User Schedule>  lisinopril 10 milliGRAM(s) Oral daily  sertraline 50 milliGRAM(s) Oral daily      ***************************************************  RADIOLOGY & ADDITIONAL TESTS:    Imaging Personally Reviewed:  [ ] YES  [ ] NO    HEALTH ISSUES - PROBLEM Dx:

## 2019-06-28 NOTE — CONSULT NOTE ADULT - SUBJECTIVE AND OBJECTIVE BOX
Patient is a 84y old  Female who presents with a chief complaint of Dyspnea on exertion, difficulty ambulation (28 Jun 2019 13:22)    HPI:  84 years old female with reported PMH of CHF with unknown EF, dementia, HTN, HLD, JAVI, COPD not on home O2, presented with dyspnea on exertion and difficulty ambulation for 2 weeks. Patient's daughter is at bedside and providing history. Patient was recently admitted to [facility] for change of mental status and discharged on 6/12 to SNF for short term rehab. Patient only stayed in SNF for 24 hours because she requested to go home with home PT. However patient has been having difficult ambulating up and down steps and experiencing shortness of breath doing that. Daughter believes that patient will need more rehab from SNF so she brought the patient back to ED in order to transition her back to SNF. Patient denies fever/chills, cough, chest pain, shortness of breath at rest, orthopnea, abdominal pain, nausea/vomiting, diarrhea/constipation, urinary symptoms, or change of mental status. At baseline patient is able to ambulate on flat ground holding on furniture and with assistance without shortness of breath. (25 Jun 2019 23:25)    Patient seen by Dr Kaur 1 month ago and per daughter at bedside EF 35%. No recent SOB.    ROS:  All other systems reviewed and are negative    PAST MEDICAL & SURGICAL HISTORY  Obstructive sleep apnea  Presenile dementia with depression  History of left bundle branch block  Metabolic encephalopathy  Urinary tract infection  Dementia  Hyperlipidemia  Hypertension  COPD (chronic obstructive pulmonary disease)  Congestive heart failure  S/P removal of ovarian cyst  H/O lumpectomy  History of appendectomy      FAMILY HISTORY:  FAMILY HISTORY:  FHx: lung cancer: father      SOCIAL HISTORY:  []smoker  []Alcohol  []Drug    ALLERGIES:  nitrofurantoin (Short breath; Hives)      MEDICATIONS:  MEDICATIONS  (STANDING):    MEDICATIONS  (PRN):      HOME MEDICATIONS:  Home Medications:  aspirin 81 mg oral delayed release tablet: 1 tab(s) orally once a day (26 Jun 2019 00:10)  Coreg 6.25 mg oral tablet: 1 tab(s) orally once a day (26 Jun 2019 00:10)  cyanocobalamin 1000 mcg oral tablet: 1 tab(s) orally once a day (26 Jun 2019 00:10)  donepezil 10 mg oral tablet: 1 tab(s) orally once a day (at bedtime) (26 Jun 2019 00:10)  furosemide 20 mg oral tablet: 1 tab(s) orally every 48 hours (26 Jun 2019 00:10)  lisinopril 10 mg oral tablet: 1 tab(s) orally once a day (26 Jun 2019 00:10)  melatonin 5 mg oral tablet: 1 tab(s) orally once a day (at bedtime) (26 Jun 2019 00:10)  Spiriva 18 mcg inhalation capsule: 1 cap(s) inhaled once a day (26 Jun 2019 00:10)  Zoloft 50 mg oral tablet: 1 tab(s) orally once a day (26 Jun 2019 00:10)      VITALS:   T(F): 96.3 (06-28 @ 13:35), Max: 98.6 (06-28 @ 06:19)  HR: 68 (06-28 @ 13:35) (65 - 74)  BP: 133/62 (06-28 @ 13:35) (133/62 - 187/65)  BP(mean): --  RR: 18 (06-28 @ 13:35) (17 - 18)  SpO2: 97% (06-27 @ 06:56) (96% - 98%)    I&O's Summary      PHYSICAL EXAM:  GEN: Alert and oriented X 3, No acute distress  HEENT: no pallor  NECK: Supple, no JVD  LUNGS: Clear to auscultation bilaterally  CARDIOVASCULAR: S1/S2 regular, no murmurs or rubs  ABD: Soft, BS+  EXT: No Lower extremity edema/cyanosis  NEURO: Non focal  SKIN: Intact    LABS:                        13.8   7.21  )-----------( 121      ( 28 Jun 2019 06:15 )             40.5     06-28    138  |  100  |  10  ----------------------------<  115<H>  3.4<L>   |  28  |  0.8    Ca    8.6      28 Jun 2019 06:15  Mg     1.9     06-28                Troponin trend:        Hemoglobin A1C   Thyroid      RADIOLOGY:  -CXR:  -CT:  -TTE: < from: Transthoracic Echocardiogram (06.27.19 @ 11:25) >  Summary:   1. Left ventricular ejection fraction, by visual estimation, is 25 to   30%.   2. Severely decreased global left ventricular systolic function.   3. Spectral Doppler shows impaired relaxation pattern of left   ventricular myocardial filling (Grade I diastolic dysfunction).   4. Mild mitral valve regurgitation.   5. Mitral annular calcification.   6. Thickening and calcification of the anterior and posterior mitral   valve leaflets.   7. Mild aortic valve stenosis.    < end of copied text >    -CCTA:  -STRESS TEST:  -CATHETERIZATION:    ECG:    TELEMETRY EVENTS:

## 2019-06-28 NOTE — PROGRESS NOTE ADULT - ASSESSMENT
Patient seen and examined. She is well oriented and can ambulate at will till the bathroom. Yesterday PT/rehab was consulted and an echo cardiogram was done. Patient was set to be discharged pending case management work to a skilled nursing facility. She complained of dysuria so urine analysis was sent which showed blood, WBCs, esterase and nitrites positive. Urine cultures are sent and patient is started on rocephin. Discharge delayed.     #  Acute on ? chronic CHF/HFrEF  -Echo done yesterday, shows low EF, LVF systolic dysfunction, grade 1 diastolic failure, mild MR regurg and AS.  - BNP 2600, but not in acute exacerbation  - CXR shows stable mild right basilar opacity  - Continue home dose Lasix 20mg PO QD 3 times per week  - Continue Coreg 6.25mg QD, and lisinopril 10mg QD  - Follow up with Dr. Kaur outpatient >inpatient  for ischemic w/u if indicated.   - PLEASE restrict fluid to 1lier/day     # Functional decline   -Was in nursing home for one day and went home from nursing home because she didn't like it.  -Again brought back from home with difficulty ambulation and weakness.  -Daughter wants nursing home placement.  -Physiatry vs Nursing home.    #Dysuria > now resolved.  UA > positive with LE  Will start on Ciprofloxacin        # COPD - stable  - Has been off inhaler for 2 years, not in exacerbation  - Continue to monitor    # HTN  - Well-controlled  - Continue Coreg and Lisinopril    # DLD  - Has been off statin due to previous abnormal LFTs as per Dr. Hoffman    # JAVI  - Unable to tolerate CPAP    # Dementia  - Stable, no change of mental status  - Continue Zoloft 50mg QD and Aricept 10mg QD  - Avoid antipsychotics use for agitation, try verbal re-direction first      DVT ppx: Lovenox  GI ppx: Not indicated  Diet: DASH, fluid restriction < 1L  Activity: ambulate with assistance  Code status: full code      #Progress Note Handoff  Pending (specify):  Auth for SNF  Family discussion: d/w the daughter agrees with plan.   Disposition: SNF Patient seen and examined. She is well oriented and can ambulate at will till the bathroom. Yesterday PT/rehab was consulted and an echo cardiogram was done. Patient was set to be discharged pending case management work to a skilled nursing facility. She complained of dysuria so urine analysis was sent which showed blood, WBCs, esterase and nitrites positive. Urine cultures are sent and patient is started on rocephin. Discharge delayed.     #  Acute on ? chronic CHF/HFrEF  -Echo done yesterday, shows low EF, LVF systolic dysfunction, grade 1 diastolic failure, mild MR regurg and AS.  - BNP 2600, but not in acute exacerbation  - CXR shows stable mild right basilar opacity  - Continue home dose Lasix 20mg PO QD 3 times per week  - Continue Coreg 6.25mg QD, and lisinopril 10mg QD  - Follow up with Dr. Kaur outpatient >inpatient  for ischemic w/u if indicated.   - PLEASE restrict fluid to 1lier/day     # Suspected Vit B12 def >>> On cyancobalamin.     # Functional decline   -Was in nursing home for one day and went home from nursing home because she didn't like it.  -Again brought back from home with difficulty ambulation and weakness.  -Daughter wants nursing home placement.  -Physiatry vs Nursing home.    #Dysuria > now resolved.  UA > positive with LE  Will start on Ciprofloxacin        # COPD - stable  - Has been off inhaler for 2 years, not in exacerbation  - Continue to monitor    # HTN  - Well-controlled  - Continue Coreg and Lisinopril    # DLD  - Has been off statin due to previous abnormal LFTs as per Dr. Hoffman    # JAVI  - Unable to tolerate CPAP    # Dementia  - Stable, no change of mental status  - Continue Zoloft 50mg QD and Aricept 10mg QD  - Avoid antipsychotics use for agitation, try verbal re-direction first      DVT ppx: Lovenox  GI ppx: Not indicated  Diet: DASH, fluid restriction < 1L  Activity: ambulate with assistance  Code status: full code      #Progress Note Handoff  Pending (specify):  Auth for SNF  Family discussion: d/w the daughter agrees with plan.   Disposition: SNF

## 2019-06-28 NOTE — PROGRESS NOTE ADULT - REASON FOR ADMISSION
Dyspnea on exertion, difficulty ambulation

## 2019-06-28 NOTE — DISCHARGE NOTE NURSING/CASE MANAGEMENT/SOCIAL WORK - NSDCDPATPORTLINK_GEN_ALL_CORE
You can access the [facility] Patient Portal, offered by [facility], by registering with the following website: [email or web address]

## 2019-06-28 NOTE — CONSULT NOTE ADULT - ATTENDING COMMENTS
Seen / examined prior to discharge.  Above reviewed.    Chronic systolic CHF.  LBBB.    Multiple comorbidities as above.  Notably, moderate dementia.    Failure to thrive.  Readmitted after short stay at SNF.  Could not ambulate at home (weakness / dyspnea).    Breathing comfortable at rest.  No CP.  Hemodynamics stable.  Relatively compensated / near euvolemic.    EKG: NSR, PVC, LBBB  ECHO: Severe LV dysfunction.  Mild AS.  Mild MR.    RECOMMEND:  - Cont Lasix.  - Cont ASA.  - Cont Coreg and Lisinopril.  - Expeditious hospital discharge / rehab / outpatient follow-up (Dr. Shilo Kaur).    No further inpatient cardiac testing required.  Patient is not a good candidate for cath / possible revascularization given age and comorbidities / dementia.  BiV-PPM can be considered in future for LBBB if symptoms significant.    Discussed with House Staff and daughter (bedside).

## 2019-06-29 ENCOUNTER — OUTPATIENT (OUTPATIENT)
Dept: OUTPATIENT SERVICES | Facility: HOSPITAL | Age: 84
LOS: 1 days | Discharge: HOME | End: 2019-06-29

## 2019-06-29 DIAGNOSIS — I10 ESSENTIAL (PRIMARY) HYPERTENSION: ICD-10-CM

## 2019-06-29 DIAGNOSIS — Z98.890 OTHER SPECIFIED POSTPROCEDURAL STATES: Chronic | ICD-10-CM

## 2019-06-29 DIAGNOSIS — Z90.49 ACQUIRED ABSENCE OF OTHER SPECIFIED PARTS OF DIGESTIVE TRACT: Chronic | ICD-10-CM

## 2019-07-02 DIAGNOSIS — Z76.89 PERSONS ENCOUNTERING HEALTH SERVICES IN OTHER SPECIFIED CIRCUMSTANCES: ICD-10-CM

## 2019-07-05 ENCOUNTER — OUTPATIENT (OUTPATIENT)
Dept: OUTPATIENT SERVICES | Facility: HOSPITAL | Age: 84
LOS: 1 days | Discharge: HOME | End: 2019-07-05

## 2019-07-05 DIAGNOSIS — Z98.890 OTHER SPECIFIED POSTPROCEDURAL STATES: Chronic | ICD-10-CM

## 2019-07-05 DIAGNOSIS — R76.11 NONSPECIFIC REACTION TO TUBERCULIN SKIN TEST WITHOUT ACTIVE TUBERCULOSIS: ICD-10-CM

## 2019-07-05 DIAGNOSIS — Z90.49 ACQUIRED ABSENCE OF OTHER SPECIFIED PARTS OF DIGESTIVE TRACT: Chronic | ICD-10-CM

## 2019-07-10 DIAGNOSIS — R62.7 ADULT FAILURE TO THRIVE: ICD-10-CM

## 2019-07-10 DIAGNOSIS — F32.9 MAJOR DEPRESSIVE DISORDER, SINGLE EPISODE, UNSPECIFIED: ICD-10-CM

## 2019-07-10 DIAGNOSIS — I50.32 CHRONIC DIASTOLIC (CONGESTIVE) HEART FAILURE: ICD-10-CM

## 2019-07-10 DIAGNOSIS — E53.8 DEFICIENCY OF OTHER SPECIFIED B GROUP VITAMINS: ICD-10-CM

## 2019-07-10 DIAGNOSIS — F03.90 UNSPECIFIED DEMENTIA WITHOUT BEHAVIORAL DISTURBANCE: ICD-10-CM

## 2019-07-10 DIAGNOSIS — I11.0 HYPERTENSIVE HEART DISEASE WITH HEART FAILURE: ICD-10-CM

## 2019-07-10 DIAGNOSIS — J44.9 CHRONIC OBSTRUCTIVE PULMONARY DISEASE, UNSPECIFIED: ICD-10-CM

## 2019-07-10 DIAGNOSIS — I44.7 LEFT BUNDLE-BRANCH BLOCK, UNSPECIFIED: ICD-10-CM

## 2019-07-10 DIAGNOSIS — E66.01 MORBID (SEVERE) OBESITY DUE TO EXCESS CALORIES: ICD-10-CM

## 2019-07-10 DIAGNOSIS — I08.0 RHEUMATIC DISORDERS OF BOTH MITRAL AND AORTIC VALVES: ICD-10-CM

## 2019-07-10 DIAGNOSIS — N39.0 URINARY TRACT INFECTION, SITE NOT SPECIFIED: ICD-10-CM

## 2019-07-10 DIAGNOSIS — R06.02 SHORTNESS OF BREATH: ICD-10-CM

## 2019-07-10 DIAGNOSIS — E78.5 HYPERLIPIDEMIA, UNSPECIFIED: ICD-10-CM

## 2019-07-10 DIAGNOSIS — G47.33 OBSTRUCTIVE SLEEP APNEA (ADULT) (PEDIATRIC): ICD-10-CM

## 2019-09-18 ENCOUNTER — APPOINTMENT (OUTPATIENT)
Dept: CARDIOLOGY | Facility: CLINIC | Age: 84
End: 2019-09-18

## 2019-11-14 ENCOUNTER — APPOINTMENT (OUTPATIENT)
Dept: CARDIOLOGY | Facility: CLINIC | Age: 84
End: 2019-11-14

## 2019-11-26 ENCOUNTER — INPATIENT (INPATIENT)
Facility: HOSPITAL | Age: 84
LOS: 5 days | Discharge: NURSING, REHAB OR LONG-TERM CARE | End: 2019-12-02
Attending: INTERNAL MEDICINE | Admitting: INTERNAL MEDICINE
Payer: MEDICARE

## 2019-11-26 VITALS — TEMPERATURE: 100 F | OXYGEN SATURATION: 99 % | HEART RATE: 115 BPM

## 2019-11-26 DIAGNOSIS — Z98.890 OTHER SPECIFIED POSTPROCEDURAL STATES: Chronic | ICD-10-CM

## 2019-11-26 DIAGNOSIS — Z90.49 ACQUIRED ABSENCE OF OTHER SPECIFIED PARTS OF DIGESTIVE TRACT: Chronic | ICD-10-CM

## 2019-11-26 LAB
ALBUMIN SERPL ELPH-MCNC: 3.6 G/DL — SIGNIFICANT CHANGE UP (ref 3.5–5.2)
ALP SERPL-CCNC: 69 U/L — SIGNIFICANT CHANGE UP (ref 30–115)
ALT FLD-CCNC: 48 U/L — HIGH (ref 0–41)
ANION GAP SERPL CALC-SCNC: 12 MMOL/L — SIGNIFICANT CHANGE UP (ref 7–14)
APPEARANCE UR: CLEAR — SIGNIFICANT CHANGE UP
AST SERPL-CCNC: 57 U/L — HIGH (ref 0–41)
BACTERIA # UR AUTO: ABNORMAL
BASE EXCESS BLDV CALC-SCNC: -0.2 MMOL/L — SIGNIFICANT CHANGE UP (ref -2–2)
BASOPHILS # BLD AUTO: 0.1 K/UL — SIGNIFICANT CHANGE UP (ref 0–0.2)
BASOPHILS NFR BLD AUTO: 0.7 % — SIGNIFICANT CHANGE UP (ref 0–1)
BILIRUB SERPL-MCNC: 0.8 MG/DL — SIGNIFICANT CHANGE UP (ref 0.2–1.2)
BILIRUB UR-MCNC: NEGATIVE — SIGNIFICANT CHANGE UP
BUN SERPL-MCNC: 14 MG/DL — SIGNIFICANT CHANGE UP (ref 10–20)
CA-I SERPL-SCNC: 1.16 MMOL/L — SIGNIFICANT CHANGE UP (ref 1.12–1.3)
CALCIUM SERPL-MCNC: 8.8 MG/DL — SIGNIFICANT CHANGE UP (ref 8.5–10.1)
CHLORIDE SERPL-SCNC: 98 MMOL/L — SIGNIFICANT CHANGE UP (ref 98–110)
CO2 SERPL-SCNC: 26 MMOL/L — SIGNIFICANT CHANGE UP (ref 17–32)
COLOR SPEC: YELLOW — SIGNIFICANT CHANGE UP
CREAT SERPL-MCNC: 0.8 MG/DL — SIGNIFICANT CHANGE UP (ref 0.7–1.5)
DIFF PNL FLD: ABNORMAL
EOSINOPHIL # BLD AUTO: 0.24 K/UL — SIGNIFICANT CHANGE UP (ref 0–0.7)
EOSINOPHIL NFR BLD AUTO: 1.6 % — SIGNIFICANT CHANGE UP (ref 0–8)
EPI CELLS # UR: ABNORMAL /HPF
FLU A RESULT: NEGATIVE — SIGNIFICANT CHANGE UP
FLU A RESULT: NEGATIVE — SIGNIFICANT CHANGE UP
FLUAV AG NPH QL: NEGATIVE — SIGNIFICANT CHANGE UP
FLUBV AG NPH QL: NEGATIVE — SIGNIFICANT CHANGE UP
GAS PNL BLDV: 140 MMOL/L — SIGNIFICANT CHANGE UP (ref 136–145)
GAS PNL BLDV: SIGNIFICANT CHANGE UP
GLUCOSE SERPL-MCNC: 206 MG/DL — HIGH (ref 70–99)
GLUCOSE UR QL: NEGATIVE MG/DL — SIGNIFICANT CHANGE UP
HCO3 BLDV-SCNC: 28 MMOL/L — SIGNIFICANT CHANGE UP (ref 22–29)
HCT VFR BLD CALC: 46.4 % — SIGNIFICANT CHANGE UP (ref 37–47)
HCT VFR BLDA CALC: 47.8 % — HIGH (ref 34–44)
HGB BLD CALC-MCNC: 15.6 G/DL — SIGNIFICANT CHANGE UP (ref 14–18)
HGB BLD-MCNC: 14.5 G/DL — SIGNIFICANT CHANGE UP (ref 12–16)
HOROWITZ INDEX BLDV+IHG-RTO: 21 — SIGNIFICANT CHANGE UP
IMM GRANULOCYTES NFR BLD AUTO: 0.5 % — HIGH (ref 0.1–0.3)
KETONES UR-MCNC: NEGATIVE — SIGNIFICANT CHANGE UP
LACTATE BLDV-MCNC: 2.1 MMOL/L — HIGH (ref 0.5–1.6)
LEUKOCYTE ESTERASE UR-ACNC: NEGATIVE — SIGNIFICANT CHANGE UP
LYMPHOCYTES # BLD AUTO: 50.4 % — SIGNIFICANT CHANGE UP (ref 20.5–51.1)
LYMPHOCYTES # BLD AUTO: 7.71 K/UL — HIGH (ref 1.2–3.4)
MCHC RBC-ENTMCNC: 27.9 PG — SIGNIFICANT CHANGE UP (ref 27–31)
MCHC RBC-ENTMCNC: 31.3 G/DL — LOW (ref 32–37)
MCV RBC AUTO: 89.4 FL — SIGNIFICANT CHANGE UP (ref 81–99)
MONOCYTES # BLD AUTO: 0.85 K/UL — HIGH (ref 0.1–0.6)
MONOCYTES NFR BLD AUTO: 5.6 % — SIGNIFICANT CHANGE UP (ref 1.7–9.3)
NEUTROPHILS # BLD AUTO: 6.31 K/UL — SIGNIFICANT CHANGE UP (ref 1.4–6.5)
NEUTROPHILS NFR BLD AUTO: 41.2 % — LOW (ref 42.2–75.2)
NITRITE UR-MCNC: NEGATIVE — SIGNIFICANT CHANGE UP
NRBC # BLD: 0 /100 WBCS — SIGNIFICANT CHANGE UP (ref 0–0)
NT-PROBNP SERPL-SCNC: 2543 PG/ML — HIGH (ref 0–300)
PCO2 BLDV: 59 MMHG — HIGH (ref 41–51)
PH BLDV: 7.28 — SIGNIFICANT CHANGE UP (ref 7.26–7.43)
PH UR: 6.5 — SIGNIFICANT CHANGE UP (ref 5–8)
PLATELET # BLD AUTO: 161 K/UL — SIGNIFICANT CHANGE UP (ref 130–400)
PO2 BLDV: 35 MMHG — SIGNIFICANT CHANGE UP (ref 20–40)
POTASSIUM BLDV-SCNC: 5 MMOL/L — SIGNIFICANT CHANGE UP (ref 3.3–5.6)
POTASSIUM SERPL-MCNC: 5.3 MMOL/L — HIGH (ref 3.5–5)
POTASSIUM SERPL-SCNC: 5.3 MMOL/L — HIGH (ref 3.5–5)
PROT SERPL-MCNC: 8.5 G/DL — HIGH (ref 6–8)
PROT UR-MCNC: 30 MG/DL
RBC # BLD: 5.19 M/UL — SIGNIFICANT CHANGE UP (ref 4.2–5.4)
RBC # FLD: 14.8 % — HIGH (ref 11.5–14.5)
RBC CASTS # UR COMP ASSIST: ABNORMAL /HPF
RSV RESULT: NEGATIVE — SIGNIFICANT CHANGE UP
RSV RNA RESP QL NAA+PROBE: NEGATIVE — SIGNIFICANT CHANGE UP
SAO2 % BLDV: 59 % — SIGNIFICANT CHANGE UP
SODIUM SERPL-SCNC: 136 MMOL/L — SIGNIFICANT CHANGE UP (ref 135–146)
SP GR SPEC: 1.02 — SIGNIFICANT CHANGE UP (ref 1.01–1.03)
TROPONIN T SERPL-MCNC: <0.01 NG/ML — SIGNIFICANT CHANGE UP
UROBILINOGEN FLD QL: 0.2 MG/DL — SIGNIFICANT CHANGE UP (ref 0.2–0.2)
WBC # BLD: 15.29 K/UL — HIGH (ref 4.8–10.8)
WBC # FLD AUTO: 15.29 K/UL — HIGH (ref 4.8–10.8)
WBC UR QL: SIGNIFICANT CHANGE UP /HPF

## 2019-11-26 PROCEDURE — 71045 X-RAY EXAM CHEST 1 VIEW: CPT | Mod: 26

## 2019-11-26 PROCEDURE — 99291 CRITICAL CARE FIRST HOUR: CPT

## 2019-11-26 PROCEDURE — 99285 EMERGENCY DEPT VISIT HI MDM: CPT

## 2019-11-26 RX ORDER — ENOXAPARIN SODIUM 100 MG/ML
40 INJECTION SUBCUTANEOUS DAILY
Refills: 0 | Status: DISCONTINUED | OUTPATIENT
Start: 2019-11-26 | End: 2019-12-02

## 2019-11-26 RX ORDER — CARVEDILOL PHOSPHATE 80 MG/1
6.25 CAPSULE, EXTENDED RELEASE ORAL EVERY 12 HOURS
Refills: 0 | Status: DISCONTINUED | OUTPATIENT
Start: 2019-11-26 | End: 2019-12-02

## 2019-11-26 RX ORDER — SERTRALINE 25 MG/1
1 TABLET, FILM COATED ORAL
Qty: 0 | Refills: 0 | DISCHARGE

## 2019-11-26 RX ORDER — FUROSEMIDE 40 MG
40 TABLET ORAL DAILY
Refills: 0 | Status: DISCONTINUED | OUTPATIENT
Start: 2019-11-26 | End: 2019-11-28

## 2019-11-26 RX ORDER — LISINOPRIL 2.5 MG/1
1 TABLET ORAL
Qty: 0 | Refills: 0 | DISCHARGE

## 2019-11-26 RX ORDER — CEFTRIAXONE 500 MG/1
1000 INJECTION, POWDER, FOR SOLUTION INTRAMUSCULAR; INTRAVENOUS EVERY 24 HOURS
Refills: 0 | Status: DISCONTINUED | OUTPATIENT
Start: 2019-11-26 | End: 2019-11-27

## 2019-11-26 RX ORDER — AZITHROMYCIN 500 MG/1
500 TABLET, FILM COATED ORAL EVERY 24 HOURS
Refills: 0 | Status: DISCONTINUED | OUTPATIENT
Start: 2019-11-26 | End: 2019-11-27

## 2019-11-26 RX ORDER — AZITHROMYCIN 500 MG/1
500 TABLET, FILM COATED ORAL ONCE
Refills: 0 | Status: COMPLETED | OUTPATIENT
Start: 2019-11-26 | End: 2019-11-26

## 2019-11-26 RX ORDER — ASPIRIN/CALCIUM CARB/MAGNESIUM 324 MG
81 TABLET ORAL DAILY
Refills: 0 | Status: DISCONTINUED | OUTPATIENT
Start: 2019-11-26 | End: 2019-12-02

## 2019-11-26 RX ORDER — PANTOPRAZOLE SODIUM 20 MG/1
40 TABLET, DELAYED RELEASE ORAL
Refills: 0 | Status: DISCONTINUED | OUTPATIENT
Start: 2019-11-26 | End: 2019-12-02

## 2019-11-26 RX ORDER — CEFTRIAXONE 500 MG/1
1000 INJECTION, POWDER, FOR SOLUTION INTRAMUSCULAR; INTRAVENOUS ONCE
Refills: 0 | Status: COMPLETED | OUTPATIENT
Start: 2019-11-26 | End: 2019-11-26

## 2019-11-26 RX ORDER — LANOLIN ALCOHOL/MO/W.PET/CERES
5 CREAM (GRAM) TOPICAL ONCE
Refills: 0 | Status: COMPLETED | OUTPATIENT
Start: 2019-11-26 | End: 2019-11-26

## 2019-11-26 RX ORDER — IPRATROPIUM/ALBUTEROL SULFATE 18-103MCG
3 AEROSOL WITH ADAPTER (GRAM) INHALATION EVERY 6 HOURS
Refills: 0 | Status: DISCONTINUED | OUTPATIENT
Start: 2019-11-26 | End: 2019-12-02

## 2019-11-26 RX ORDER — CHLORHEXIDINE GLUCONATE 213 G/1000ML
1 SOLUTION TOPICAL
Refills: 0 | Status: DISCONTINUED | OUTPATIENT
Start: 2019-11-26 | End: 2019-12-02

## 2019-11-26 RX ORDER — SERTRALINE 25 MG/1
100 TABLET, FILM COATED ORAL DAILY
Refills: 0 | Status: DISCONTINUED | OUTPATIENT
Start: 2019-11-26 | End: 2019-12-02

## 2019-11-26 RX ORDER — FUROSEMIDE 40 MG
40 TABLET ORAL ONCE
Refills: 0 | Status: COMPLETED | OUTPATIENT
Start: 2019-11-26 | End: 2019-11-26

## 2019-11-26 RX ORDER — CARVEDILOL PHOSPHATE 80 MG/1
1 CAPSULE, EXTENDED RELEASE ORAL
Qty: 0 | Refills: 0 | DISCHARGE

## 2019-11-26 RX ORDER — TIOTROPIUM BROMIDE 18 UG/1
1 CAPSULE ORAL; RESPIRATORY (INHALATION)
Qty: 0 | Refills: 0 | DISCHARGE

## 2019-11-26 RX ADMIN — Medication 40 MILLIGRAM(S): at 18:16

## 2019-11-26 RX ADMIN — Medication 5 MILLIGRAM(S): at 23:36

## 2019-11-26 RX ADMIN — AZITHROMYCIN 255 MILLIGRAM(S): 500 TABLET, FILM COATED ORAL at 20:22

## 2019-11-26 RX ADMIN — CEFTRIAXONE 100 MILLIGRAM(S): 500 INJECTION, POWDER, FOR SOLUTION INTRAMUSCULAR; INTRAVENOUS at 20:22

## 2019-11-26 NOTE — H&P ADULT - ATTENDING COMMENTS
Pt seen w PA and agree w above.  Sob likely due to CHF precipitated by possible pneumonia v copd.  Pt improved after lasix and with Bipap.  Continue carvedilol and losartan as well as lasix IV.  Will treat for CAP w rocephin and azithromycin and reassess.  Obtain 2d echo,, cardio eval. Admit as Hard tele.

## 2019-11-26 NOTE — H&P ADULT - HISTORY OF PRESENT ILLNESS
83yo Female w/ PMH of CHF, COPD not on home O2, HLD, dementia BIBA w/ CC of SOB that started today. + clear sputum production. In ED, pt was in resp distress. BNP 2000s. CXR shows b/l pleural effusion and vascular congestion. IV lasix given and BIPAP applied to pt w/ great relief. Pt denies other physical complaints. Pt denies fever/chills, CP, palpitation, abd pain, N/V/D, dysuria, swelling, change in mental status.

## 2019-11-26 NOTE — ED PROVIDER NOTE - PHYSICAL EXAMINATION
Physical Exam    Vital Signs: I have reviewed the initial vital signs.  Constitutional: well-nourished, appears stated age, no acute distress  Eyes: Conjunctiva pink, Sclera clear,   Cardiovascular: S1 and S2, regular rate, regular rhythm, well-perfused extremities, radial pulses equal and 2+  Respiratory: labored respiratory effort, b/l rales  Gastrointestinal: soft, non-tender abdomen, no pulsatile mass, normal bowl sounds  Musculoskeletal: supple neck, no lower extremity edema, no midline tenderness  Integumentary: warm, dry, no rash  Neurologic: awake, alert, nvi, extremities’ motor and sensory functions grossly intact

## 2019-11-26 NOTE — PATIENT PROFILE ADULT - ABILITY TO HEAR (WITH HEARING AID OR HEARING APPLIANCE IF NORMALLY USED):
pt [place]/Mildly to Moderately Impaired: difficulty hearing in some environments or speaker may need to increase volume or speak distinctly

## 2019-11-26 NOTE — ED PROVIDER NOTE - CHPI ED SYMPTOMS NEG
no body aches/no edema/no chills/no headache/no hemoptysis/no diaphoresis/no wheezing/no chest pain/no cough/no fever

## 2019-11-26 NOTE — H&P ADULT - ASSESSMENT
85yo Female w/ PMH of CHF, COPD not on home O2, HLD, dementia BIBA w/ CC of SOB that started today. + clear sputum production. In ED, pt was in resp distress. BNP 2000s. CXR shows b/l pleural effusion and vascular congestion. IV lasix given and BIPAP applied to pt w/ great relief.     Discussed w/ Dr. Jimenez. Admit to CCU tele      CHF exacerbation  - BNP 2000s. CXR shows b/l pleural effusion and vascular congestion.  - Pt on BIPAP w/ great improvement. s/p IV lasix 40mg in ED.  - c/w BIPAP for now  - IV lasix 40mg daily  - Strict I&Os. Keep I < O  - NPO for now  - 2D echo, CXR, EKG  - initial trop negative. serial cardiac enzymes  - cardiology consult    HTN  - c/w home meds    COPD  - duonebs PRN      DVT prophylaxis: lovenox  GI prophylaxis: PPI  Diet: NPO 85yo Female w/ PMH of CHF, COPD not on home O2, HLD, dementia BIBA w/ CC of SOB that started today. + clear sputum production. In ED, pt was in resp distress. BNP 2000s. CXR shows b/l pleural effusion and vascular congestion. IV lasix given and BIPAP applied to pt w/ great relief.     Discussed w/ Dr. Jimenez. Admit to CCU tele      CHF exacerbation  - BNP 2000s. CXR shows b/l pleural effusion and vascular congestion.  - Pt on BIPAP w/ great improvement. s/p IV lasix 40mg in ED.  - c/w BIPAP for now  - IV lasix 40mg daily  - Strict I&Os. Keep I < O  - NPO for now  - 2D echo, CXR, EKG  - initial trop negative. serial cardiac enzymes  - cardiology consult    Leukocytosis, possibly 2/2 PNA  - low grade fever noted in ED  - s/p IV abx in ED  - PAN culture  - c/w IV abx for now  - f/u cultures    HTN  - c/w home meds    COPD  - duonebs PRN      DVT prophylaxis: lovenox  GI prophylaxis: PPI  Diet: NPO

## 2019-11-26 NOTE — H&P ADULT - BIRTH SEX
Last office visit 6/28/2019     Next office visit scheduled None scheduled.      Requested Prescriptions     Pending Prescriptions Disp Refills    levothyroxine (LEVOXYL) 75 MCG tablet 90 tablet 1     Sig: Take 2.5 tablets by mouth daily
Female

## 2019-11-26 NOTE — ED PROVIDER NOTE - CLINICAL SUMMARY MEDICAL DECISION MAKING FREE TEXT BOX
Pt with resp distress, improved after treatment by EMS and ED treatement.  Pt with low grade temp with leukocytosis,  + fluid on x ray, ? opacity.  abx given and cultures sent.  Case d/w intensivist.  will admit to tele.

## 2019-11-26 NOTE — ED PROVIDER NOTE - OBJECTIVE STATEMENT
85 yo female, pmh of chf, copd, htn, hld, dementia, presents to ed for sob. As per ems, pt had acute resp distress tonight, trouble moving air, pt received two sublinguial nitro in field and placed on cpap. At this time pt denies fever, chills, cp, leonard swelling, back pain, neck pain, falls, abd pain, nvd, dizziness, ha. Pts daughter states lasix held for few weeks.

## 2019-11-26 NOTE — ED PROVIDER NOTE - PROGRESS NOTE DETAILS
note does not reflectr real time event: Pt note pta, bp in field was 200s spb, 2 nitro in field, here on bipap improved meental status and resp effort. Pt approved to ccu tele for admission by alana sequeira

## 2019-11-26 NOTE — ED PROVIDER NOTE - ATTENDING CONTRIBUTION TO CARE
85 yo F PMHx noted including h/o HTN, COPD, CHF presents as medical notification from home accompanied by family with c/o SOB today.  Per EMS patient found in resp distress with O2 sat 80s and elevated BP.  Pt was treated with 2 SLNTG and placed on bipap with improvement.  no CP.  On exam pt in some distress with increased WOB on bipap.  Pt with crackles b/l, abd soft nt nd, no edmea,

## 2019-11-26 NOTE — PATIENT PROFILE ADULT - VISION (WITH CORRECTIVE LENSES IF THE PATIENT USUALLY WEARS THEM):
Partially impaired: cannot see medication labels or newsprint, but can see obstacles in path, and the surrounding layout; can count fingers at arm's length/pt wears reading glasses

## 2019-11-26 NOTE — ED PROVIDER NOTE - CARE PLAN
Principal Discharge DX:	Fluid overload  Secondary Diagnosis:	Respiratory failure  Secondary Diagnosis:	Leukocytosis  Secondary Diagnosis:	Pneumonia

## 2019-11-26 NOTE — ED PROVIDER NOTE - NS ED ROS FT
Constitutional: (-) fever, (-) chills  Eyes: (-) visual changes  ENT: (-) nasal congestions  Cardiovascular: (-) chest pain, (-) syncope  Respiratory: (-) cough, (+) shortness of breath, (-) dyspnea,   Gastrointestinal: (-) vomiting, (-) diarrhea, (-)nausea,  Musculoskeletal: (-) neck pain, (-) back pain, (-) joint pain,  Integumentary: (-) rash, (-) edema, (-) bruises  Neurological: (-) headache, (-) loc, (-) dizziness, (-) tingling, (-)numbness,  Peripheral Vascular: (-) leg swelling  :  (-)dysuria,  (-) hematuria  Allergic/Immunologic: (-) pruritus

## 2019-11-26 NOTE — H&P ADULT - NSHPPHYSICALEXAM_GEN_ALL_CORE
ICU Vital Signs Last 24 Hrs  T(C): 37.8 (26 Nov 2019 17:50), Max: 37.8 (26 Nov 2019 17:45)  T(F): 100.1 (26 Nov 2019 17:50), Max: 100.1 (26 Nov 2019 17:45)  HR: 92 (26 Nov 2019 19:04) (92 - 115)  BP: 175/61 (26 Nov 2019 19:04) (136/64 - 175/61)  BP(mean): --  ABP: --  ABP(mean): --  RR: 27 (26 Nov 2019 18:18) (27 - 33)  SpO2: 98% (26 Nov 2019 19:04) (98% - 99%)        Physical Examination:    General: On BIPAP, feeling improved.  Alert, oriented, interactive, nonfocal    HEENT: Pupils equal, reactive to light.  Symmetric.    PULM: + b/l rales     CVS: Regular rate and rhythm, no murmurs, rubs, or gallops    ABD: Soft, nondistended, nontender, normoactive bowel sounds, no masses    EXT: No edema, nontender    SKIN: Warm and well perfused, no rashes noted.

## 2019-11-26 NOTE — ED PROVIDER NOTE - CRITICAL CARE PROVIDED
consultation with other physicians/direct patient care (not related to procedure)/consult w/ pt's family directly relating to pts condition/telephone consultation with the patient's family interpretation of diagnostic studies/consult w/ pt's family directly relating to pts condition/additional history taking/consultation with other physicians/direct patient care (not related to procedure)/documentation

## 2019-11-26 NOTE — ED ADULT NURSE NOTE - NSIMPLEMENTINTERV_GEN_ALL_ED
Implemented All Fall with Harm Risk Interventions:  [place] to call system. Call bell, personal items and telephone within reach. Instruct patient to call for assistance. Room bathroom lighting operational. Non-slip footwear when patient is off stretcher. Physically safe environment: no spills, clutter or unnecessary equipment. Stretcher in lowest position, wheels locked, appropriate side rails in place. Provide visual cue, wrist band, yellow gown, etc. Monitor gait and stability. Monitor for mental status changes and reorient to person, place, and time. Review medications for side effects contributing to fall risk. Reinforce activity limits and safety measures with patient and family. Provide visual clues: red socks.

## 2019-11-26 NOTE — H&P ADULT - NSHPLABSRESULTS_GEN_ALL_CORE
I&O's Detail        LABS:                        14.5   15.29 )-----------( 161      ( 2019 18:00 )             46.4     2019 18:00    136    |  98     |  14     ----------------------------<  206    5.3     |  26     |  0.8      Ca    8.8        2019 18:00    TPro  8.5    /  Alb  3.6    /  TBili  0.8    /  DBili  x      /  AST  57     /  ALT  48     /  AlkPhos  69     2019 18:00  Amylase x     lipase x          CARDIAC MARKERS ( 2019 18:00 )  x     / <0.01 ng/mL / x     / x     / x          CAPILLARY BLOOD GLUCOSE          Urinalysis Basic - ( 2019 19:00 )    Color: Yellow / Appearance: Clear / S.020 / pH: x  Gluc: x / Ketone: Negative  / Bili: Negative / Urobili: 0.2 mg/dL   Blood: x / Protein: 30 mg/dL / Nitrite: Negative   Leuk Esterase: Negative / RBC: 3-5 /HPF / WBC 1-2 /HPF   Sq Epi: x / Non Sq Epi: Moderate /HPF / Bacteria: Moderate      Culture        MEDICATIONS  (STANDING):  aspirin  chewable 81 milliGRAM(s) Oral daily  carvedilol 6.25 milliGRAM(s) Oral every 12 hours  chlorhexidine 4% Liquid 1 Application(s) Topical two times a day  enoxaparin Injectable 40 milliGRAM(s) SubCutaneous daily  pantoprazole    Tablet 40 milliGRAM(s) Oral before breakfast  sertraline 100 milliGRAM(s) Oral daily    MEDICATIONS  (PRN):        RADIOLOGY:

## 2019-11-27 LAB
ANION GAP SERPL CALC-SCNC: 15 MMOL/L — HIGH (ref 7–14)
BASOPHILS # BLD AUTO: 0.05 K/UL — SIGNIFICANT CHANGE UP (ref 0–0.2)
BASOPHILS NFR BLD AUTO: 0.4 % — SIGNIFICANT CHANGE UP (ref 0–1)
BUN SERPL-MCNC: 16 MG/DL — SIGNIFICANT CHANGE UP (ref 10–20)
CALCIUM SERPL-MCNC: 8.6 MG/DL — SIGNIFICANT CHANGE UP (ref 8.5–10.1)
CHLORIDE SERPL-SCNC: 97 MMOL/L — LOW (ref 98–110)
CO2 SERPL-SCNC: 24 MMOL/L — SIGNIFICANT CHANGE UP (ref 17–32)
CREAT SERPL-MCNC: 0.8 MG/DL — SIGNIFICANT CHANGE UP (ref 0.7–1.5)
EOSINOPHIL # BLD AUTO: 0.12 K/UL — SIGNIFICANT CHANGE UP (ref 0–0.7)
EOSINOPHIL NFR BLD AUTO: 1 % — SIGNIFICANT CHANGE UP (ref 0–8)
GLUCOSE SERPL-MCNC: 97 MG/DL — SIGNIFICANT CHANGE UP (ref 70–99)
HCT VFR BLD CALC: 40.4 % — SIGNIFICANT CHANGE UP (ref 37–47)
HGB BLD-MCNC: 13.1 G/DL — SIGNIFICANT CHANGE UP (ref 12–16)
IMM GRANULOCYTES NFR BLD AUTO: 0.4 % — HIGH (ref 0.1–0.3)
LYMPHOCYTES # BLD AUTO: 35.7 % — SIGNIFICANT CHANGE UP (ref 20.5–51.1)
LYMPHOCYTES # BLD AUTO: 4.27 K/UL — HIGH (ref 1.2–3.4)
MAGNESIUM SERPL-MCNC: 1.9 MG/DL — SIGNIFICANT CHANGE UP (ref 1.8–2.4)
MCHC RBC-ENTMCNC: 28.4 PG — SIGNIFICANT CHANGE UP (ref 27–31)
MCHC RBC-ENTMCNC: 32.4 G/DL — SIGNIFICANT CHANGE UP (ref 32–37)
MCV RBC AUTO: 87.4 FL — SIGNIFICANT CHANGE UP (ref 81–99)
MONOCYTES # BLD AUTO: 1.05 K/UL — HIGH (ref 0.1–0.6)
MONOCYTES NFR BLD AUTO: 8.8 % — SIGNIFICANT CHANGE UP (ref 1.7–9.3)
NEUTROPHILS # BLD AUTO: 6.43 K/UL — SIGNIFICANT CHANGE UP (ref 1.4–6.5)
NEUTROPHILS NFR BLD AUTO: 53.7 % — SIGNIFICANT CHANGE UP (ref 42.2–75.2)
NRBC # BLD: 0 /100 WBCS — SIGNIFICANT CHANGE UP (ref 0–0)
PHOSPHATE SERPL-MCNC: 3.7 MG/DL — SIGNIFICANT CHANGE UP (ref 2.1–4.9)
PLATELET # BLD AUTO: 99 K/UL — LOW (ref 130–400)
POTASSIUM SERPL-MCNC: 4.2 MMOL/L — SIGNIFICANT CHANGE UP (ref 3.5–5)
POTASSIUM SERPL-SCNC: 4.2 MMOL/L — SIGNIFICANT CHANGE UP (ref 3.5–5)
RBC # BLD: 4.62 M/UL — SIGNIFICANT CHANGE UP (ref 4.2–5.4)
RBC # FLD: 14.7 % — HIGH (ref 11.5–14.5)
SODIUM SERPL-SCNC: 136 MMOL/L — SIGNIFICANT CHANGE UP (ref 135–146)
TROPONIN T SERPL-MCNC: 0.1 NG/ML — CRITICAL HIGH
TROPONIN T SERPL-MCNC: 0.11 NG/ML — CRITICAL HIGH
WBC # BLD: 11.97 K/UL — HIGH (ref 4.8–10.8)
WBC # FLD AUTO: 11.97 K/UL — HIGH (ref 4.8–10.8)

## 2019-11-27 PROCEDURE — 71045 X-RAY EXAM CHEST 1 VIEW: CPT | Mod: 26

## 2019-11-27 PROCEDURE — 99233 SBSQ HOSP IP/OBS HIGH 50: CPT | Mod: 25

## 2019-11-27 RX ORDER — LANOLIN ALCOHOL/MO/W.PET/CERES
5 CREAM (GRAM) TOPICAL AT BEDTIME
Refills: 0 | Status: DISCONTINUED | OUTPATIENT
Start: 2019-11-27 | End: 2019-12-02

## 2019-11-27 RX ADMIN — CHLORHEXIDINE GLUCONATE 1 APPLICATION(S): 213 SOLUTION TOPICAL at 05:33

## 2019-11-27 RX ADMIN — Medication 81 MILLIGRAM(S): at 11:46

## 2019-11-27 RX ADMIN — PANTOPRAZOLE SODIUM 40 MILLIGRAM(S): 20 TABLET, DELAYED RELEASE ORAL at 05:34

## 2019-11-27 RX ADMIN — ENOXAPARIN SODIUM 40 MILLIGRAM(S): 100 INJECTION SUBCUTANEOUS at 11:46

## 2019-11-27 RX ADMIN — SERTRALINE 100 MILLIGRAM(S): 25 TABLET, FILM COATED ORAL at 11:46

## 2019-11-27 RX ADMIN — Medication 1 TABLET(S): at 17:34

## 2019-11-27 RX ADMIN — Medication 5 MILLIGRAM(S): at 22:03

## 2019-11-27 RX ADMIN — CARVEDILOL PHOSPHATE 6.25 MILLIGRAM(S): 80 CAPSULE, EXTENDED RELEASE ORAL at 05:33

## 2019-11-27 RX ADMIN — CARVEDILOL PHOSPHATE 6.25 MILLIGRAM(S): 80 CAPSULE, EXTENDED RELEASE ORAL at 17:34

## 2019-11-27 RX ADMIN — Medication 40 MILLIGRAM(S): at 05:33

## 2019-11-27 RX ADMIN — CHLORHEXIDINE GLUCONATE 1 APPLICATION(S): 213 SOLUTION TOPICAL at 17:03

## 2019-11-27 NOTE — CONSULT NOTE ADULT - CONSULT REQUESTED BY NAME
Received pt to araujo chair, resting in position of comfort; pt reports otf lower back pain since Sunday, denies trauma or incontinence, pain is worse with bending medicine

## 2019-11-27 NOTE — CONSULT NOTE ADULT - ASSESSMENT
IMPRESSION:    Acute on cxnlmf2a HFrEF   Possible CAP   COPD - no exacerbation   Chronic HCRF  Dementia     PLAN:    CNS: Avoid sedatives     HEENT: Oral care    PULMONARY:  HOB @ 45 degrees, Augmentin x 5 days, prn bipap     CARDIOVASCULAR: Keep map > 65, Diurese 40 IV daily , coreg and asa, monitor prolonged qtc     GI: GI prophylaxis.  Feeding     RENAL:  Follow up lytes.  Correct as needed, strict I/O     INFECTIOUS DISEASE: Follow up cultures    HEMATOLOGICAL:  DVT prophylaxis.    ENDOCRINE:  Follow up FS.  Insulin protocol if needed.    MUSCULOSKELETAL: OOB to chair     Lines: peripherals   [place]: Primaphed   Code status: Full   Prognosis: Guarded   Dispo: Tele IMPRESSION:    Acute on chronic HFrEF   Possible CAP   COPD - no exacerbation   Chronic HCRF  Dementia     SUGGEST:      CNS: Avoid sedatives     HEENT: Oral care    PULMONARY:  HOB @ 45 degrees, Augmentin x 5 days, prn bipap     CARDIOVASCULAR: Keep map > 65,   Diurese 40 IV daily ,   coreg and asa,   monitor prolonged qtc     GI: GI prophylaxis.  Feeding     RENAL:  Follow up lytes.  Correct as needed, strict I/O     INFECTIOUS DISEASE: Follow up cultures    HEMATOLOGICAL:  DVT prophylaxis.    ENDOCRINE:  Follow up FS.  Insulin protocol if needed.    MUSCULOSKELETAL: OOB to chair     Lines: peripherals   [place]: Primaphed   Code status: Full   Prognosis: Guarded   Dispo: Tele

## 2019-11-27 NOTE — PHYSICAL THERAPY INITIAL EVALUATION ADULT - GENERAL OBSERVATIONS, REHAB EVAL
15:34-15;56 Chart reviewed. Pt encountered semireclined in bed, may be seen by Physical Therapist as confirmed with Nurse. Patient denied pain at rest and ready to walk ; +tele; +O2 via NC; +Primafit

## 2019-11-27 NOTE — CONSULT NOTE ADULT - SUBJECTIVE AND OBJECTIVE BOX
HPI:84 y o Female w/ PMH of CHF, COPD not on home O2, HLD, dementia BIBA w/ CC of SOB that started today. + clear sputum production. In ED, pt was in resp distress. BNP . CXR shows b/l pleural effusion and vascular congestion. IV lasix given and BIPAP applied to pt w/ great relief. Pt denies other physical complaints. Pt denies fever/chills, CP, palpitation, abd pain, N/V/D, dysuria, swelling, change in mental status. ptn  dtr at  the  bed  side  give hx  and  agree  with  rehab  plan  .    PTN  REFERRED TO ACUTE  REHAB  FOR  EVAL AND  TX   PAST MEDICAL & SURGICAL HISTORY:  Obstructive sleep apnea  Presenile dementia with depression  History of left bundle branch block  Metabolic encephalopathy  Urinary tract infection  Dementia  Hyperlipidemia  Hypertension  COPD (chronic obstructive pulmonary disease)  Congestive heart failure  S/P removal of ovarian cyst  H/O lumpectomy  History of appendectomy      Hospital Course:    TODAY'S SUBJECTIVE & REVIEW OF SYMPTOMS:     Constitutional WNL   Cardio WNL   Resp WNL   GI WNL  Heme WNL  Endo WNL  Skin WNL  MSK WNL  Neuro WNL  Cognitive WNL  Psych WNL      MEDICATIONS  (STANDING):  amoxicillin  875 milliGRAM(s)/clavulanate 1 Tablet(s) Oral two times a day  aspirin  chewable 81 milliGRAM(s) Oral daily  carvedilol 6.25 milliGRAM(s) Oral every 12 hours  chlorhexidine 4% Liquid 1 Application(s) Topical two times a day  enoxaparin Injectable 40 milliGRAM(s) SubCutaneous daily  furosemide   Injectable 40 milliGRAM(s) IV Push daily  pantoprazole    Tablet 40 milliGRAM(s) Oral before breakfast  sertraline 100 milliGRAM(s) Oral daily    MEDICATIONS  (PRN):  albuterol/ipratropium for Nebulization 3 milliLiter(s) Nebulizer every 6 hours PRN Shortness of Breath and/or Wheezing      FAMILY HISTORY:  FHx: lung cancer: father      Allergies    nitrofurantoin (Short breath; Hives)    Intolerances        SOCIAL HISTORY:    [  ] Etoh  [  ] Smoking  [  ] Substance abuse     Home Environment:  [ x ] Home Alone  [  ] Lives with Family  [  ] Home Health Aid    Dwelling:  [ x ] Apartment  [ x ] Private House  [  ] Adult Home  [  ] Skilled Nursing Facility      [  ] Short Term  [  ] Long Term  [  x] Stairs       Elevator [  ]    FUNCTIONAL STATUS PTA: (Check all that apply)  Ambulation: [  x ]Independent    [ x ] Dependent     [  ] Non-Ambulatory  Assistive Device: [ x ] SA Cane  [  ]  Q Cane  [x  ] Walker  [  ]  Wheelchair  ADL : [ x ] Independent  [ x ]  Dependent       Vital Signs Last 24 Hrs  T(C): 35.6 (2019 15:00), Max: 37.6 (2019 22:04)  T(F): 96 (2019 15:00), Max: 99.6 (2019 22:04)  HR: 74 (2019 15:00) (74 - 105)  BP: 130/56 (2019 15:00) (120/58 - 175/61)  BP(mean): 81 (2019 15:00) (81 - 90)  RR: 19 (2019 15:00) (19 - 29)  SpO2: 98% (2019 15:00) (89% - 98%)      PHYSICAL EXAM: Alert & Oriented X 1  GENERAL: NAD, well-groomed, well-developed  HEAD:  Atraumatic, Normocephalic  EYES: EOMI, PERRLA, conjunctiva and sclera clear  NECK: Supple, No JVD, Normal thyroid  CHEST/LUNG: Clear to percussion bilaterally; No rales, rhonchi, wheezing, or rubs  HEART: Regular rate and rhythm; No murmurs, rubs, or gallops  ABDOMEN: Soft, Nontender, Nondistended; Bowel sounds present  EXTREMITIES:  2+ Peripheral Pulses, No clubbing, cyanosis, or edema    NERVOUS SYSTEM:  Cranial Nerves 2-12 intact [x  ] Abnormal  [  ]  ROM: WFL all extremities [  ]  Abnormal [x  ]  Motor Strength: WFL all extremities  [  ]  Abnormal [ x ]  Sensation: intact to light touch [  ] Abnormal [x  ]  Reflexes: Symmetric [  ]  Abnormal [ x ]    FUNCTIONAL STATUS:  Bed Mobility: Independent [  ]  Supervision [  ]  Needs Assistance [ x ]  N/A [  ]  Transfers: Independent [  ]  Supervision [  ]  Needs Assistance [ x ]  N/A [  ]   Ambulation: Independent [  ]  Supervision [  ]  Needs Assistance [ x ]  N/A [  ]  ADL: Independent [  ] Requires Assistance [  ] N/A [ x ]  SEE PT/OT IE NOTES    LABS:                        13.1   1197 )-----------( 99       ( 2019 05:40 )             40.4         136  |  97<L>  |  16  ----------------------------<  97  4.2   |  24  |  0.8    Ca    8.6      2019 05:40  Phos  3.7       Mg     1.9         TPro  8.5<H>  /  Alb  3.6  /  TBili  0.8  /  DBili  x   /  AST  57<H>  /  ALT  48<H>  /  AlkPhos  69        Urinalysis Basic - ( 2019 19:00 )    Color: Yellow / Appearance: Clear / S.020 / pH: x  Gluc: x / Ketone: Negative  / Bili: Negative / Urobili: 0.2 mg/dL   Blood: x / Protein: 30 mg/dL / Nitrite: Negative   Leuk Esterase: Negative / RBC: 3-5 /HPF / WBC 1-2 /HPF   Sq Epi: x / Non Sq Epi: Moderate /HPF / Bacteria: Moderate        RADIOLOGY & ADDITIONAL STUDIES:    Assesment:

## 2019-11-27 NOTE — CONSULT NOTE ADULT - ATTENDING COMMENTS
Attending Statement: I have personally performed a face to face diagnostic evaluation on this patient. The patient is suffering from pulmonary edema I have reviewed the above note and agree with the history, exam and plan of care, except as I have noted in the text.

## 2019-11-27 NOTE — CONSULT NOTE ADULT - ASSESSMENT
Patient chf many years ago. Now advanced dementia. Lasix stopped by pmd for months. She got peripheral edema. Lasix resumed 1 week. Not eating . Told can eat anything. So eating potato chips high salt food. Presented chf. Probaly secondary to diet. Now better/. Medical rx. Beta lasix. Not able tolerate statin . Asa. Will need placement secondary dementia. Prognosis poor

## 2019-11-27 NOTE — CONSULT NOTE ADULT - ASSESSMENT
IMPRESSION: Rehab of 83 y/o  f rehab  for  debility  gd      PRECAUTIONS: [ x ] Cardiac  [ x ] Respiratory  [  ] Seizures [  ] Contact Isolation  [  ] Droplet Isolation  [ FALL ] Other    Weight Bearing Status:     RECOMMENDATION:    Out of Bed to Chair     DVT/Decubiti Prophylaxis    REHAB PLAN:     [  xx ] Bedside P/T 3-5 times a week   [   ]   Bedside O/T  2-3 times a week             [   ] No Rehab Therapy Indicated                   [   ]  Speech Therapy   Conditioning/ROM                                    ADL  Bed Mobility                                               Conditioning/ROM  Transfers                                                     Bed Mobility  Sitting /Standing Balance                         Transfers                                        Gait Training                                               Sitting/Standing Balance  Stair Training [   ]Applicable                    Home equipment Eval                                                                        Splinting  [   ] Only      GOALS:   ADL   [x   ]   Independent                    Transfers  [  x ] Independent                          Ambulation  [  x ] Independent     [   x ] With device                            [ x  ]  CG                                                         [   x]  CG                                                                  [   x] CG                            [    ] Min A                                                   [   ] Min A                                                              [   ] Min  A          DISCHARGE PLAN:   [   ]  Good candidate for Intensive Rehabilitation/Hospital based-[facility]A [facility]                                             Will tolerate 3hrs Intensive Rehab Daily                                       [ x   x]  Short Term Rehab in Skilled Nursing Facility ptn  may  need  LTC  vs  24 hr  care  for  safety                                         [    ]  Home with Outpatient or VN services                                         [    ]  Possible Candidate for Intensive Hospital based Rehab

## 2019-11-27 NOTE — PROGRESS NOTE ADULT - SUBJECTIVE AND OBJECTIVE BOX
Patient is a 84y old  Female who presents with a chief complaint of CHF exacerbation       Interval events:  Feels much better today    PAST MEDICAL & SURGICAL HISTORY:  Obstructive sleep apnea  Presenile dementia with depression  History of left bundle branch block  Metabolic encephalopathy  Urinary tract infection  Dementia  Hyperlipidemia  Hypertension  COPD (chronic obstructive pulmonary disease)  Congestive heart failure  S/P removal of ovarian cyst  H/O lumpectomy  History of appendectomy      MEDICATIONS  (STANDING):  amoxicillin  875 milliGRAM(s)/clavulanate 1 Tablet(s) Oral two times a day  aspirin  chewable 81 milliGRAM(s) Oral daily  carvedilol 6.25 milliGRAM(s) Oral every 12 hours  chlorhexidine 4% Liquid 1 Application(s) Topical two times a day  enoxaparin Injectable 40 milliGRAM(s) SubCutaneous daily  furosemide   Injectable 40 milliGRAM(s) IV Push daily  pantoprazole    Tablet 40 milliGRAM(s) Oral before breakfast  sertraline 100 milliGRAM(s) Oral daily    MEDICATIONS  (PRN):  albuterol/ipratropium for Nebulization 3 milliLiter(s) Nebulizer every 6 hours PRN Shortness of Breath and/or Wheezing          Vital Signs Last 24 Hrs  T(C): 36 (2019 07:30), Max: 37.8 (2019 17:45)  T(F): 96.8 (2019 07:30), Max: 100.1 (2019 17:45)  HR: 83 (2019 07:30) (74 - 115)  BP: 120/58 (2019 07:30) (120/58 - 175/61)  BP(mean): 83 (2019 07:08) (83 - 90)  RR: 26 (2019 10:27) (20 - 33)  SpO2: 89% (2019 10:27) (89% - 99%)  CAPILLARY BLOOD GLUCOSE        I&O's Summary    2019 07:01  -  2019 07:00  --------------------------------------------------------  IN: 0 mL / OUT: 400 mL / NET: -400 mL        Physical Exam:    -     General : Lying in bed    -      Cardiac: Regular rate and rhythm    -      Pulm: minimal     -      GI:    -      Musculoskeletal:    -      Neuro:        Labs:                        13.1   11 )-----------( 99       ( 2019 05:40 )             40.4                 136  |  97<L>  |  16  ----------------------------<  97  4.2   |  24  |  0.8    Ca    8.6      2019 05:40  Phos  3.7       Mg     1.9         TPro  8.5<H>  /  Alb  3.6  /  TBili  0.8  /  DBili  x   /  AST  57<H>  /  ALT  48<H>  /  AlkPhos  69      LIVER FUNCTIONS - ( 2019 18:00 )  Alb: 3.6 g/dL / Pro: 8.5 g/dL / ALK PHOS: 69 U/L / ALT: 48 U/L / AST: 57 U/L / GGT: x                   CARDIAC MARKERS ( 2019 05:40 )  x     / 0.11 ng/mL / x     / x     / x      CARDIAC MARKERS ( 2019 18:00 )  x     / <0.01 ng/mL / x     / x     / x            Urinalysis Basic - ( 2019 19:00 )    Color: Yellow / Appearance: Clear / S.020 / pH: x  Gluc: x / Ketone: Negative  / Bili: Negative / Urobili: 0.2 mg/dL   Blood: x / Protein: 30 mg/dL / Nitrite: Negative   Leuk Esterase: Negative / RBC: 3-5 /HPF / WBC 1-2 /HPF   Sq Epi: x / Non Sq Epi: Moderate /HPF / Bacteria: Moderate          Imaging:    ECG:

## 2019-11-27 NOTE — CONSULT NOTE ADULT - SUBJECTIVE AND OBJECTIVE BOX
[id number]    HPI:  85yo Female w/ PMH of CHF, COPD not on home O2, HLD, dementia BIBA w/ CC of SOB that started today. + clear sputum production.     In ED, pt was in resp distress. BNP s.     CXR shows b/l pleural effusion and vascular congestion. IV lasix given and BIPAP applied to pt w/ great relief.     Pt denies other physical complaints. Pt denies fever/chills, CP, palpitation, abd pain, N/V/D, dysuria, swelling, change in mental status. (2019 20:54)      CC/ HPI Patient is a 84y old  Female who presents with a chief complaint of CHF exacerbation (2019 20:54)      FLUID OVERLOAD  ^SOB        PMHX:  Obstructive sleep apnea  Presenile dementia with depression  History of left bundle branch block  Metabolic encephalopathy  Urinary tract infection  Dementia  Hyperlipidemia  Hypertension  COPD (chronic obstructive pulmonary disease)  Congestive heart failure  Fluid overload  S/P removal of ovarian cyst  H/O lumpectomy  History of appendectomy  Pneumonia  Leukocytosis  Respiratory failure          FAMILY HISTORY:  FHx: lung cancer: father    ALL:  nitrofurantoin (Short breath; Hives)        Marital Status:  (   )    (   ) Single    (   )    (  )   Occupation:   Lives with: (  ) alone  (  ) children   (  ) spouse   (  ) parents  ( x ) other  Recent Travel:     Substance Use (street drugs): ( x ) never used  (  ) other:  Tobacco Usage:  (   ) never smoked   (  x ) former smoker   (   ) current smoker  (     ) pack year          Home prescriptions  aspirin 81 mg oral delayed release tablet: 1 tab(s) orally once a day  Coreg 6.25 mg oral tablet: 1 tab(s) orally 2 times a day  furosemide 20 mg oral tablet: 1 tab(s) orally every 48 hours  melatonin 5 mg oral tablet: 1 tab(s) orally once a day (at bedtime)  Zoloft 100 mg oral tablet: 1 tab(s) orally once a day            MEDICATIONS  (STANDING):  aspirin  chewable 81 milliGRAM(s) Oral daily  azithromycin  IVPB 500 milliGRAM(s) IV Intermittent every 24 hours  carvedilol 6.25 milliGRAM(s) Oral every 12 hours  cefTRIAXone   IVPB 1000 milliGRAM(s) IV Intermittent every 24 hours  chlorhexidine 4% Liquid 1 Application(s) Topical two times a day  enoxaparin Injectable 40 milliGRAM(s) SubCutaneous daily  furosemide   Injectable 40 milliGRAM(s) IV Push daily  pantoprazole    Tablet 40 milliGRAM(s) Oral before breakfast  sertraline 100 milliGRAM(s) Oral daily    MEDICATIONS  (PRN):  albuterol/ipratropium for Nebulization 3 milliLiter(s) Nebulizer every 6 hours PRN Shortness of Breath and/or Wheezing          T(C): 36 (19 @ 07:30), Max: 37.8 (19 @ 17:45)  HR: 83 (19 @ 07:30) (81 - 115)  BP: 120/58 (19 @ 07:30) (120/58 - 175/61)  RR: 20 (19 @ 07:30) (20 - 33)  SpO2: 95% (19 @ 22:04) (95% - 99%)  ICU Vital Signs Last 24 Hrs  T(C): 36 (2019 07:30), Max: 37.8 (2019 17:45)  T(F): 96.8 (2019 07:30), Max: 100.1 (2019 17:45)  HR: 83 (2019 07:30) (81 - 115)  BP: 120/58 (2019 07:30) (120/58 - 175/61)  BP(mean): 90 (2019 05:36) (83 - 90)    RR: 20 (2019 07:30) (20 - 33)  SpO2: 95% (2019 22:04) (95% - 99%)      I&O's Summary    2019 07:  -  2019 07:00  --------------------------------------------------------  IN: 0 mL / OUT: 400 mL / NET: -400 mL        I&O's Detail    2019 07:  -  2019 07:00  --------------------------------------------------------  IN:  Total IN: 0 mL    OUT:    Voided: 400 mL  Total OUT: 400 mL    Total NET: -400 mL          Drug Dosing Weight  Height (cm): 162.6 (2019 22:04)  Weight (kg): 95.3 (2019 18:33)  BMI (kg/m2): 36 (2019 22:04)  BSA (m2): 2 (2019 22:04)    LABS:                          13.1   11.97 )-----------( 99       ( 2019 05:40 )             40.4           136  |  97<L>  |  16  ----------------------------<  97  4.2   |  24  |  0.8    Ca    8.6      2019 05:40  Phos  3.7       Mg     1.9         TPro  8.5<H>  /  Alb  3.6  /  TBili  0.8  /  DBili  x   /  AST  57<H>  /  ALT  48<H>  /  AlkPhos  69  26      LIVER FUNCTIONS - ( 2019 18:00 )  Alb: 3.6 g/dL / Pro: 8.5 g/dL / ALK PHOS: 69 U/L / ALT: 48 U/L / AST: 57 U/L / GGT: x             CARDIAC MARKERS ( 2019 05:40 )  x     / 0.11 ng/mL / x     / x     / x      CARDIAC MARKERS ( 2019 18:00 )  x     / <0.01 ng/mL / x     / x     / x                Serum Pro-Brain Natriuretic Peptide: 2543 pg/mL (19 @ 18:00)        Urinalysis Basic - ( 2019 19:00 )    Color: Yellow / Appearance: Clear / S.020 / pH: x  Gluc: x / Ketone: Negative  / Bili: Negative / Urobili: 0.2 mg/dL   Blood: x / Protein: 30 mg/dL / Nitrite: Negative   Leuk Esterase: Negative / RBC: 3-5 /HPF / WBC 1-2 /HPF   Sq Epi: x / Non Sq Epi: Moderate /HPF / Bacteria: Moderate        azithromycin  IVPB 500 milliGRAM(s) IV Intermittent every 24 hours  cefTRIAXone   IVPB 1000 milliGRAM(s) IV Intermittent every 24 hours          RADIOLOGY:  I have personally reviewed all chest and other pertinent radiology films.      REVIEW OF SYSTEMS:     · CONSTITUTIONAL:   no fever   no chills.  no weight gain   no weight loss    · EYES:   no discharge,   no irritation,   no pain,   no redness,   no visual changes.    · ENMT:   Ears: no ear pain and no hearing problems.  Nose: no nasal congestion and no nasal drainage.  Mouth/Throat: no dysphagia,  no hoarseness and no throat pain.  Neck: no lumps, no pain, no stiffness and no swollen glands.    · CARDIOVASCULAR:   no chest pain,   no swelling  no palpitaions  no syncope    · RESPIRATORY:  +SOB,  +wheezing ,  +respiratory difficulty  no sputum production    · GASTROINTESTINAL:   no abdominal pain,   no bloating,   no constipation,   no diarrhea,   no nausea   no vomiting.    · GENITOURINARY:  no dysuria,   no frequency,   no urgency  no hematuria.    · MUSCULOSKELETAL:   no back pain,   no gout,   no musculoskeletal pain,  no neck pain,   no weakness.    · SKIN:   no abrasions,   no jaundice,   no lesions,   no pruritis,   no rashes.    · NEURO:   no loss of consciousness,   no gait abnormality,   no headache,   no sensory deficits,   no weakness.    · PSYCHIATRIC:   no known mental health issues  no anxiety  no depression  no suicidal thoughts  no aggressive thoughts towards others      ALLERGIC/IMMUNOLOGIC:   No active allergic or immunologic issues    all other systems are negative        PHYSICAL EXAM:     · CONSTITUTIONAL:   not Ill appearing,   well nourished,   NAD    · ENMT:   Airway patent,   Nasal mucosa clear.  Mouth with normal mucosa.   Throat has no vesicles,  no oropharyngeal exudates and uvula is midline.  No thrush    · EYES:   Clear bilaterally,   pupils equal,   round and reactive to light.    · CARDIAC:   Normal rate,   regular rhythm.    Heart sounds S1, S2.   no thrills or bruits on palpitation  normal  cardiac impulse  normal palpable location of the heart   No murmurs, no rubs or gallops on auscultation  no edema  no significant varices  femoral arteries palpable with normal impulse, no bruits  aorta palpable with normal impulse and no bruits      CAROTID:   normal systolic impulse  no bruits    · RESPIRATORY:   rales ronchi  normal chest expansion  +tachypneic,  no retractions or use of accessory muscles  palpation of chest is normal with no fremitus  percussion of chest demonstrates no hyperresonance or dullness    · GASTROINTESTINAL:  Abdomen soft,   non-tender,   no masses  no guarding,   + BS  liver normal size  spleen not palpable    GENITOURINARY  normal genitalia for sex  no edema    · MUSCULOSKELETAL:   Spine appears normal,   range of motion is not limited,  no significant muscle or joint tenderness  no clubbing, cyanosis  no petechiae    · NEUROLOGICAL:   Alert and oriented   no obvious focal deficits in cranial nerve areas  no motor or sensory deficits.  pertinent DTRs normal    · SKIN:   Skin normal color for race,   warm,   dry and intact.       · PSYCHIATRIC:   Alert and oriented to person,   place, time/situation.   normal mood and affect.   no apparent risk to self or others.    · HEME LYMPH:   no splenomegaly.  No cervical  lymphadenopathy.  no inguinal lymphadenopathy [id number]    HPI:  83yo Female w/ PMH of CHF EF 25%, COPD not on home O2, HLD, dementia BIBA w/ CC of SOB that started today. + clear sputum production.     In ED, pt was in resp distress. BNP 2000s.     CXR shows b/l pleural effusion and vascular congestion. IV lasix given and BIPAP applied to pt w/ great relief.     Pt denies other physical complaints. Pt denies fever/chills, CP, palpitation, abd pain, N/V/D, dysuria, swelling, change in mental status. (2019 20:54)    Cough with phlegm at baseline     CC/ HPI Patient is a 84y old  Female who presents with a chief complaint of CHF exacerbation (2019 20:54)      FLUID OVERLOAD  ^SOB        PMHX:  Obstructive sleep apnea  Presenile dementia with depression  History of left bundle branch block  Metabolic encephalopathy  Urinary tract infection  Dementia  Hyperlipidemia  Hypertension  COPD (chronic obstructive pulmonary disease)  Congestive heart failure  Fluid overload  S/P removal of ovarian cyst  H/O lumpectomy  History of appendectomy  Pneumonia  Leukocytosis  Respiratory failure    FAMILY HISTORY:  FHx: lung cancer: father    ALL:  nitrofurantoin (Short breath; Hives)        Marital Status:  (   )    (   ) Single    (   )    (  )   Occupation:   Lives with: (  ) alone  (  ) children   (  ) spouse   (  ) parents  ( x ) other  Recent Travel:     Substance Use (street drugs): ( x ) never used  (  ) other:  Tobacco Usage:  (   ) never smoked   (  x ) former smoker   (   ) current smoker  (     ) pack year    Home prescriptions  aspirin 81 mg oral delayed release tablet: 1 tab(s) orally once a day  Coreg 6.25 mg oral tablet: 1 tab(s) orally 2 times a day  furosemide 20 mg oral tablet: 1 tab(s) orally every 48 hours  melatonin 5 mg oral tablet: 1 tab(s) orally once a day (at bedtime)  Zoloft 100 mg oral tablet: 1 tab(s) orally once a day    MEDICATIONS  (STANDING):  aspirin  chewable 81 milliGRAM(s) Oral daily  azithromycin  IVPB 500 milliGRAM(s) IV Intermittent every 24 hours  carvedilol 6.25 milliGRAM(s) Oral every 12 hours  cefTRIAXone   IVPB 1000 milliGRAM(s) IV Intermittent every 24 hours  chlorhexidine 4% Liquid 1 Application(s) Topical two times a day  enoxaparin Injectable 40 milliGRAM(s) SubCutaneous daily  furosemide   Injectable 40 milliGRAM(s) IV Push daily  pantoprazole    Tablet 40 milliGRAM(s) Oral before breakfast  sertraline 100 milliGRAM(s) Oral daily    MEDICATIONS  (PRN):  albuterol/ipratropium for Nebulization 3 milliLiter(s) Nebulizer every 6 hours PRN Shortness of Breath and/or Wheezing    T(C): 36 (19 @ 07:30), Max: 37.8 (19 @ 17:45)  HR: 83 (19 @ 07:30) (81 - 115)  BP: 120/58 (19 @ 07:30) (120/58 - 175/61)  RR: 20 (19 @ 07:30) (20 - 33)  SpO2: 95% (19 @ 22:04) (95% - 99%)  ICU Vital Signs Last 24 Hrs  T(C): 36 (2019 07:30), Max: 37.8 (2019 17:45)  T(F): 96.8 (2019 07:30), Max: 100.1 (2019 17:45)  HR: 83 (2019 07:30) (81 - 115)  BP: 120/58 (2019 07:30) (120/58 - 175/61)  BP(mean): 90 (2019 05:36) (83 - 90)    RR: 20 (2019 07:30) (20 - 33)  SpO2: 95% (2019 22:04) (95% - 99%)      I&O's Summary    2019 07:  -  2019 07:00  --------------------------------------------------------  IN: 0 mL / OUT: 400 mL / NET: -400 mL        I&O's Detail    2019 07:  -  2019 07:00  --------------------------------------------------------  IN:  Total IN: 0 mL    OUT:    Voided: 400 mL  Total OUT: 400 mL    Total NET: -400 mL    Drug Dosing Weight  Height (cm): 162.6 (2019 22:04)  Weight (kg): 95.3 (2019 18:33)  BMI (kg/m2): 36 (2019 22:04)  BSA (m2): 2 (2019 22:04)    LABS:                        13.1   11.97 )-----------( 99       ( 2019 05:40 )             40.4           136  |  97<L>  |  16  ----------------------------<  97  4.2   |  24  |  0.8    Ca    8.6      2019 05:40  Phos  3.7       Mg     1.9         TPro  8.5<H>  /  Alb  3.6  /  TBili  0.8  /  DBili  x   /  AST  57<H>  /  ALT  48<H>  /  AlkPhos  69        LIVER FUNCTIONS - ( 2019 18:00 )  Alb: 3.6 g/dL / Pro: 8.5 g/dL / ALK PHOS: 69 U/L / ALT: 48 U/L / AST: 57 U/L / GGT: x             CARDIAC MARKERS ( 2019 05:40 )  x     / 0.11 ng/mL / x     / x     / x      CARDIAC MARKERS ( 2019 18:00 )  x     / <0.01 ng/mL / x     / x     / x        Serum Pro-Brain Natriuretic Peptide: 2543 pg/mL (19 @ 18:00)    Urinalysis Basic - ( 2019 19:00 )    Color: Yellow / Appearance: Clear / S.020 / pH: x  Gluc: x / Ketone: Negative  / Bili: Negative / Urobili: 0.2 mg/dL   Blood: x / Protein: 30 mg/dL / Nitrite: Negative   Leuk Esterase: Negative / RBC: 3-5 /HPF / WBC 1-2 /HPF   Sq Epi: x / Non Sq Epi: Moderate /HPF / Bacteria: Moderate    azithromycin  IVPB 500 milliGRAM(s) IV Intermittent every 24 hours  cefTRIAXone   IVPB 1000 milliGRAM(s) IV Intermittent every 24 hours    RADIOLOGY:  I have personally reviewed all chest and other pertinent radiology films.      REVIEW OF SYSTEMS:     · CONSTITUTIONAL:   no fever   no chills.  no weight gain   no weight loss    · EYES:   no discharge,   no irritation,   no pain,   no redness,   no visual changes.    · ENMT:   Ears: no ear pain and no hearing problems.  Nose: no nasal congestion and no nasal drainage.  Mouth/Throat: no dysphagia,  no hoarseness and no throat pain.  Neck: no lumps, no pain, no stiffness and no swollen glands.    · CARDIOVASCULAR:   no chest pain,   no swelling  no palpitaions  no syncope    · RESPIRATORY:  +SOB,  +wheezing ,  +respiratory difficulty  no sputum production    · GASTROINTESTINAL:   no abdominal pain,   no bloating,   no constipation,   no diarrhea,   no nausea   no vomiting.    · GENITOURINARY:  no dysuria,   no frequency,   no urgency  no hematuria.    · MUSCULOSKELETAL:   no back pain,   no gout,   no musculoskeletal pain,  no neck pain,   no weakness.    · SKIN:   no abrasions,   no jaundice,   no lesions,   no pruritis,   no rashes.    · NEURO:   no loss of consciousness,   no gait abnormality,   no headache,   no sensory deficits,   no weakness.    · PSYCHIATRIC:   no known mental health issues  no anxiety  no depression  no suicidal thoughts  no aggressive thoughts towards others      ALLERGIC/IMMUNOLOGIC:   No active allergic or immunologic issues    all other systems are negative        PHYSICAL EXAM:     · CONSTITUTIONAL:   not Ill appearing,   well nourished,   NAD    · ENMT:   Airway patent,   Nasal mucosa clear.  Mouth with normal mucosa.   Throat has no vesicles,  no oropharyngeal exudates and uvula is midline.  No thrush    · EYES:   Clear bilaterally,   pupils equal,   round and reactive to light.    · CARDIAC:   Normal rate,   regular rhythm.    Heart sounds S1, S2.   no thrills or bruits on palpitation  normal  cardiac impulse  normal palpable location of the heart   No murmurs, no rubs or gallops on auscultation  no edema  no significant varices  femoral arteries palpable with normal impulse, no bruits  aorta palpable with normal impulse and no bruits      CAROTID:   normal systolic impulse  no bruits    · RESPIRATORY:   rales ronchi  normal chest expansion  +tachypneic,  no retractions or use of accessory muscles  palpation of chest is normal with no fremitus  percussion of chest demonstrates no hyperresonance or dullness    · GASTROINTESTINAL:  Abdomen soft,   non-tender,   no masses  no guarding,   + BS  liver normal size  spleen not palpable    GENITOURINARY  normal genitalia for sex  no edema    · MUSCULOSKELETAL:   Spine appears normal,   range of motion is not limited,  no significant muscle or joint tenderness  no clubbing, cyanosis  no petechiae    · NEUROLOGICAL:   Alert and oriented   no obvious focal deficits in cranial nerve areas  no motor or sensory deficits.  pertinent DTRs normal    · SKIN:   Skin normal color for race,   warm,   dry and intact.       · PSYCHIATRIC:   Alert and oriented to person,   place, time/situation.   normal mood and affect.   no apparent risk to self or others.    · HEME LYMPH:   no splenomegaly.  No cervical  lymphadenopathy.  no inguinal lymphadenopathy    CXR : Rt LL opacity [id number]    HPI:  85yo Female w/ PMH of CHF EF 25%, COPD not on home O2, HLD, dementia BIBA w/ CC of SOB that started today. + clear sputum production.     In ED, pt was in resp distress. BNP s.     CXR shows b/l pleural effusion and vascular congestion. IV lasix given and BIPAP applied to pt w/ great relief.     Pt denies other physical complaints. Pt denies fever/chills, CP, palpitation, abd pain, N/V/D, dysuria, swelling, change in mental status. (2019 20:54)    Cough with more phlegm than at baseline     CC/ HPI Patient is a 84y old  Female who presents with a chief complaint of CHF exacerbation (2019 20:54)      FLUID OVERLOAD  ^SOB        PMHX:  Obstructive sleep apnea  Presenile dementia with depression  History of left bundle branch block  Metabolic encephalopathy  Urinary tract infection  Dementia  Hyperlipidemia  Hypertension  COPD (chronic obstructive pulmonary disease)  Congestive heart failure  Fluid overload  S/P removal of ovarian cyst  H/O lumpectomy  History of appendectomy  Pneumonia  Leukocytosis  Respiratory failure    FAMILY HISTORY:  FHx: lung cancer: father    ALL:  nitrofurantoin (Short breath; Hives)        Marital Status:  (   )    (   ) Single    (   )    (  )   Occupation:   Lives with: (  ) alone  (  ) children   (  ) spouse   (  ) parents  ( x ) other  Recent Travel:     Substance Use (street drugs): ( x ) never used  (  ) other:  Tobacco Usage:  (   ) never smoked   (  x ) former smoker   (   ) current smoker  (     ) pack year    Home prescriptions  aspirin 81 mg oral delayed release tablet: 1 tab(s) orally once a day  Coreg 6.25 mg oral tablet: 1 tab(s) orally 2 times a day  furosemide 20 mg oral tablet: 1 tab(s) orally every 48 hours  melatonin 5 mg oral tablet: 1 tab(s) orally once a day (at bedtime)  Zoloft 100 mg oral tablet: 1 tab(s) orally once a day    MEDICATIONS  (STANDING):  aspirin  chewable 81 milliGRAM(s) Oral daily  azithromycin  IVPB 500 milliGRAM(s) IV Intermittent every 24 hours  carvedilol 6.25 milliGRAM(s) Oral every 12 hours  cefTRIAXone   IVPB 1000 milliGRAM(s) IV Intermittent every 24 hours  chlorhexidine 4% Liquid 1 Application(s) Topical two times a day  enoxaparin Injectable 40 milliGRAM(s) SubCutaneous daily  furosemide   Injectable 40 milliGRAM(s) IV Push daily  pantoprazole    Tablet 40 milliGRAM(s) Oral before breakfast  sertraline 100 milliGRAM(s) Oral daily    MEDICATIONS  (PRN):  albuterol/ipratropium for Nebulization 3 milliLiter(s) Nebulizer every 6 hours PRN Shortness of Breath and/or Wheezing    T(C): 36 (19 @ 07:30), Max: 37.8 (19 @ 17:45)  HR: 83 (19 @ 07:30) (81 - 115)  BP: 120/58 (19 @ 07:30) (120/58 - 175/61)  RR: 20 (19 @ 07:30) (20 - 33)  SpO2: 95% (19 @ 22:04) (95% - 99%)  ICU Vital Signs Last 24 Hrs  T(C): 36 (2019 07:30), Max: 37.8 (2019 17:45)  T(F): 96.8 (2019 07:30), Max: 100.1 (2019 17:45)  HR: 83 (2019 07:30) (81 - 115)  BP: 120/58 (2019 07:30) (120/58 - 175/61)  BP(mean): 90 (2019 05:36) (83 - 90)    RR: 20 (2019 07:30) (20 - 33)  SpO2: 95% (2019 22:04) (95% - 99%)      I&O's Summary    2019 07:  -  2019 07:00  --------------------------------------------------------  IN: 0 mL / OUT: 400 mL / NET: -400 mL        I&O's Detail    2019 07:  -  2019 07:00  --------------------------------------------------------  IN:  Total IN: 0 mL    OUT:    Voided: 400 mL  Total OUT: 400 mL    Total NET: -400 mL    Drug Dosing Weight  Height (cm): 162.6 (2019 22:04)  Weight (kg): 95.3 (2019 18:33)  BMI (kg/m2): 36 (2019 22:04)  BSA (m2): 2 (2019 22:04)    LABS:                        13.1   11.97 )-----------( 99       ( 2019 05:40 )             40.4           136  |  97<L>  |  16  ----------------------------<  97  4.2   |  24  |  0.8    Ca    8.6      2019 05:40  Phos  3.7       Mg     1.9         TPro  8.5<H>  /  Alb  3.6  /  TBili  0.8  /  DBili  x   /  AST  57<H>  /  ALT  48<H>  /  AlkPhos  69        LIVER FUNCTIONS - ( 2019 18:00 )  Alb: 3.6 g/dL / Pro: 8.5 g/dL / ALK PHOS: 69 U/L / ALT: 48 U/L / AST: 57 U/L / GGT: x             CARDIAC MARKERS ( 2019 05:40 )  x     / 0.11 ng/mL / x     / x     / x      CARDIAC MARKERS ( 2019 18:00 )  x     / <0.01 ng/mL / x     / x     / x        Serum Pro-Brain Natriuretic Peptide: 2543 pg/mL (19 @ 18:00)    Urinalysis Basic - ( 2019 19:00 )    Color: Yellow / Appearance: Clear / S.020 / pH: x  Gluc: x / Ketone: Negative  / Bili: Negative / Urobili: 0.2 mg/dL   Blood: x / Protein: 30 mg/dL / Nitrite: Negative   Leuk Esterase: Negative / RBC: 3-5 /HPF / WBC 1-2 /HPF   Sq Epi: x / Non Sq Epi: Moderate /HPF / Bacteria: Moderate    azithromycin  IVPB 500 milliGRAM(s) IV Intermittent every 24 hours  cefTRIAXone   IVPB 1000 milliGRAM(s) IV Intermittent every 24 hours    RADIOLOGY:  I have personally reviewed all chest and other pertinent radiology films.      REVIEW OF SYSTEMS:     · CONSTITUTIONAL:   no fever   no chills.  no weight gain   no weight loss    · EYES:   no discharge,   no irritation,   no pain,   no redness,   no visual changes.    · ENMT:   Ears: no ear pain and no hearing problems.  Nose: no nasal congestion and no nasal drainage.  Mouth/Throat: no dysphagia,  no hoarseness and no throat pain.  Neck: no lumps, no pain, no stiffness and no swollen glands.    · CARDIOVASCULAR:   no chest pain,   no swelling  no palpitaions  no syncope    · RESPIRATORY:  +SOB,  +wheezing ,  +respiratory difficulty  no sputum production    · GASTROINTESTINAL:   no abdominal pain,   no bloating,   no constipation,   no diarrhea,   no nausea   no vomiting.    · GENITOURINARY:  no dysuria,   no frequency,   no urgency  no hematuria.    · MUSCULOSKELETAL:   no back pain,   no gout,   no musculoskeletal pain,  no neck pain,   no weakness.    · SKIN:   no abrasions,   no jaundice,   no lesions,   no pruritis,   no rashes.    · NEURO:   no loss of consciousness,   no gait abnormality,   no headache,   no sensory deficits,   no weakness.    · PSYCHIATRIC:   no known mental health issues  no anxiety  no depression  no suicidal thoughts  no aggressive thoughts towards others      ALLERGIC/IMMUNOLOGIC:   No active allergic or immunologic issues    all other systems are negative        PHYSICAL EXAM:     · CONSTITUTIONAL:   not Ill appearing,   well nourished,   NAD    · ENMT:   Airway patent,   Nasal mucosa clear.  Mouth with normal mucosa.   Throat has no vesicles,  no oropharyngeal exudates and uvula is midline.  No thrush    · EYES:   Clear bilaterally,   pupils equal,   round and reactive to light.    · CARDIAC:   Normal rate,   regular rhythm.    Heart sounds S1, S2.   no thrills or bruits on palpitation  normal  cardiac impulse  normal palpable location of the heart   No murmurs, no rubs or gallops on auscultation  no edema  no significant varices  femoral arteries palpable with normal impulse, no bruits  aorta palpable with normal impulse and no bruits      CAROTID:   normal systolic impulse  no bruits    · RESPIRATORY:   rales ronchi  normal chest expansion  +tachypneic,  no retractions or use of accessory muscles  palpation of chest is normal with no fremitus  percussion of chest demonstrates no hyperresonance or dullness    · GASTROINTESTINAL:  Abdomen soft,   non-tender,   no masses  no guarding,   + BS  liver normal size  spleen not palpable    GENITOURINARY  normal genitalia for sex  no edema    · MUSCULOSKELETAL:   Spine appears normal,   range of motion is not limited,  no significant muscle or joint tenderness  no clubbing, cyanosis  no petechiae    · NEUROLOGICAL:   Alert and oriented   no obvious focal deficits in cranial nerve areas  no motor or sensory deficits.  pertinent DTRs normal    · SKIN:   Skin normal color for race,   warm,   dry and intact.       · PSYCHIATRIC:   Alert and oriented to person,   place, time/situation.   normal mood and affect.   no apparent risk to self or others.    · HEME LYMPH:   no splenomegaly.  No cervical  lymphadenopathy.  no inguinal lymphadenopathy    CXR : Rt LL opacity

## 2019-11-27 NOTE — CONSULT NOTE ADULT - SUBJECTIVE AND OBJECTIVE BOX
CARDIOLOGY CONSULT NOTE     CHIEF COMPLAINT/REASON FOR CONSULT:    HPI:  83yo Female w/ PMH of CHF, COPD not on home O2, HLD, dementia BIBA w/ CC of SOB that started today. + clear sputum production. In ED, pt was in resp distress. BNP 2000s. CXR shows b/l pleural effusion and vascular congestion. IV lasix given and BIPAP applied to pt w/ great relief. Pt denies other physical complaints. Pt denies fever/chills, CP, palpitation, abd pain, N/V/D, dysuria, swelling, change in mental status. (26 Nov 2019 20:54)      PAST MEDICAL & SURGICAL HISTORY:  Obstructive sleep apnea  Presenile dementia with depression  History of left bundle branch block  Metabolic encephalopathy  Urinary tract infection  Dementia  Hyperlipidemia  Hypertension  COPD (chronic obstructive pulmonary disease)  Congestive heart failure  S/P removal of ovarian cyst  H/O lumpectomy  History of appendectomy      Cardiac Risks:   [x ]HTN, [ ] DM, [ ] Smoking, [ x] FH,  [ x] Lipids        MEDICATIONS:  MEDICATIONS  (STANDING):  aspirin  chewable 81 milliGRAM(s) Oral daily  azithromycin  IVPB 500 milliGRAM(s) IV Intermittent every 24 hours  carvedilol 6.25 milliGRAM(s) Oral every 12 hours  cefTRIAXone   IVPB 1000 milliGRAM(s) IV Intermittent every 24 hours  chlorhexidine 4% Liquid 1 Application(s) Topical two times a day  enoxaparin Injectable 40 milliGRAM(s) SubCutaneous daily  furosemide   Injectable 40 milliGRAM(s) IV Push daily  pantoprazole    Tablet 40 milliGRAM(s) Oral before breakfast  sertraline 100 milliGRAM(s) Oral daily      FAMILY HISTORY:  FHx: lung cancer: father      SOCIAL HISTORY:      [ ] Marital status  Divourced  Allergies    nitrofurantoin (Short breath; Hives)    Intolerances    	    REVIEW OF SYSTEMS: History from daughter  CONSTITUTIONAL: No fever, weight loss, or fatigue  EYES: No eye pain, visual disturbances, or discharge  ENMT:  No difficulty hearing, tinnitus, vertigo; No sinus or throat pain  NECK: No pain or stiffness  RESPIRATORY: No cough, wheezing, chills or hemoptysis; No Shortness of Breath  CARDIOVASCULAR: No chest pain, palpitations, passing out, dizziness, or leg swelling  GASTROINTESTINAL: No abdominal or epigastric pain. No nausea, vomiting, or hematemesis; No diarrhea or constipation. No melena or hematochezia.  GENITOURINARY: No dysuria, frequency, hematuria, or incontinence  NEUROLOGICAL: No headaches, memory loss, loss of strength, numbness, or tremors. Dementia  SKIN: No itching, burning, rashes, or lesions   	        PHYSICAL EXAM:  T(C): 36 (11-27-19 @ 07:30), Max: 37.8 (11-26-19 @ 17:45)  HR: 83 (11-27-19 @ 07:30) (74 - 115)  BP: 120/58 (11-27-19 @ 07:30) (120/58 - 175/61)  RR: 20 (11-27-19 @ 07:30) (20 - 33)  SpO2: 97% (11-27-19 @ 07:08) (95% - 99%)  Wt(kg): --  I&O's Summary    26 Nov 2019 07:01  -  27 Nov 2019 07:00  --------------------------------------------------------  IN: 0 mL / OUT: 400 mL / NET: -400 mL        Appearance: Normal	  Psychiatry: A & O x 3, Mood & affect appropriate  HEENT:   Normal oral mucosa, PERRL, EOMI	  Lymphatic: No lymphadenopathy  Cardiovascular: Normal S1 S2,RRR, No JVD, No murmurs  Respiratory: Lungs clear to auscultation	  Gastrointestinal:  Soft, Non-tender, + BS	  Skin: No rashes, No ecchymoses, No cyanosis	  Neurologic: Non-focal  Extremities: Normal range of motion, No clubbing, cyanosis or edema  Vascular: Peripheral pulses palpable 2+ bilaterally      ECG:  	st lad lbbb    	  LABS:	 	    CARDIAC MARKERS:          Serum Pro-Brain Natriuretic Peptide: 2543 pg/mL (11-26 @ 18:00)                            13.1   11.97 )-----------( 99       ( 27 Nov 2019 05:40 )             40.4     11-27    136  |  97<L>  |  16  ----------------------------<  97  4.2   |  24  |  0.8    Ca    8.6      27 Nov 2019 05:40  Phos  3.7     11-27  Mg     1.9     11-27    TPro  8.5<H>  /  Alb  3.6  /  TBili  0.8  /  DBili  x   /  AST  57<H>  /  ALT  48<H>  /  AlkPhos  69  11-26      proBNP: Serum Pro-Brain Natriuretic Peptide: 2543 pg/mL (11-26 @ 18:00)

## 2019-11-28 LAB
ANION GAP SERPL CALC-SCNC: 11 MMOL/L — SIGNIFICANT CHANGE UP (ref 7–14)
BUN SERPL-MCNC: 24 MG/DL — HIGH (ref 10–20)
CALCIUM SERPL-MCNC: 8.5 MG/DL — SIGNIFICANT CHANGE UP (ref 8.5–10.1)
CHLORIDE SERPL-SCNC: 98 MMOL/L — SIGNIFICANT CHANGE UP (ref 98–110)
CO2 SERPL-SCNC: 26 MMOL/L — SIGNIFICANT CHANGE UP (ref 17–32)
CREAT SERPL-MCNC: 0.9 MG/DL — SIGNIFICANT CHANGE UP (ref 0.7–1.5)
CULTURE RESULTS: NO GROWTH — SIGNIFICANT CHANGE UP
GLUCOSE SERPL-MCNC: 107 MG/DL — HIGH (ref 70–99)
HCT VFR BLD CALC: 38.9 % — SIGNIFICANT CHANGE UP (ref 37–47)
HGB BLD-MCNC: 12.7 G/DL — SIGNIFICANT CHANGE UP (ref 12–16)
MCHC RBC-ENTMCNC: 28.6 PG — SIGNIFICANT CHANGE UP (ref 27–31)
MCHC RBC-ENTMCNC: 32.6 G/DL — SIGNIFICANT CHANGE UP (ref 32–37)
MCV RBC AUTO: 87.6 FL — SIGNIFICANT CHANGE UP (ref 81–99)
NRBC # BLD: 0 /100 WBCS — SIGNIFICANT CHANGE UP (ref 0–0)
PLATELET # BLD AUTO: 102 K/UL — LOW (ref 130–400)
POTASSIUM SERPL-MCNC: 3.7 MMOL/L — SIGNIFICANT CHANGE UP (ref 3.5–5)
POTASSIUM SERPL-SCNC: 3.7 MMOL/L — SIGNIFICANT CHANGE UP (ref 3.5–5)
RBC # BLD: 4.44 M/UL — SIGNIFICANT CHANGE UP (ref 4.2–5.4)
RBC # FLD: 14.4 % — SIGNIFICANT CHANGE UP (ref 11.5–14.5)
SODIUM SERPL-SCNC: 135 MMOL/L — SIGNIFICANT CHANGE UP (ref 135–146)
SPECIMEN SOURCE: SIGNIFICANT CHANGE UP
TSH SERPL-MCNC: 2.06 UIU/ML — SIGNIFICANT CHANGE UP (ref 0.27–4.2)
WBC # BLD: 9.01 K/UL — SIGNIFICANT CHANGE UP (ref 4.8–10.8)
WBC # FLD AUTO: 9.01 K/UL — SIGNIFICANT CHANGE UP (ref 4.8–10.8)

## 2019-11-28 PROCEDURE — 99233 SBSQ HOSP IP/OBS HIGH 50: CPT

## 2019-11-28 PROCEDURE — 71045 X-RAY EXAM CHEST 1 VIEW: CPT | Mod: 26

## 2019-11-28 RX ORDER — LOSARTAN POTASSIUM 100 MG/1
50 TABLET, FILM COATED ORAL DAILY
Refills: 0 | Status: DISCONTINUED | OUTPATIENT
Start: 2019-11-28 | End: 2019-12-02

## 2019-11-28 RX ORDER — FUROSEMIDE 40 MG
40 TABLET ORAL DAILY
Refills: 0 | Status: DISCONTINUED | OUTPATIENT
Start: 2019-11-28 | End: 2019-12-02

## 2019-11-28 RX ADMIN — Medication 81 MILLIGRAM(S): at 11:42

## 2019-11-28 RX ADMIN — CHLORHEXIDINE GLUCONATE 1 APPLICATION(S): 213 SOLUTION TOPICAL at 06:10

## 2019-11-28 RX ADMIN — CARVEDILOL PHOSPHATE 6.25 MILLIGRAM(S): 80 CAPSULE, EXTENDED RELEASE ORAL at 17:29

## 2019-11-28 RX ADMIN — Medication 5 MILLIGRAM(S): at 21:13

## 2019-11-28 RX ADMIN — Medication 1 TABLET(S): at 17:29

## 2019-11-28 RX ADMIN — SERTRALINE 100 MILLIGRAM(S): 25 TABLET, FILM COATED ORAL at 11:42

## 2019-11-28 RX ADMIN — ENOXAPARIN SODIUM 40 MILLIGRAM(S): 100 INJECTION SUBCUTANEOUS at 11:42

## 2019-11-28 RX ADMIN — Medication 40 MILLIGRAM(S): at 06:09

## 2019-11-28 NOTE — PROGRESS NOTE ADULT - SUBJECTIVE AND OBJECTIVE BOX
Patient is a 84y old  Female who presents with a chief complaint of CHF exacerbation (2019 07:27)      Over Night Events:  Patient seen and examined feel better not on resp distress       ROS:  See HPI    PHYSICAL EXAM    ICU Vital Signs Last 24 Hrs  T(C): 36.1 (2019 07:01), Max: 36.1 (2019 07:01)  T(F): 96.9 (2019 07:01), Max: 96.9 (2019 07:01)  HR: 77 (2019 06:00) (70 - 77)  BP: 151/62 (2019 06:00) (129/59 - 151/62)  BP(mean): 89 (2019 06:00) (81 - 97)  ABP: --  ABP(mean): --  RR: 20 (2019 07:01) (19 - 26)  SpO2: 94% (2019 04:34) (89% - 98%)      General: awake   HEENT:    bell            Lymph Nodes: NO cervical LN   Lungs: Bilateral crackle s  Cardiovascular: Regular   Abdomen: Soft, Positive BS  Extremities: No clubbing   Skin: warm   Neurological: no focal deficit   Musculoskeletal: move all ext     I&O's Detail    2019 07:01  -  2019 07:00  --------------------------------------------------------  IN:    Oral Fluid: 100 mL  Total IN: 100 mL    OUT:    Voided: 1000 mL  Total OUT: 1000 mL    Total NET: -900 mL          LABS:                          12.7   9.01  )-----------( 102      ( 2019 05:34 )             38.9         2019 05:34    135    |  98     |  24     ----------------------------<  107    3.7     |  26     |  0.9      Ca    8.5        2019 05:34  Phos  3.7       2019 05:40  Mg     1.9       2019 05:40                                                                                      Urinalysis Basic - ( 2019 19:00 )    Color: Yellow / Appearance: Clear / S.020 / pH: x  Gluc: x / Ketone: Negative  / Bili: Negative / Urobili: 0.2 mg/dL   Blood: x / Protein: 30 mg/dL / Nitrite: Negative   Leuk Esterase: Negative / RBC: 3-5 /HPF / WBC 1-2 /HPF   Sq Epi: x / Non Sq Epi: Moderate /HPF / Bacteria: Moderate          CARDIAC MARKERS ( 2019 11:28 )  x     / 0.10 ng/mL / x     / x     / x      CARDIAC MARKERS ( 2019 05:40 )  x     / 0.11 ng/mL / x     / x     / x      CARDIAC MARKERS ( 2019 18:00 )  x     / <0.01 ng/mL / x     / x     / x                                                            Culture - Blood (collected 2019 19:50)  Source: .Blood Blood-Peripheral  Preliminary Report (2019 04:01):    No growth to date.    Culture - Blood (collected 2019 19:45)  Source: .Blood Blood-Peripheral  Preliminary Report (2019 04:01):    No growth to date.                                                                                           MEDICATIONS  (STANDING):  amoxicillin  875 milliGRAM(s)/clavulanate 1 Tablet(s) Oral two times a day  aspirin  chewable 81 milliGRAM(s) Oral daily  carvedilol 6.25 milliGRAM(s) Oral every 12 hours  chlorhexidine 4% Liquid 1 Application(s) Topical two times a day  enoxaparin Injectable 40 milliGRAM(s) SubCutaneous daily  furosemide   Injectable 40 milliGRAM(s) IV Push daily  losartan 50 milliGRAM(s) Oral daily  melatonin 5 milliGRAM(s) Oral at bedtime  pantoprazole    Tablet 40 milliGRAM(s) Oral before breakfast  sertraline 100 milliGRAM(s) Oral daily    MEDICATIONS  (PRN):  albuterol/ipratropium for Nebulization 3 milliLiter(s) Nebulizer every 6 hours PRN Shortness of Breath and/or Wheezing          Xrays:  TLC:  OG:  ET tube:                                                                                       ECHO:  [facility] ICU:

## 2019-11-28 NOTE — PROGRESS NOTE ADULT - SUBJECTIVE AND OBJECTIVE BOX
Patient is a 84y old  Female who presents with a chief complaint of CHF exacerbation (27 Nov 2019 18:00)      T(F): 96.9 (11-28-19 @ 07:01), Max: 96.9 (11-28-19 @ 07:01)  HR: 77 (11-28-19 @ 06:00)  BP: 151/62 (11-28-19 @ 06:00)  RR: 20 (11-28-19 @ 07:01)  SpO2: 94% (11-28-19 @ 04:34) (89% - 98%)    PHYSICAL EXAM:  GENERAL: NAD, well-groomed, well-developed  HEAD:  Atraumatic, Normocephalic  EYES: EOMI, PERRLA, conjunctiva and sclera clear  ENMT: No tonsillar erythema, exudates, or enlargement; Moist mucous membranes, Good dentition, No lesions  NECK: Supple, No JVD, Normal thyroid  NERVOUS SYSTEM:  Alert & Oriented X3,  Motor Strength 5/5 B/L upper and lower extremities  CHEST/LUNG: Clear to percussion bilaterally; No rales, rhonchi, wheezing, or rubs  HEART: Regular rate and rhythm; No murmurs, rubs, or gallops  ABDOMEN: Soft, Nontender, Nondistended; Bowel sounds present  EXTREMITIES:   No clubbing, cyanosis, or edema  LYMPH: No lymphadenopathy noted  SKIN: No rashes or lesions    labs  11-28    135  |  98  |  24<H>  ----------------------------<  107<H>  3.7   |  26  |  0.9    Ca    8.5      28 Nov 2019 05:34  Phos  3.7     11-27  Mg     1.9     11-27    TPro  8.5<H>  /  Alb  3.6  /  TBili  0.8  /  DBili  x   /  AST  57<H>  /  ALT  48<H>  /  AlkPhos  69  11-26                          12.7   9.01  )-----------( 102      ( 28 Nov 2019 05:34 )             38.9       Culture - Blood (collected 26 Nov 2019 19:50)  Source: .Blood Blood-Peripheral  Preliminary Report (28 Nov 2019 04:01):    No growth to date.    Culture - Blood (collected 26 Nov 2019 19:45)  Source: .Blood Blood-Peripheral  Preliminary Report (28 Nov 2019 04:01):    No growth to date.          Troponin T, Serum: 0.10 ng/mL <HH> (11-27-19 @ 11:28)      albuterol/ipratropium for Nebulization 3 milliLiter(s) Nebulizer every 6 hours PRN  amoxicillin  875 milliGRAM(s)/clavulanate 1 Tablet(s) Oral two times a day  aspirin  chewable 81 milliGRAM(s) Oral daily  carvedilol 6.25 milliGRAM(s) Oral every 12 hours  chlorhexidine 4% Liquid 1 Application(s) Topical two times a day  enoxaparin Injectable 40 milliGRAM(s) SubCutaneous daily  furosemide   Injectable 40 milliGRAM(s) IV Push daily  melatonin 5 milliGRAM(s) Oral at bedtime  pantoprazole    Tablet 40 milliGRAM(s) Oral before breakfast  sertraline 100 milliGRAM(s) Oral daily

## 2019-11-29 LAB
ANION GAP SERPL CALC-SCNC: 14 MMOL/L — SIGNIFICANT CHANGE UP (ref 7–14)
BASOPHILS # BLD AUTO: 0.04 K/UL — SIGNIFICANT CHANGE UP (ref 0–0.2)
BASOPHILS NFR BLD AUTO: 0.4 % — SIGNIFICANT CHANGE UP (ref 0–1)
BUN SERPL-MCNC: 22 MG/DL — HIGH (ref 10–20)
CALCIUM SERPL-MCNC: 8.6 MG/DL — SIGNIFICANT CHANGE UP (ref 8.5–10.1)
CHLORIDE SERPL-SCNC: 96 MMOL/L — LOW (ref 98–110)
CO2 SERPL-SCNC: 28 MMOL/L — SIGNIFICANT CHANGE UP (ref 17–32)
CREAT SERPL-MCNC: 0.8 MG/DL — SIGNIFICANT CHANGE UP (ref 0.7–1.5)
EOSINOPHIL # BLD AUTO: 0.13 K/UL — SIGNIFICANT CHANGE UP (ref 0–0.7)
EOSINOPHIL NFR BLD AUTO: 1.3 % — SIGNIFICANT CHANGE UP (ref 0–8)
GLUCOSE SERPL-MCNC: 114 MG/DL — HIGH (ref 70–99)
HCT VFR BLD CALC: 40 % — SIGNIFICANT CHANGE UP (ref 37–47)
HGB BLD-MCNC: 13.2 G/DL — SIGNIFICANT CHANGE UP (ref 12–16)
IMM GRANULOCYTES NFR BLD AUTO: 0.3 % — SIGNIFICANT CHANGE UP (ref 0.1–0.3)
LYMPHOCYTES # BLD AUTO: 3.77 K/UL — HIGH (ref 1.2–3.4)
LYMPHOCYTES # BLD AUTO: 37.4 % — SIGNIFICANT CHANGE UP (ref 20.5–51.1)
MAGNESIUM SERPL-MCNC: 1.9 MG/DL — SIGNIFICANT CHANGE UP (ref 1.8–2.4)
MCHC RBC-ENTMCNC: 28.3 PG — SIGNIFICANT CHANGE UP (ref 27–31)
MCHC RBC-ENTMCNC: 33 G/DL — SIGNIFICANT CHANGE UP (ref 32–37)
MCV RBC AUTO: 85.8 FL — SIGNIFICANT CHANGE UP (ref 81–99)
MONOCYTES # BLD AUTO: 0.97 K/UL — HIGH (ref 0.1–0.6)
MONOCYTES NFR BLD AUTO: 9.6 % — HIGH (ref 1.7–9.3)
NEUTROPHILS # BLD AUTO: 5.14 K/UL — SIGNIFICANT CHANGE UP (ref 1.4–6.5)
NEUTROPHILS NFR BLD AUTO: 51 % — SIGNIFICANT CHANGE UP (ref 42.2–75.2)
NRBC # BLD: 0 /100 WBCS — SIGNIFICANT CHANGE UP (ref 0–0)
PHOSPHATE SERPL-MCNC: 4 MG/DL — SIGNIFICANT CHANGE UP (ref 2.1–4.9)
PLATELET # BLD AUTO: 101 K/UL — LOW (ref 130–400)
POTASSIUM SERPL-MCNC: 3.6 MMOL/L — SIGNIFICANT CHANGE UP (ref 3.5–5)
POTASSIUM SERPL-SCNC: 3.6 MMOL/L — SIGNIFICANT CHANGE UP (ref 3.5–5)
RBC # BLD: 4.66 M/UL — SIGNIFICANT CHANGE UP (ref 4.2–5.4)
RBC # FLD: 14.3 % — SIGNIFICANT CHANGE UP (ref 11.5–14.5)
SODIUM SERPL-SCNC: 138 MMOL/L — SIGNIFICANT CHANGE UP (ref 135–146)
WBC # BLD: 10.08 K/UL — SIGNIFICANT CHANGE UP (ref 4.8–10.8)
WBC # FLD AUTO: 10.08 K/UL — SIGNIFICANT CHANGE UP (ref 4.8–10.8)

## 2019-11-29 PROCEDURE — 99232 SBSQ HOSP IP/OBS MODERATE 35: CPT

## 2019-11-29 PROCEDURE — 71045 X-RAY EXAM CHEST 1 VIEW: CPT | Mod: 26

## 2019-11-29 RX ADMIN — CARVEDILOL PHOSPHATE 6.25 MILLIGRAM(S): 80 CAPSULE, EXTENDED RELEASE ORAL at 18:25

## 2019-11-29 RX ADMIN — PANTOPRAZOLE SODIUM 40 MILLIGRAM(S): 20 TABLET, DELAYED RELEASE ORAL at 05:47

## 2019-11-29 RX ADMIN — CARVEDILOL PHOSPHATE 6.25 MILLIGRAM(S): 80 CAPSULE, EXTENDED RELEASE ORAL at 05:46

## 2019-11-29 RX ADMIN — Medication 40 MILLIGRAM(S): at 05:46

## 2019-11-29 RX ADMIN — SERTRALINE 100 MILLIGRAM(S): 25 TABLET, FILM COATED ORAL at 13:45

## 2019-11-29 RX ADMIN — ENOXAPARIN SODIUM 40 MILLIGRAM(S): 100 INJECTION SUBCUTANEOUS at 13:42

## 2019-11-29 RX ADMIN — Medication 81 MILLIGRAM(S): at 13:39

## 2019-11-29 RX ADMIN — Medication 5 MILLIGRAM(S): at 21:31

## 2019-11-29 RX ADMIN — Medication 1 TABLET(S): at 05:46

## 2019-11-29 RX ADMIN — CHLORHEXIDINE GLUCONATE 1 APPLICATION(S): 213 SOLUTION TOPICAL at 05:46

## 2019-11-29 RX ADMIN — Medication 1 TABLET(S): at 18:25

## 2019-11-29 RX ADMIN — LOSARTAN POTASSIUM 50 MILLIGRAM(S): 100 TABLET, FILM COATED ORAL at 05:46

## 2019-11-29 NOTE — PROGRESS NOTE ADULT - SUBJECTIVE AND OBJECTIVE BOX
OPAL MCCARTY    Patient is a 84y old  Female who presents with a chief complaint of CHF exacerbation (28 Nov 2019 10:06)    INTERVAL HPI/OVERNIGHT EVENTS: Pt was transferred from CCU last night. Pt says she feels better, denies SOB or chest pain. Daughter at bedside, sh states to her pt looks much better.    ROS: All ROS negative except generalized weakness    PHYSICAL EXAM:  T(C): 35.7, Max: 35.9 (11-28-19 @ 13:15)  HR: 83 (75 - 86)  BP: 161/86 (106/57 - 161/86)  RR: 16 (16 - 20)  SpO2: 93% (93% - 93%)    GENERAL: NAD, frail woman  PULMONARY/CHEST: No rales, rhonchi, wheezing  CARDIOVASC: Regular rate and rhythm; No murmurs  GI/ABDOMEN: Soft, Nontender, Nondistended; Bowel sounds present  EXTREMITIES:  2+ Peripheral Pulses, No clubbing, cyanosis, or edema, no deformity. No calf tenderness b/l.  NERVOUS SYSTEM:  Alert & Oriented X3, no focal deficit     LABS:                        13.2   10.08 )-----------( 101      ( 29 Nov 2019 06:50 )             40.0     11-29    138  |  96<L>  |  22<H>  ----------------------------<  114<H>  3.6   |  28  |  0.8    Ca    8.6      29 Nov 2019 06:50  Phos  4.0     11-29  Mg     1.9     11-29      Culture - Urine (collected 26 Nov 2019 23:40)  Source: .Urine Clean Catch (Midstream)  Final Report (28 Nov 2019 16:39):    No growth    Culture - Blood (collected 26 Nov 2019 19:50)  Source: .Blood Blood-Peripheral  Preliminary Report (28 Nov 2019 04:01):    No growth to date.    Culture - Blood (collected 26 Nov 2019 19:45)  Source: .Blood Blood-Peripheral  Preliminary Report (28 Nov 2019 04:01):    No growth to date.      MEDICATIONS  (STANDING):  amoxicillin  875 milliGRAM(s)/clavulanate 1 Tablet(s) Oral two times a day  aspirin  chewable 81 milliGRAM(s) Oral daily  carvedilol 6.25 milliGRAM(s) Oral every 12 hours  chlorhexidine 4% Liquid 1 Application(s) Topical two times a day  enoxaparin Injectable 40 milliGRAM(s) SubCutaneous daily  furosemide    Tablet 40 milliGRAM(s) Oral daily  losartan 50 milliGRAM(s) Oral daily  melatonin 5 milliGRAM(s) Oral at bedtime  pantoprazole    Tablet 40 milliGRAM(s) Oral before breakfast  sertraline 100 milliGRAM(s) Oral daily    MEDICATIONS  (PRN):  albuterol/ipratropium for Nebulization 3 milliLiter(s) Nebulizer every 6 hours PRN Shortness of Breath and/or Wheezing

## 2019-11-29 NOTE — CDI QUERY NOTE - NSCDIOTHERTXTBX_GEN_ALL_CORE_HH
Clinical documentation indicates that this patient presented with HFrEF exacerbation. on admission  found in resp distress with O2 sat 80s and elevated BP.  Pt was treated with 2 SLNTG and placed on bipap with improvement.   in ED:  Oxygen Therapy Flow (L/min) Oxygen Flow (L/min): 15 L/Min  Oxygen Concentration (%): 98 %  Oxygen Therapy: Delivery Method Delivery Method: BiPAP/CPAP  RR was up to 33 on O2 supplement.  Documented in ED that pt has respiratory failure as a secondary diagnosis.    In order to accurately capture the diagnosis to the greatest degree of specificity. The documentation in this patient’s medical record requires additional clarification.  Please include more specific diagnosis in your Progress Note and/or Discharge Summary.    • Respiratory failure ruled out  • Respiratory failure ruled in:        Please identify type of Respiratory Failure if known:                    •	Acute  ( Hypoxia / Hypercapnea)                    •	Chronic ( hypoxia or hypercapnea)                    •	Acute on chronic respiratory failure (hypoxia or hypercapnea)  • Other (specify)  • Unknown  	  Present on Admission:  Was the severity of the condition present on admission?  If so, please document in the chart that “(the condition) was present on admission.”    In responding to this request, please exercise your independent professional judgment.  The fact that a question is asked does not imply that any particular answer is desired or expected.    Documentation clarification is required for compliance, accuracy in coding and billing, and reporting severity of illness, quality data   and risk of mortality. Clinical documentation indicates that this patient presented with HFrEF exacerbation. on admission  found in resp distress with O2 sat 80s and elevated BP.  Pt was treated with 2 SLNTG and placed on bipap with improvement. Pt has history of COPD but not on home O2.  in ED:  Oxygen Therapy Flow (L/min) Oxygen Flow (L/min): 15 L/Min  Oxygen Concentration (%): 98 %  Oxygen Therapy: Delivery Method Delivery Method: BiPAP/CPAP  RR was up to 33 on O2 supplement.  Documented in ED that pt has respiratory failure as a secondary diagnosis.    In order to accurately capture the diagnosis to the greatest degree of specificity. The documentation in this patient’s medical record requires additional clarification.  Please include more specific diagnosis in your Progress Note and/or Discharge Summary.    • Respiratory failure ruled out  • Respiratory failure ruled in:        Please identify type of Respiratory Failure if known:                    •	Acute  ( Hypoxia / Hypercapnea)                    •	Chronic ( hypoxia or hypercapnea)                    •	Acute on chronic respiratory failure (hypoxia or hypercapnea)  • Other (specify)  • Unknown  	  Present on Admission:  Was the severity of the condition present on admission?  If so, please document in the chart that “(the condition) was present on admission.”    In responding to this request, please exercise your independent professional judgment.  The fact that a question is asked does not imply that any particular answer is desired or expected.    Documentation clarification is required for compliance, accuracy in coding and billing, and reporting severity of illness, quality data   and risk of mortality.

## 2019-11-30 LAB
ANION GAP SERPL CALC-SCNC: 14 MMOL/L — SIGNIFICANT CHANGE UP (ref 7–14)
BUN SERPL-MCNC: 24 MG/DL — HIGH (ref 10–20)
CALCIUM SERPL-MCNC: 8.3 MG/DL — LOW (ref 8.5–10.1)
CHLORIDE SERPL-SCNC: 97 MMOL/L — LOW (ref 98–110)
CO2 SERPL-SCNC: 28 MMOL/L — SIGNIFICANT CHANGE UP (ref 17–32)
CREAT SERPL-MCNC: 0.8 MG/DL — SIGNIFICANT CHANGE UP (ref 0.7–1.5)
GLUCOSE SERPL-MCNC: 107 MG/DL — HIGH (ref 70–99)
HCT VFR BLD CALC: 39.5 % — SIGNIFICANT CHANGE UP (ref 37–47)
HGB BLD-MCNC: 12.9 G/DL — SIGNIFICANT CHANGE UP (ref 12–16)
MCHC RBC-ENTMCNC: 28.6 PG — SIGNIFICANT CHANGE UP (ref 27–31)
MCHC RBC-ENTMCNC: 32.7 G/DL — SIGNIFICANT CHANGE UP (ref 32–37)
MCV RBC AUTO: 87.6 FL — SIGNIFICANT CHANGE UP (ref 81–99)
NRBC # BLD: 0 /100 WBCS — SIGNIFICANT CHANGE UP (ref 0–0)
PLATELET # BLD AUTO: 96 K/UL — LOW (ref 130–400)
POTASSIUM SERPL-MCNC: 3.5 MMOL/L — SIGNIFICANT CHANGE UP (ref 3.5–5)
POTASSIUM SERPL-SCNC: 3.5 MMOL/L — SIGNIFICANT CHANGE UP (ref 3.5–5)
RBC # BLD: 4.51 M/UL — SIGNIFICANT CHANGE UP (ref 4.2–5.4)
RBC # FLD: 14.3 % — SIGNIFICANT CHANGE UP (ref 11.5–14.5)
SODIUM SERPL-SCNC: 139 MMOL/L — SIGNIFICANT CHANGE UP (ref 135–146)
WBC # BLD: 7.83 K/UL — SIGNIFICANT CHANGE UP (ref 4.8–10.8)
WBC # FLD AUTO: 7.83 K/UL — SIGNIFICANT CHANGE UP (ref 4.8–10.8)

## 2019-11-30 PROCEDURE — 99232 SBSQ HOSP IP/OBS MODERATE 35: CPT

## 2019-11-30 RX ORDER — ACETAMINOPHEN 500 MG
650 TABLET ORAL EVERY 6 HOURS
Refills: 0 | Status: DISCONTINUED | OUTPATIENT
Start: 2019-11-30 | End: 2019-12-02

## 2019-11-30 RX ADMIN — Medication 40 MILLIGRAM(S): at 05:05

## 2019-11-30 RX ADMIN — SERTRALINE 100 MILLIGRAM(S): 25 TABLET, FILM COATED ORAL at 11:43

## 2019-11-30 RX ADMIN — Medication 81 MILLIGRAM(S): at 11:42

## 2019-11-30 RX ADMIN — CARVEDILOL PHOSPHATE 6.25 MILLIGRAM(S): 80 CAPSULE, EXTENDED RELEASE ORAL at 18:10

## 2019-11-30 RX ADMIN — Medication 5 MILLIGRAM(S): at 21:25

## 2019-11-30 RX ADMIN — CHLORHEXIDINE GLUCONATE 1 APPLICATION(S): 213 SOLUTION TOPICAL at 05:07

## 2019-11-30 RX ADMIN — ENOXAPARIN SODIUM 40 MILLIGRAM(S): 100 INJECTION SUBCUTANEOUS at 11:43

## 2019-11-30 RX ADMIN — LOSARTAN POTASSIUM 50 MILLIGRAM(S): 100 TABLET, FILM COATED ORAL at 05:05

## 2019-11-30 RX ADMIN — CHLORHEXIDINE GLUCONATE 1 APPLICATION(S): 213 SOLUTION TOPICAL at 18:10

## 2019-11-30 RX ADMIN — Medication 1 TABLET(S): at 18:10

## 2019-11-30 RX ADMIN — PANTOPRAZOLE SODIUM 40 MILLIGRAM(S): 20 TABLET, DELAYED RELEASE ORAL at 05:05

## 2019-11-30 RX ADMIN — CARVEDILOL PHOSPHATE 6.25 MILLIGRAM(S): 80 CAPSULE, EXTENDED RELEASE ORAL at 05:05

## 2019-11-30 RX ADMIN — Medication 1 TABLET(S): at 05:05

## 2019-11-30 NOTE — PROGRESS NOTE ADULT - SUBJECTIVE AND OBJECTIVE BOX
OPAL MCCARTY    Patient is a 84y old  Female who presents with a chief complaint of CHF exacerbation (29 Nov 2019 10:48)    INTERVAL HPI/OVERNIGHT EVENTS: no events, pt says she feels overall much better.     ROS: All ROS negative except    PHYSICAL EXAM:  T(C): 36.4, Max: 36.4 (11-30-19 @ 06:48)  HR: 66 (66 - 72)  BP: 124/64 (121/60 - 142/63)  RR: 18 (16 - 18)  SpO2: 94% (89% - 94%)    GENERAL: NAD, frail woman  PULMONARY/CHEST: No rales, rhonchi, wheezing  CARDIOVASC: Regular rate and rhythm; No murmurs  GI/ABDOMEN: Soft, Nontender, Nondistended; Bowel sounds present  EXTREMITIES:  2+ Peripheral Pulses, No clubbing, cyanosis, or edema, no deformity. No calf tenderness b/l.  NERVOUS SYSTEM:  Alert & Oriented X3, no focal deficit    LABS:                        12.9   7.83  )-----------( 96       ( 30 Nov 2019 06:42 )             39.5     11-30    139  |  97<L>  |  24<H>  ----------------------------<  107<H>  3.5   |  28  |  0.8    Ca    8.3<L>      30 Nov 2019 06:42  Phos  4.0     11-29  Mg     1.9     11-29      MEDICATIONS  (STANDING):  amoxicillin  875 milliGRAM(s)/clavulanate 1 Tablet(s) Oral two times a day  aspirin  chewable 81 milliGRAM(s) Oral daily  carvedilol 6.25 milliGRAM(s) Oral every 12 hours  chlorhexidine 4% Liquid 1 Application(s) Topical two times a day  enoxaparin Injectable 40 milliGRAM(s) SubCutaneous daily  furosemide    Tablet 40 milliGRAM(s) Oral daily  losartan 50 milliGRAM(s) Oral daily  melatonin 5 milliGRAM(s) Oral at bedtime  pantoprazole    Tablet 40 milliGRAM(s) Oral before breakfast  sertraline 100 milliGRAM(s) Oral daily    MEDICATIONS  (PRN):  acetaminophen   Tablet .. 650 milliGRAM(s) Oral every 6 hours PRN Moderate Pain (4 - 6)  albuterol/ipratropium for Nebulization 3 milliLiter(s) Nebulizer every 6 hours PRN Shortness of Breath and/or Wheezing

## 2019-12-01 PROCEDURE — 99232 SBSQ HOSP IP/OBS MODERATE 35: CPT

## 2019-12-01 RX ORDER — IBUPROFEN 200 MG
400 TABLET ORAL EVERY 6 HOURS
Refills: 0 | Status: DISCONTINUED | OUTPATIENT
Start: 2019-12-01 | End: 2019-12-02

## 2019-12-01 RX ADMIN — Medication 1 TABLET(S): at 17:48

## 2019-12-01 RX ADMIN — PANTOPRAZOLE SODIUM 40 MILLIGRAM(S): 20 TABLET, DELAYED RELEASE ORAL at 06:49

## 2019-12-01 RX ADMIN — Medication 40 MILLIGRAM(S): at 05:33

## 2019-12-01 RX ADMIN — Medication 650 MILLIGRAM(S): at 12:32

## 2019-12-01 RX ADMIN — CHLORHEXIDINE GLUCONATE 1 APPLICATION(S): 213 SOLUTION TOPICAL at 17:48

## 2019-12-01 RX ADMIN — LOSARTAN POTASSIUM 50 MILLIGRAM(S): 100 TABLET, FILM COATED ORAL at 05:34

## 2019-12-01 RX ADMIN — Medication 81 MILLIGRAM(S): at 11:18

## 2019-12-01 RX ADMIN — Medication 1 TABLET(S): at 05:34

## 2019-12-01 RX ADMIN — CARVEDILOL PHOSPHATE 6.25 MILLIGRAM(S): 80 CAPSULE, EXTENDED RELEASE ORAL at 05:33

## 2019-12-01 RX ADMIN — Medication 650 MILLIGRAM(S): at 21:13

## 2019-12-01 RX ADMIN — Medication 5 MILLIGRAM(S): at 21:13

## 2019-12-01 RX ADMIN — Medication 650 MILLIGRAM(S): at 13:30

## 2019-12-01 RX ADMIN — SERTRALINE 100 MILLIGRAM(S): 25 TABLET, FILM COATED ORAL at 11:18

## 2019-12-01 RX ADMIN — CARVEDILOL PHOSPHATE 6.25 MILLIGRAM(S): 80 CAPSULE, EXTENDED RELEASE ORAL at 17:48

## 2019-12-01 RX ADMIN — ENOXAPARIN SODIUM 40 MILLIGRAM(S): 100 INJECTION SUBCUTANEOUS at 11:18

## 2019-12-01 RX ADMIN — CHLORHEXIDINE GLUCONATE 1 APPLICATION(S): 213 SOLUTION TOPICAL at 05:33

## 2019-12-01 NOTE — PROGRESS NOTE ADULT - SUBJECTIVE AND OBJECTIVE BOX
OPAL MCCARTY    Patient is a 84y old  Female who presents with a chief complaint of CHF exacerbation (30 Nov 2019 12:58)    INTERVAL HPI/OVERNIGHT EVENTS: no events overnight, no new complaints    ROS: All ROS negative except generalized weakness    PHYSICAL EXAM:  T(C): 35.8, Max: 36.4 (12-01-19 @ 05:16)  HR: 67 (67 - 68)  BP: 120/58 (106/62 - 136/57)  RR: 18 (16 - 18)  SpO2: --    GENERAL: NAD, frail woman  PULMONARY/CHEST: No rales, rhonchi, wheezing  CARDIOVASC: Regular rate and rhythm; No murmurs  GI/ABDOMEN: Soft, Nontender, Nondistended; Bowel sounds present  EXTREMITIES:  2+ Peripheral Pulses, No clubbing, cyanosis, or edema, no deformity. No calf tenderness b/l.  NERVOUS SYSTEM:  Alert & Oriented X3, no focal deficit    LABS: no new labs today                   MEDICATIONS  (STANDING):  amoxicillin  875 milliGRAM(s)/clavulanate 1 Tablet(s) Oral two times a day  aspirin  chewable 81 milliGRAM(s) Oral daily  carvedilol 6.25 milliGRAM(s) Oral every 12 hours  chlorhexidine 4% Liquid 1 Application(s) Topical two times a day  enoxaparin Injectable 40 milliGRAM(s) SubCutaneous daily  furosemide    Tablet 40 milliGRAM(s) Oral daily  losartan 50 milliGRAM(s) Oral daily  melatonin 5 milliGRAM(s) Oral at bedtime  pantoprazole    Tablet 40 milliGRAM(s) Oral before breakfast  sertraline 100 milliGRAM(s) Oral daily    MEDICATIONS  (PRN):  acetaminophen   Tablet .. 650 milliGRAM(s) Oral every 6 hours PRN Moderate Pain (4 - 6)  albuterol/ipratropium for Nebulization 3 milliLiter(s) Nebulizer every 6 hours PRN Shortness of Breath and/or Wheezing

## 2019-12-02 ENCOUNTER — TRANSCRIPTION ENCOUNTER (OUTPATIENT)
Age: 84
End: 2019-12-02

## 2019-12-02 VITALS
HEART RATE: 60 BPM | RESPIRATION RATE: 16 BRPM | TEMPERATURE: 98 F | SYSTOLIC BLOOD PRESSURE: 113 MMHG | DIASTOLIC BLOOD PRESSURE: 60 MMHG

## 2019-12-02 PROCEDURE — 99239 HOSP IP/OBS DSCHRG MGMT >30: CPT

## 2019-12-02 RX ORDER — FUROSEMIDE 40 MG
1 TABLET ORAL
Qty: 0 | Refills: 0 | DISCHARGE
Start: 2019-12-02

## 2019-12-02 RX ORDER — FUROSEMIDE 40 MG
1 TABLET ORAL
Qty: 0 | Refills: 0 | DISCHARGE

## 2019-12-02 RX ORDER — CARVEDILOL PHOSPHATE 80 MG/1
1 CAPSULE, EXTENDED RELEASE ORAL
Qty: 0 | Refills: 0 | DISCHARGE

## 2019-12-02 RX ORDER — IPRATROPIUM/ALBUTEROL SULFATE 18-103MCG
3 AEROSOL WITH ADAPTER (GRAM) INHALATION
Qty: 0 | Refills: 0 | DISCHARGE
Start: 2019-12-02

## 2019-12-02 RX ORDER — LOSARTAN POTASSIUM 100 MG/1
1 TABLET, FILM COATED ORAL
Qty: 0 | Refills: 0 | DISCHARGE
Start: 2019-12-02

## 2019-12-02 RX ORDER — CARVEDILOL PHOSPHATE 80 MG/1
1 CAPSULE, EXTENDED RELEASE ORAL
Qty: 0 | Refills: 0 | DISCHARGE
Start: 2019-12-02

## 2019-12-02 RX ADMIN — CHLORHEXIDINE GLUCONATE 1 APPLICATION(S): 213 SOLUTION TOPICAL at 17:19

## 2019-12-02 RX ADMIN — Medication 1 TABLET(S): at 17:18

## 2019-12-02 RX ADMIN — Medication 40 MILLIGRAM(S): at 05:20

## 2019-12-02 RX ADMIN — ENOXAPARIN SODIUM 40 MILLIGRAM(S): 100 INJECTION SUBCUTANEOUS at 11:35

## 2019-12-02 RX ADMIN — CHLORHEXIDINE GLUCONATE 1 APPLICATION(S): 213 SOLUTION TOPICAL at 11:35

## 2019-12-02 RX ADMIN — Medication 650 MILLIGRAM(S): at 11:58

## 2019-12-02 RX ADMIN — PANTOPRAZOLE SODIUM 40 MILLIGRAM(S): 20 TABLET, DELAYED RELEASE ORAL at 05:20

## 2019-12-02 RX ADMIN — LOSARTAN POTASSIUM 50 MILLIGRAM(S): 100 TABLET, FILM COATED ORAL at 06:21

## 2019-12-02 RX ADMIN — SERTRALINE 100 MILLIGRAM(S): 25 TABLET, FILM COATED ORAL at 11:35

## 2019-12-02 RX ADMIN — Medication 650 MILLIGRAM(S): at 11:34

## 2019-12-02 RX ADMIN — CARVEDILOL PHOSPHATE 6.25 MILLIGRAM(S): 80 CAPSULE, EXTENDED RELEASE ORAL at 05:20

## 2019-12-02 RX ADMIN — Medication 1 TABLET(S): at 05:20

## 2019-12-02 RX ADMIN — CARVEDILOL PHOSPHATE 6.25 MILLIGRAM(S): 80 CAPSULE, EXTENDED RELEASE ORAL at 17:19

## 2019-12-02 RX ADMIN — Medication 81 MILLIGRAM(S): at 11:35

## 2019-12-02 NOTE — DISCHARGE NOTE PROVIDER - CARE PROVIDERS DIRECT ADDRESSES
,DirectAddress_Unknown,DirectAddress_Unknown,[email or web address]

## 2019-12-02 NOTE — DISCHARGE NOTE PROVIDER - NSDCMRMEDTOKEN_GEN_ALL_CORE_FT
aspirin 81 mg oral delayed release tablet: 1 tab(s) orally once a day  carvedilol 6.25 mg oral tablet: 1 tab(s) orally every 12 hours  furosemide 40 mg oral tablet: 1 tab(s) orally once a day  ipratropium-albuterol 0.5 mg-2.5 mg/3 mLinhalation solution: 3 milliliter(s) inhaled every 6 hours, As needed, Shortness of Breath and/or Wheezing  losartan 50 mg oral tablet: 1 tab(s) orally once a day  melatonin 5 mg oral tablet: 1 tab(s) orally once a day (at bedtime)  Zoloft 100 mg oral tablet: 1 tab(s) orally once a day

## 2019-12-02 NOTE — PROGRESS NOTE ADULT - REASON FOR ADMISSION
CHF exacerbation

## 2019-12-02 NOTE — DISCHARGE NOTE PROVIDER - CARE PROVIDER_API CALL
Taras Hoffman)  Internal Medicine  [address] [place] [address]  Phone: [phone]  Fax: [phone]  Follow Up Time: 2 weeks    Sinan Philip ()  Critical Care Medicine; Pulmonary Disease; Sleep Medicine  [address] [place] [address], [address]  [place] [address]  Phone: [phone]  Fax: [phone]  Follow Up Time: 2 weeks    Forest Marks)  Cardiovascular Disease; Internal Medicine  [address] [place] [address]  Phone: [phone]  Fax: [phone]  Follow Up Time: 2 weeks

## 2019-12-02 NOTE — PROGRESS NOTE ADULT - ASSESSMENT
83yo Female w/ PMH of CHF, COPD not on home O2, HLD, dementia BIBA w/ CC of SOB admitted for CHF exacerbation:      # Acute on chronic CHFrEF:  CXR: Bilateral congestion, BNP: 2500  Last echo: EF25-30%,   Improved status post Lasix  change IV lasix 40 mg daily to oral  downgrade to floor   needs NH placement     # Possible right lower lobe opacity:  will give augmentin x 5 days    # Troponinemia:  No chest pain, EKG shows LBBB which is old  type 2 NSTEMI  medical mgmt as per cardio      #HTN  - c/w home meds    # COPD  - duonebs PRN      #DVT prophylaxis: lovenox    # Code: DNR/DNI
83yo Female w/ PMH of CHrEF, COPD not on home O2, severe JAVI not compliant with BiPAP, HLD, dementia BIBA w/ CC of SOB and legs swelling. Pt was admitted to CCU for management of CHF exacerbation and then transferred to [facility].    # Acute respiratory failure due to acute on chronic HFrEF:  - CXR: Bilateral congestion, BNP: 2500  - echo: EF moderately impaired LV function, 35-44%   - Improved status post IV Lasix, now on Lasix 40 mg PO  - cont BB, started ARBs     # Possible right lower lobe opacity:  - pt has been on Augmentin, last day 12/2    # NSTEMI type 2  No chest pain, EKG shows LBBB which is old  medical mgmt as per cardio    #HTN  - c/w home meds    # COPD  - duonebs PRN    # Severe JAVI - pt is not compliant with CPAP, she can't tolerate it    #DVT prophylaxis: lovenox    # Code: DNR/DNI    Pt needs STR, once arranged pt can be discharged. Spoke to daughter, she agreed with the plan
83yo Female w/ PMH of CHrEF, COPD not on home O2, severe JAVI not compliant with BiPAP, HLD, dementia BIBA w/ CC of SOB and legs swelling. Pt was admitted to CCU for management of CHF exacerbation and then transferred to [facility].    # Acute respiratory failure due to acute on chronic HFrEF:  - CXR: Bilateral congestion, BNP: 2500  - echo: EF moderately impaired LV function, 35-44%   - Improved status post IV Lasix, now on Lasix 40 mg PO  - cont BB, started ARBs     # Possible right lower lobe opacity:  - pt has been on Augmentin, last day 12/2    # NSTEMI type 2  No chest pain, EKG shows LBBB which is old  medical mgmt as per cardio    #HTN  - c/w home meds    # COPD  - duonebs PRN    # Severe JAVI - pt is not compliant with CPAP, she can't tolerate it    #DVT prophylaxis: lovenox    # Code: DNR/DNI    Pt needs STR, once arranged pt can be discharged. Spoke to daughter, she agreed with the plan
85 yo Female w/ PMH of CHrEF, COPD not on home O2, severe JAVI not compliant with BiPAP, HLD, dementia BIBA w/ CC of SOB and legs swelling. Pt was admitted to CCU for management of CHF exacerbation and then transferred to [facility].    # Acute respiratory failure due to acute on chronic HFrEF:  - CXR: Bilateral congestion, BNP: 2500  - echo: EF moderately impaired LV function, 35-44%   - Improved status post IV Lasix, now on Lasix 40 mg PO will continue  - cont BB, started ARBs     # Possible right lower lobe opacity:  - pt has been on Augmentin, last day 12/2    # NSTEMI type 2  No chest pain, EKG shows LBBB which is old  medical mgmt as per cardio    #HTN  - c/w home meds    # COPD  - duonebs PRN    # Severe JAIV - pt is not compliant with CPAP, she can't tolerate it    # DVT prophylaxis: lovenox    # Code: DNR/DNI    Pt needs STR, once arranged pt can be discharged. Spoke to daughter, she agreed with the plan
85yo Female w/ PMH of CHrEF, COPD not on home O2, severe JAVI not compliant with BiPAP, HLD, dementia BIBA w/ CC of SOB and legs swelling. Pt was admitted to CCU for management of CHF exacerbation.    # Acute respiratory failure due to acute on chronic HFrEF:  - CXR: Bilateral congestion, BNP: 2500  - echo: EF moderately impaired LV function, 35-44%   - Improved status post IV Lasix, now on Lasix 40 mg PO  - cont BB, started ARBs     # Possible right lower lobe opacity:  - pt has been on Augmentin, course 5 days    # NSTEMI type 2  No chest pain, EKG shows LBBB which is old  medical mgmt as per cardio      #HTN  - c/w home meds    # COPD  - duonebs PRN    # Severe JAVI - pt is not compliant with CPAP, she can't tolerate it    #DVT prophylaxis: lovenox    # Code: DNR/DNI    Pt needs STR, once arranged pt can be discharged. Spoke to daughter, she agreed with the plan
IMPRESSION:    Acute on chronic HFrEF   Possible CAP   COPD - no exacerbation   Chronic HCRF  Dementia     SUGGEST:      CNS: Avoid sedatives     HEENT: Oral care    PULMONARY:  HOB @ 45 degrees, Augmentin x 5 days, prn bipap   cxr today     CARDIOVASCULAR: Keep map > 65,   Diurese 40 IV daily ,   coreg and asa,   monitor prolonged qtc     GI: GI prophylaxis.  Feeding     RENAL:  Follow up lytes.  Correct as needed, strict I/O     INFECTIOUS DISEASE: Follow up cultures    HEMATOLOGICAL:  DVT prophylaxis.    ENDOCRINE:  Follow up FS.  Insulin protocol if needed.    MUSCULOSKELETAL: OOB to chair     Lines: peripherals   [place]: Primaphed   Code status: Full   Prognosis: Guarded   Dispo: Tele follow with cardiology if can be downgrade to floor   DNR,DNI
Patient without complaints dementia. Chf better . Troponin mildly elevated Type 2 mi. Now DNR/ DNI. Medical rx. Lv impaired. Begin low dose arb . Prognosis guarded
83yo Female w/ PMH of CHF, COPD not on home O2, HLD, dementia BIBA w/ CC of SOB admitted for CHF exacerbation:      # Acute on chronic CHFrEF:  CXR: Bilateral congestion, BNP: 2500  Last echo: EF25-30%, will repeat echo here  Improved status post lasix  Will give lasix 40 mg daily for now  Strict I&Os. Keep I < O  F/u cardiology recommendation.    # Possible right lower lobe opacity:  Given the fever and leukocytosis, will treat as CAP  will give augmentin x 5 days    # Troponinemia:  No chest pain, EKG shows LBBB which is old  Trend 1 more set  Cardiology following      #HTN  - c/w home meds    # COPD  - duonebs PRN      #DVT prophylaxis: lovenox  #GI prophylaxis: PPI  #Diet: DASH Diet    # Code: DNR/DNI

## 2019-12-02 NOTE — DISCHARGE NOTE PROVIDER - NSDCFUSCHEDAPPT_GEN_ALL_CORE_FT
OPAL MCCARTY ; 01/27/2020 ; NPP Neurology [address] [facility] OPAL MCCARTY ; 01/27/2020 ; NPP Neurology [address] [facility]

## 2019-12-02 NOTE — DISCHARGE NOTE PROVIDER - NSDCCPCAREPLAN_GEN_ALL_CORE_FT
PRINCIPAL DISCHARGE DIAGNOSIS  Diagnosis: Acute exacerbation of CHF (congestive heart failure)  Assessment and Plan of Treatment: continue mediactions, follow up with primary cardiologist      SECONDARY DISCHARGE DIAGNOSES  Diagnosis: History of COPD  Assessment and Plan of Treatment: continue inhalers and follow up with Pulm    Diagnosis: JAVI (obstructive sleep apnea)  Assessment and Plan of Treatment: use BiPAP at night if you can tolerate, follow with primary pulmonary    Diagnosis: Pneumonia  Assessment and Plan of Treatment: you completed the treatment    Diagnosis: Respiratory failure  Assessment and Plan of Treatment: use O2 supplement as needed

## 2019-12-02 NOTE — PROGRESS NOTE ADULT - SUBJECTIVE AND OBJECTIVE BOX
OPAL MCCARTY    Patient is a 84y old  Female who presents with a chief complaint of CHF exacerbation (01 Dec 2019 14:07)    INTERVAL HPI/OVERNIGHT EVENTS: no events overnight, no new complaints. Pt states she feels fine, daughter at bedside confirms pt looks at baseline.     PHYSICAL EXAM:  T(C): 36.4, Max: 36.6 (12-01-19 @ 17:35)  HR: 60 (60 - 67)  BP: 113/60 (110/68 - 123/60)  RR: 16 (14 - 16)  SpO2: 86% (86% - 94%) on RA, 94% on 2L NC    GENERAL: NAD, frail woman  PULMONARY/CHEST: No rales, rhonchi, wheezing  CARDIOVASC: Regular rate and rhythm; No murmurs  GI/ABDOMEN: Soft, Nontender, Nondistended; Bowel sounds present  EXTREMITIES:  2+ Peripheral Pulses, No clubbing, cyanosis, or edema, no deformity. No calf tenderness b/l.  NERVOUS SYSTEM:  Alert & Oriented X3, no focal deficit    LABS: no new labs today    MEDICATIONS  (STANDING):  amoxicillin  875 milliGRAM(s)/clavulanate 1 Tablet(s) Oral two times a day  aspirin  chewable 81 milliGRAM(s) Oral daily  carvedilol 6.25 milliGRAM(s) Oral every 12 hours  chlorhexidine 4% Liquid 1 Application(s) Topical two times a day  enoxaparin Injectable 40 milliGRAM(s) SubCutaneous daily  furosemide    Tablet 40 milliGRAM(s) Oral daily  losartan 50 milliGRAM(s) Oral daily  melatonin 5 milliGRAM(s) Oral at bedtime  pantoprazole    Tablet 40 milliGRAM(s) Oral before breakfast  sertraline 100 milliGRAM(s) Oral daily    MEDICATIONS  (PRN):  acetaminophen   Tablet .. 650 milliGRAM(s) Oral every 6 hours PRN Moderate Pain (4 - 6)  albuterol/ipratropium for Nebulization 3 milliLiter(s) Nebulizer every 6 hours PRN Shortness of Breath and/or Wheezing  ibuprofen  Tablet. 400 milliGRAM(s) Oral every 6 hours PRN Moderate Pain (4 - 6)

## 2019-12-02 NOTE — DISCHARGE NOTE NURSING/CASE MANAGEMENT/SOCIAL WORK - PATIENT PORTAL LINK FT
You can access the FollowMyHealth Patient Portal offered by [facility] by registering at the following website: [email or web address]. By joining [facility]’s FollowMyHealth portal, you will also be able to view your health information using other applications (apps) compatible with our system.

## 2019-12-02 NOTE — DISCHARGE NOTE PROVIDER - PROVIDER TOKENS
PROVIDER:[TOKEN:[[id number]],FOLLOWUP:[2 weeks]],PROVIDER:[TOKEN:[[id number]],FOLLOWUP:[2 weeks]],PROVIDER:[TOKEN:[[id number]],FOLLOWUP:[2 weeks]]

## 2019-12-02 NOTE — DISCHARGE NOTE PROVIDER - INSTRUCTIONS
DASH, consistent carbs diet, assist with meals DASH, consistent carbs diet, assist with meals. Restrict fluid up to 1L a day

## 2019-12-02 NOTE — DISCHARGE NOTE PROVIDER - HOSPITAL COURSE
83 yo Female w/ PMH of CHrEF, COPD not on home O2, severe JAVI not compliant with BiPAP, HLD, dementia BIBA w/ CC of SOB and legs swelling. Pt was admitted to CCU for management of CHF exacerbation and then transferred to [facility].        # Acute respiratory failure due to acute on chronic HFrEF:    - CXR: Bilateral congestion, BNP: 2500    - echo: EF moderately impaired LV function, 35-44%     - Improved status post IV Lasix, now on Lasix 40 mg PO will continue    - cont BB, started ARBs     - O2 PRN        # Possible right lower lobe opacity:    - completed Augmentin        # NSTEMI type 2    No chest pain, EKG shows LBBB which is old    medical mgmt as per cardio        #HTN - c/w home meds        # COPD - duonebs PRN        # Severe JAVI - pt is not compliant with CPAP, she can't tolerate it        DNR/DNI        Pt needs STR, once arranged pt can be discharged. Spoke to daughter, she agreed with the plan

## 2019-12-03 ENCOUNTER — OUTPATIENT (OUTPATIENT)
Dept: OUTPATIENT SERVICES | Facility: HOSPITAL | Age: 84
LOS: 1 days | Discharge: HOME | End: 2019-12-03

## 2019-12-03 DIAGNOSIS — Z98.890 OTHER SPECIFIED POSTPROCEDURAL STATES: Chronic | ICD-10-CM

## 2019-12-03 DIAGNOSIS — Z90.49 ACQUIRED ABSENCE OF OTHER SPECIFIED PARTS OF DIGESTIVE TRACT: Chronic | ICD-10-CM

## 2019-12-03 DIAGNOSIS — D64.9 ANEMIA, UNSPECIFIED: ICD-10-CM

## 2019-12-04 ENCOUNTER — OUTPATIENT (OUTPATIENT)
Dept: OUTPATIENT SERVICES | Facility: HOSPITAL | Age: 84
LOS: 1 days | Discharge: HOME | End: 2019-12-04

## 2019-12-04 DIAGNOSIS — Z98.890 OTHER SPECIFIED POSTPROCEDURAL STATES: Chronic | ICD-10-CM

## 2019-12-04 DIAGNOSIS — Z90.49 ACQUIRED ABSENCE OF OTHER SPECIFIED PARTS OF DIGESTIVE TRACT: Chronic | ICD-10-CM

## 2019-12-04 DIAGNOSIS — R79.9 ABNORMAL FINDING OF BLOOD CHEMISTRY, UNSPECIFIED: ICD-10-CM

## 2019-12-05 DIAGNOSIS — Z91.19 PATIENT'S NONCOMPLIANCE WITH OTHER MEDICAL TREATMENT AND REGIMEN: ICD-10-CM

## 2019-12-05 DIAGNOSIS — R26.9 UNSPECIFIED ABNORMALITIES OF GAIT AND MOBILITY: ICD-10-CM

## 2019-12-05 DIAGNOSIS — R06.02 SHORTNESS OF BREATH: ICD-10-CM

## 2019-12-05 DIAGNOSIS — J96.01 ACUTE RESPIRATORY FAILURE WITH HYPOXIA: ICD-10-CM

## 2019-12-05 DIAGNOSIS — I21.A1 MYOCARDIAL INFARCTION TYPE 2: ICD-10-CM

## 2019-12-05 DIAGNOSIS — I11.0 HYPERTENSIVE HEART DISEASE WITH HEART FAILURE: ICD-10-CM

## 2019-12-05 DIAGNOSIS — I44.7 LEFT BUNDLE-BRANCH BLOCK, UNSPECIFIED: ICD-10-CM

## 2019-12-05 DIAGNOSIS — F03.90 UNSPECIFIED DEMENTIA WITHOUT BEHAVIORAL DISTURBANCE: ICD-10-CM

## 2019-12-05 DIAGNOSIS — J18.9 PNEUMONIA, UNSPECIFIED ORGANISM: ICD-10-CM

## 2019-12-05 DIAGNOSIS — G47.33 OBSTRUCTIVE SLEEP APNEA (ADULT) (PEDIATRIC): ICD-10-CM

## 2019-12-05 DIAGNOSIS — Z87.891 PERSONAL HISTORY OF NICOTINE DEPENDENCE: ICD-10-CM

## 2019-12-05 DIAGNOSIS — I50.23 ACUTE ON CHRONIC SYSTOLIC (CONGESTIVE) HEART FAILURE: ICD-10-CM

## 2019-12-05 DIAGNOSIS — J44.0 CHRONIC OBSTRUCTIVE PULMONARY DISEASE WITH (ACUTE) LOWER RESPIRATORY INFECTION: ICD-10-CM

## 2019-12-05 DIAGNOSIS — E78.5 HYPERLIPIDEMIA, UNSPECIFIED: ICD-10-CM

## 2019-12-05 DIAGNOSIS — R53.81 OTHER MALAISE: ICD-10-CM

## 2019-12-05 DIAGNOSIS — R79.89 OTHER SPECIFIED ABNORMAL FINDINGS OF BLOOD CHEMISTRY: ICD-10-CM

## 2019-12-05 DIAGNOSIS — Z66 DO NOT RESUSCITATE: ICD-10-CM

## 2019-12-19 ENCOUNTER — OUTPATIENT (OUTPATIENT)
Dept: OUTPATIENT SERVICES | Facility: HOSPITAL | Age: 84
LOS: 1 days | Discharge: HOME | End: 2019-12-19

## 2019-12-19 ENCOUNTER — APPOINTMENT (OUTPATIENT)
Dept: CARDIOLOGY | Facility: CLINIC | Age: 84
End: 2019-12-19
Payer: MEDICARE

## 2019-12-19 VITALS
DIASTOLIC BLOOD PRESSURE: 78 MMHG | BODY MASS INDEX: 35.85 KG/M2 | HEART RATE: 62 BPM | SYSTOLIC BLOOD PRESSURE: 130 MMHG | HEIGHT: 64 IN | WEIGHT: 210 LBS

## 2019-12-19 DIAGNOSIS — Z98.890 OTHER SPECIFIED POSTPROCEDURAL STATES: Chronic | ICD-10-CM

## 2019-12-19 DIAGNOSIS — I10 ESSENTIAL (PRIMARY) HYPERTENSION: ICD-10-CM

## 2019-12-19 DIAGNOSIS — Z90.49 ACQUIRED ABSENCE OF OTHER SPECIFIED PARTS OF DIGESTIVE TRACT: Chronic | ICD-10-CM

## 2019-12-19 PROCEDURE — 93000 ELECTROCARDIOGRAM COMPLETE: CPT

## 2019-12-19 PROCEDURE — 99214 OFFICE O/P EST MOD 30 MIN: CPT

## 2019-12-19 RX ORDER — MELATONIN 5 MG
5 CAPSULE ORAL
Qty: 30 | Refills: 0 | Status: ACTIVE | COMMUNITY

## 2019-12-19 RX ORDER — SERTRALINE HYDROCHLORIDE 100 MG/1
100 TABLET, FILM COATED ORAL DAILY
Refills: 0 | Status: ACTIVE | COMMUNITY

## 2019-12-19 RX ORDER — LOSARTAN POTASSIUM 50 MG/1
50 TABLET, FILM COATED ORAL DAILY
Qty: 30 | Refills: 6 | Status: ACTIVE | COMMUNITY

## 2019-12-19 NOTE — PHYSICAL EXAM
[General Appearance - Well Developed] : well developed [Normal Appearance] : normal appearance [General Appearance - Well Nourished] : well nourished [Well Groomed] : well groomed [General Appearance - In No Acute Distress] : no acute distress [No Deformities] : no deformities [Normal Conjunctiva] : the conjunctiva exhibited no abnormalities [Eyelids - No Xanthelasma] : the eyelids demonstrated no xanthelasmas [No Oral Pallor] : no oral pallor [Normal Oral Mucosa] : normal oral mucosa [No Oral Cyanosis] : no oral cyanosis [Normal Jugular Venous A Waves Present] : normal jugular venous A waves present [Normal Jugular Venous V Waves Present] : normal jugular venous V waves present [Heart Rate And Rhythm] : heart rate and rhythm were normal [No Jugular Venous King A Waves] : no jugular venous king A waves [Murmurs] : no murmurs present [Heart Sounds] : normal S1 and S2 [Respiration, Rhythm And Depth] : normal respiratory rhythm and effort [Exaggerated Use Of Accessory Muscles For Inspiration] : no accessory muscle use [Auscultation Breath Sounds / Voice Sounds] : lungs were clear to auscultation bilaterally [Abdomen Soft] : soft [Abdomen Tenderness] : non-tender [Abdomen Mass (___ Cm)] : no abdominal mass palpated [Abnormal Walk] : normal gait [Gait - Sufficient For Exercise Testing] : the gait was sufficient for exercise testing [Cyanosis, Localized] : no localized cyanosis [Nail Clubbing] : no clubbing of the fingernails [Petechial Hemorrhages (___cm)] : no petechial hemorrhages [Skin Color & Pigmentation] : normal skin color and pigmentation [Skin Lesions] : no skin lesions [] : no rash [No Venous Stasis] : no venous stasis [No Skin Ulcers] : no skin ulcer [Oriented To Time, Place, And Person] : oriented to person, place, and time [No Xanthoma] : no  xanthoma was observed [Mood] : the mood was normal [Affect] : the affect was normal [No Anxiety] : not feeling anxious

## 2019-12-20 DIAGNOSIS — N39.9 DISORDER OF URINARY SYSTEM, UNSPECIFIED: ICD-10-CM

## 2019-12-24 ENCOUNTER — OUTPATIENT (OUTPATIENT)
Dept: OUTPATIENT SERVICES | Facility: HOSPITAL | Age: 84
LOS: 1 days | Discharge: HOME | End: 2019-12-24

## 2019-12-24 DIAGNOSIS — Z90.49 ACQUIRED ABSENCE OF OTHER SPECIFIED PARTS OF DIGESTIVE TRACT: Chronic | ICD-10-CM

## 2019-12-24 DIAGNOSIS — R30.0 DYSURIA: ICD-10-CM

## 2019-12-24 DIAGNOSIS — Z98.890 OTHER SPECIFIED POSTPROCEDURAL STATES: Chronic | ICD-10-CM

## 2020-01-06 ENCOUNTER — INPATIENT (INPATIENT)
Facility: HOSPITAL | Age: 85
LOS: 9 days | Discharge: NURSING, REHAB OR LONG-TERM CARE | End: 2020-01-16
Attending: INTERNAL MEDICINE | Admitting: INTERNAL MEDICINE
Payer: MEDICARE

## 2020-01-06 VITALS
HEART RATE: 116 BPM | SYSTOLIC BLOOD PRESSURE: 150 MMHG | OXYGEN SATURATION: 92 % | TEMPERATURE: 101 F | DIASTOLIC BLOOD PRESSURE: 78 MMHG | RESPIRATION RATE: 22 BRPM

## 2020-01-06 DIAGNOSIS — Z90.49 ACQUIRED ABSENCE OF OTHER SPECIFIED PARTS OF DIGESTIVE TRACT: Chronic | ICD-10-CM

## 2020-01-06 DIAGNOSIS — Z98.890 OTHER SPECIFIED POSTPROCEDURAL STATES: Chronic | ICD-10-CM

## 2020-01-06 LAB
ALBUMIN SERPL ELPH-MCNC: 3.9 G/DL — SIGNIFICANT CHANGE UP (ref 3.5–5.2)
ALP SERPL-CCNC: 55 U/L — SIGNIFICANT CHANGE UP (ref 30–115)
ALT FLD-CCNC: 23 U/L — SIGNIFICANT CHANGE UP (ref 0–41)
ANION GAP SERPL CALC-SCNC: 22 MMOL/L — HIGH (ref 7–14)
APPEARANCE UR: ABNORMAL
AST SERPL-CCNC: 35 U/L — SIGNIFICANT CHANGE UP (ref 0–41)
BACTERIA # UR AUTO: NEGATIVE — SIGNIFICANT CHANGE UP
BASE EXCESS BLDV CALC-SCNC: 4.1 MMOL/L — HIGH (ref -2–2)
BASOPHILS # BLD AUTO: 0.37 K/UL — HIGH (ref 0–0.2)
BASOPHILS NFR BLD AUTO: 1.7 % — HIGH (ref 0–1)
BILIRUB SERPL-MCNC: 1.2 MG/DL — SIGNIFICANT CHANGE UP (ref 0.2–1.2)
BILIRUB UR-MCNC: NEGATIVE — SIGNIFICANT CHANGE UP
BUN SERPL-MCNC: 10 MG/DL — SIGNIFICANT CHANGE UP (ref 10–20)
CA-I SERPL-SCNC: 1.09 MMOL/L — LOW (ref 1.12–1.3)
CALCIUM SERPL-MCNC: 8.8 MG/DL — SIGNIFICANT CHANGE UP (ref 8.5–10.1)
CHLORIDE SERPL-SCNC: 94 MMOL/L — LOW (ref 98–110)
CO2 SERPL-SCNC: 23 MMOL/L — SIGNIFICANT CHANGE UP (ref 17–32)
COLOR SPEC: YELLOW — SIGNIFICANT CHANGE UP
CREAT SERPL-MCNC: 1 MG/DL — SIGNIFICANT CHANGE UP (ref 0.7–1.5)
DIFF PNL FLD: ABNORMAL
EOSINOPHIL # BLD AUTO: 0.37 K/UL — SIGNIFICANT CHANGE UP (ref 0–0.7)
EOSINOPHIL NFR BLD AUTO: 1.7 % — SIGNIFICANT CHANGE UP (ref 0–8)
EPI CELLS # UR: 4 /HPF — SIGNIFICANT CHANGE UP (ref 0–5)
FLU A RESULT: NEGATIVE — SIGNIFICANT CHANGE UP
FLU A RESULT: NEGATIVE — SIGNIFICANT CHANGE UP
FLUAV AG NPH QL: NEGATIVE — SIGNIFICANT CHANGE UP
FLUBV AG NPH QL: NEGATIVE — SIGNIFICANT CHANGE UP
GAS PNL BLDV: 140 MMOL/L — SIGNIFICANT CHANGE UP (ref 136–145)
GAS PNL BLDV: SIGNIFICANT CHANGE UP
GAS PNL BLDV: SIGNIFICANT CHANGE UP
GIANT PLATELETS BLD QL SMEAR: PRESENT — SIGNIFICANT CHANGE UP
GLUCOSE SERPL-MCNC: 268 MG/DL — HIGH (ref 70–99)
GLUCOSE UR QL: NEGATIVE — SIGNIFICANT CHANGE UP
HCO3 BLDV-SCNC: 31 MMOL/L — HIGH (ref 22–29)
HCT VFR BLD CALC: 46.6 % — SIGNIFICANT CHANGE UP (ref 37–47)
HCT VFR BLDA CALC: 41.7 % — SIGNIFICANT CHANGE UP (ref 34–44)
HGB BLD CALC-MCNC: 13.6 G/DL — LOW (ref 14–18)
HGB BLD-MCNC: 15.5 G/DL — SIGNIFICANT CHANGE UP (ref 12–16)
HOROWITZ INDEX BLDV+IHG-RTO: 40 — SIGNIFICANT CHANGE UP
HYALINE CASTS # UR AUTO: 11 /LPF — HIGH (ref 0–7)
KETONES UR-MCNC: SIGNIFICANT CHANGE UP
LACTATE BLDV-MCNC: 2.3 MMOL/L — HIGH (ref 0.5–1.6)
LEUKOCYTE ESTERASE UR-ACNC: NEGATIVE — SIGNIFICANT CHANGE UP
LYMPHOCYTES # BLD AUTO: 28.5 % — SIGNIFICANT CHANGE UP (ref 20.5–51.1)
LYMPHOCYTES # BLD AUTO: 6.19 K/UL — HIGH (ref 1.2–3.4)
MAGNESIUM SERPL-MCNC: 1.9 MG/DL — SIGNIFICANT CHANGE UP (ref 1.8–2.4)
MANUAL SMEAR VERIFICATION: SIGNIFICANT CHANGE UP
MCHC RBC-ENTMCNC: 28.8 PG — SIGNIFICANT CHANGE UP (ref 27–31)
MCHC RBC-ENTMCNC: 33.3 G/DL — SIGNIFICANT CHANGE UP (ref 32–37)
MCV RBC AUTO: 86.6 FL — SIGNIFICANT CHANGE UP (ref 81–99)
MONOCYTES # BLD AUTO: 1.13 K/UL — HIGH (ref 0.1–0.6)
MONOCYTES NFR BLD AUTO: 5.2 % — SIGNIFICANT CHANGE UP (ref 1.7–9.3)
NEUTROPHILS # BLD AUTO: 12.16 K/UL — HIGH (ref 1.4–6.5)
NEUTROPHILS NFR BLD AUTO: 56 % — SIGNIFICANT CHANGE UP (ref 42.2–75.2)
NITRITE UR-MCNC: NEGATIVE — SIGNIFICANT CHANGE UP
NT-PROBNP SERPL-SCNC: 3970 PG/ML — HIGH (ref 0–300)
PCO2 BLDV: 55 MMHG — HIGH (ref 41–51)
PH BLDV: 7.36 — SIGNIFICANT CHANGE UP (ref 7.26–7.43)
PH UR: 6 — SIGNIFICANT CHANGE UP (ref 5–8)
PLAT MORPH BLD: NORMAL — SIGNIFICANT CHANGE UP
PLATELET # BLD AUTO: 175 K/UL — SIGNIFICANT CHANGE UP (ref 130–400)
PO2 BLDV: 40 MMHG — SIGNIFICANT CHANGE UP (ref 20–40)
POIKILOCYTOSIS BLD QL AUTO: SIGNIFICANT CHANGE UP
POTASSIUM BLDV-SCNC: 2.7 MMOL/L — LOW (ref 3.3–5.6)
POTASSIUM SERPL-MCNC: 3.6 MMOL/L — SIGNIFICANT CHANGE UP (ref 3.5–5)
POTASSIUM SERPL-SCNC: 3.6 MMOL/L — SIGNIFICANT CHANGE UP (ref 3.5–5)
PROT SERPL-MCNC: 8.4 G/DL — HIGH (ref 6–8)
PROT UR-MCNC: ABNORMAL
RBC # BLD: 5.38 M/UL — SIGNIFICANT CHANGE UP (ref 4.2–5.4)
RBC # FLD: 14.6 % — HIGH (ref 11.5–14.5)
RBC BLD AUTO: ABNORMAL
RBC CASTS # UR COMP ASSIST: 10 /HPF — HIGH (ref 0–4)
RSV RESULT: NEGATIVE — SIGNIFICANT CHANGE UP
RSV RNA RESP QL NAA+PROBE: NEGATIVE — SIGNIFICANT CHANGE UP
SAO2 % BLDV: 72 % — SIGNIFICANT CHANGE UP
SMUDGE CELLS # BLD: PRESENT — SIGNIFICANT CHANGE UP
SODIUM SERPL-SCNC: 139 MMOL/L — SIGNIFICANT CHANGE UP (ref 135–146)
SP GR SPEC: 1.02 — SIGNIFICANT CHANGE UP (ref 1.01–1.02)
TROPONIN T SERPL-MCNC: <0.01 NG/ML — SIGNIFICANT CHANGE UP
UROBILINOGEN FLD QL: ABNORMAL
VARIANT LYMPHS # BLD: 6.9 % — HIGH (ref 0–5)
WBC # BLD: 21.72 K/UL — HIGH (ref 4.8–10.8)
WBC # FLD AUTO: 21.72 K/UL — HIGH (ref 4.8–10.8)
WBC UR QL: 3 /HPF — SIGNIFICANT CHANGE UP (ref 0–5)

## 2020-01-06 PROCEDURE — 99291 CRITICAL CARE FIRST HOUR: CPT

## 2020-01-06 PROCEDURE — 71045 X-RAY EXAM CHEST 1 VIEW: CPT | Mod: 26,77

## 2020-01-06 PROCEDURE — 93010 ELECTROCARDIOGRAM REPORT: CPT

## 2020-01-06 PROCEDURE — 99223 1ST HOSP IP/OBS HIGH 75: CPT

## 2020-01-06 PROCEDURE — 71045 X-RAY EXAM CHEST 1 VIEW: CPT | Mod: 26

## 2020-01-06 RX ORDER — HEPARIN SODIUM 5000 [USP'U]/ML
5000 INJECTION INTRAVENOUS; SUBCUTANEOUS EVERY 12 HOURS
Refills: 0 | Status: DISCONTINUED | OUTPATIENT
Start: 2020-01-06 | End: 2020-01-14

## 2020-01-06 RX ORDER — ACETAMINOPHEN 500 MG
650 TABLET ORAL ONCE
Refills: 0 | Status: COMPLETED | OUTPATIENT
Start: 2020-01-06 | End: 2020-01-06

## 2020-01-06 RX ORDER — CEFEPIME 1 G/1
2000 INJECTION, POWDER, FOR SOLUTION INTRAMUSCULAR; INTRAVENOUS ONCE
Refills: 0 | Status: DISCONTINUED | OUTPATIENT
Start: 2020-01-06 | End: 2020-01-06

## 2020-01-06 RX ORDER — TIOTROPIUM BROMIDE 18 UG/1
1 CAPSULE ORAL; RESPIRATORY (INHALATION) DAILY
Refills: 0 | Status: DISCONTINUED | OUTPATIENT
Start: 2020-01-06 | End: 2020-01-16

## 2020-01-06 RX ORDER — FUROSEMIDE 40 MG
40 TABLET ORAL
Refills: 0 | Status: DISCONTINUED | OUTPATIENT
Start: 2020-01-06 | End: 2020-01-06

## 2020-01-06 RX ORDER — CEFEPIME 1 G/1
2000 INJECTION, POWDER, FOR SOLUTION INTRAMUSCULAR; INTRAVENOUS EVERY 12 HOURS
Refills: 0 | Status: DISCONTINUED | OUTPATIENT
Start: 2020-01-06 | End: 2020-01-06

## 2020-01-06 RX ORDER — CARVEDILOL PHOSPHATE 80 MG/1
6.25 CAPSULE, EXTENDED RELEASE ORAL EVERY 12 HOURS
Refills: 0 | Status: DISCONTINUED | OUTPATIENT
Start: 2020-01-06 | End: 2020-01-16

## 2020-01-06 RX ORDER — AZITHROMYCIN 500 MG/1
500 TABLET, FILM COATED ORAL EVERY 24 HOURS
Refills: 0 | Status: DISCONTINUED | OUTPATIENT
Start: 2020-01-06 | End: 2020-01-08

## 2020-01-06 RX ORDER — SODIUM CHLORIDE 9 MG/ML
500 INJECTION, SOLUTION INTRAVENOUS ONCE
Refills: 0 | Status: COMPLETED | OUTPATIENT
Start: 2020-01-06 | End: 2020-01-06

## 2020-01-06 RX ORDER — IPRATROPIUM/ALBUTEROL SULFATE 18-103MCG
3 AEROSOL WITH ADAPTER (GRAM) INHALATION EVERY 6 HOURS
Refills: 0 | Status: DISCONTINUED | OUTPATIENT
Start: 2020-01-06 | End: 2020-01-16

## 2020-01-06 RX ORDER — CEFTRIAXONE 500 MG/1
1000 INJECTION, POWDER, FOR SOLUTION INTRAMUSCULAR; INTRAVENOUS ONCE
Refills: 0 | Status: DISCONTINUED | OUTPATIENT
Start: 2020-01-06 | End: 2020-01-06

## 2020-01-06 RX ORDER — CEFEPIME 1 G/1
1000 INJECTION, POWDER, FOR SOLUTION INTRAMUSCULAR; INTRAVENOUS EVERY 12 HOURS
Refills: 0 | Status: DISCONTINUED | OUTPATIENT
Start: 2020-01-06 | End: 2020-01-08

## 2020-01-06 RX ORDER — SERTRALINE 25 MG/1
100 TABLET, FILM COATED ORAL DAILY
Refills: 0 | Status: DISCONTINUED | OUTPATIENT
Start: 2020-01-06 | End: 2020-01-16

## 2020-01-06 RX ORDER — LOSARTAN POTASSIUM 100 MG/1
50 TABLET, FILM COATED ORAL DAILY
Refills: 0 | Status: DISCONTINUED | OUTPATIENT
Start: 2020-01-06 | End: 2020-01-16

## 2020-01-06 RX ORDER — VANCOMYCIN HCL 1 G
1000 VIAL (EA) INTRAVENOUS ONCE
Refills: 0 | Status: COMPLETED | OUTPATIENT
Start: 2020-01-06 | End: 2020-01-06

## 2020-01-06 RX ORDER — FUROSEMIDE 40 MG
60 TABLET ORAL
Refills: 0 | Status: DISCONTINUED | OUTPATIENT
Start: 2020-01-06 | End: 2020-01-07

## 2020-01-06 RX ORDER — FUROSEMIDE 40 MG
40 TABLET ORAL ONCE
Refills: 0 | Status: DISCONTINUED | OUTPATIENT
Start: 2020-01-06 | End: 2020-01-06

## 2020-01-06 RX ORDER — ALBUTEROL 90 UG/1
2 AEROSOL, METERED ORAL EVERY 6 HOURS
Refills: 0 | Status: DISCONTINUED | OUTPATIENT
Start: 2020-01-06 | End: 2020-01-16

## 2020-01-06 RX ORDER — ALPRAZOLAM 0.25 MG
0.25 TABLET ORAL ONCE
Refills: 0 | Status: DISCONTINUED | OUTPATIENT
Start: 2020-01-06 | End: 2020-01-06

## 2020-01-06 RX ORDER — FUROSEMIDE 40 MG
40 TABLET ORAL DAILY
Refills: 0 | Status: DISCONTINUED | OUTPATIENT
Start: 2020-01-06 | End: 2020-01-06

## 2020-01-06 RX ORDER — ASPIRIN/CALCIUM CARB/MAGNESIUM 324 MG
81 TABLET ORAL DAILY
Refills: 0 | Status: DISCONTINUED | OUTPATIENT
Start: 2020-01-06 | End: 2020-01-07

## 2020-01-06 RX ADMIN — HEPARIN SODIUM 5000 UNIT(S): 5000 INJECTION INTRAVENOUS; SUBCUTANEOUS at 16:44

## 2020-01-06 RX ADMIN — ALBUTEROL 2 PUFF(S): 90 AEROSOL, METERED ORAL at 20:52

## 2020-01-06 RX ADMIN — ALBUTEROL 2 PUFF(S): 90 AEROSOL, METERED ORAL at 12:27

## 2020-01-06 RX ADMIN — CEFEPIME 100 MILLIGRAM(S): 1 INJECTION, POWDER, FOR SOLUTION INTRAMUSCULAR; INTRAVENOUS at 21:54

## 2020-01-06 RX ADMIN — Medication 60 MILLIGRAM(S): at 16:43

## 2020-01-06 RX ADMIN — Medication 0.25 MILLIGRAM(S): at 23:33

## 2020-01-06 RX ADMIN — SODIUM CHLORIDE 500 MILLILITER(S): 9 INJECTION, SOLUTION INTRAVENOUS at 08:47

## 2020-01-06 RX ADMIN — Medication 81 MILLIGRAM(S): at 12:25

## 2020-01-06 RX ADMIN — Medication 650 MILLIGRAM(S): at 08:15

## 2020-01-06 RX ADMIN — Medication 250 MILLIGRAM(S): at 09:00

## 2020-01-06 RX ADMIN — SODIUM CHLORIDE 500 MILLILITER(S): 9 INJECTION, SOLUTION INTRAVENOUS at 10:41

## 2020-01-06 RX ADMIN — AZITHROMYCIN 255 MILLIGRAM(S): 500 TABLET, FILM COATED ORAL at 09:00

## 2020-01-06 RX ADMIN — Medication 60 MILLIGRAM(S): at 21:55

## 2020-01-06 RX ADMIN — CEFEPIME 2000 MILLIGRAM(S): 1 INJECTION, POWDER, FOR SOLUTION INTRAMUSCULAR; INTRAVENOUS at 10:30

## 2020-01-06 RX ADMIN — CARVEDILOL PHOSPHATE 6.25 MILLIGRAM(S): 80 CAPSULE, EXTENDED RELEASE ORAL at 16:45

## 2020-01-06 RX ADMIN — SERTRALINE 100 MILLIGRAM(S): 25 TABLET, FILM COATED ORAL at 12:24

## 2020-01-06 NOTE — H&P ADULT - NSHPLABSRESULTS_GEN_ALL_CORE
CBC Full  -  ( 06 Jan 2020 08:06 )  WBC Count : 21.72 K/uL  RBC Count : 5.38 M/uL  Hemoglobin : 15.5 g/dL  Hematocrit : 46.6 %  Platelet Count - Automated : 175 K/uL  Mean Cell Volume : 86.6 fL  Mean Cell Hemoglobin : 28.8 pg  Mean Cell Hemoglobin Concentration : 33.3 g/dL  Auto Neutrophil # : 12.16 K/uL  Auto Lymphocyte # : 6.19 K/uL  Auto Monocyte # : 1.13 K/uL  Auto Eosinophil # : 0.37 K/uL  Auto Basophil # : 0.37 K/uL  Auto Neutrophil % : 56.0 %  Auto Lymphocyte % : 28.5 %  Auto Monocyte % : 5.2 %  Auto Eosinophil % : 1.7 %  Auto Basophil % : 1.7 %    01-06    139  |  94<L>  |  10  ----------------------------<  268<H>  3.6   |  23  |  1.0    Ca    8.8      06 Jan 2020 08:06  Mg     1.9     01-06    TPro  8.4<H>  /  Alb  3.9  /  TBili  1.2  /  DBili  x   /  AST  35  /  ALT  23  /  AlkPhos  55  01-06

## 2020-01-06 NOTE — ED PROVIDER NOTE - PROGRESS NOTE DETAILS
pt with 100.6 temperature; considered sepsis;  pt with b-lines on exam.  will hold on fluids and lasix.  antibiotics started. pt on bipap; bp 82/51, map 58; 500cc of fluid started since pt cannot be taken off bipap sat92. Pt s/o Dr. Ashley to follow up labs, reassess and dispo. Pt s/o to Dr. Ashley to follow up labs, reassess and dispo. Pt s/o to Dr. Mccullough to follow up labs, reassess and dispo. PT SIGNED OUT TO ME BY DR. VORA, FOLLOW UP LABS, CXR, REASSESS AND DISPO. PT IS DNI/DNR, SIGNED PAPERWORK COMPLETED. 30cc/kg sepsis protocol not given because pt in active pulmonary edema. B-lines on US, elevated BNP, edema on cxr and pt is dni. pt off bipap; pt on nc 3L, /70 MAP 78 pt ran by ICU denied; ICU will still see her. pt ran by ICU denied admission; ICU will evaluate

## 2020-01-06 NOTE — ED PROVIDER NOTE - ATTENDING CONTRIBUTION TO CARE
83 yo female with PMH mild dementia, CHF, COPD, HTN, HLD currently in STR at [facility] sent this AM by EMS for SOB and respiratory distress. Pt hypoxemic this AM in 70s, improved in ED on bipap. pt without any reported fevers, cough, V/D or abdominal pain. Denies any CP, dizziness or palpitations. Pt is DNR/DNI per daughter Sharon who is health care proxy.    VITAL SIGNS: noted  CONSTITUTIONAL: Well-developed; well-nourished; in no acute distress  HEAD: Normocephalic; atraumatic  EYES: PERRL, EOM intact; conjunctiva and sclera clear  ENT: No nasal discharge; airway clear. MMM  NECK: Supple; non tender. No JVD  CARD: S1, S2 normal; no murmurs, gallops, or rubs. Regular rate and rhythm  RESP: + bibasilar rales, no rhonchi, no retractions or increased WOB  ABD: Normal bowel sounds; soft; non-distended; non-tender   EXT: Normal ROM. No calf tenderness, mild LE edema bilateral. Distal pulses intact  NEURO: Alert, oriented. Grossly unremarkable. No focal deficits  SKIN: Skin exam is warm and dry

## 2020-01-06 NOTE — ED ADULT TRIAGE NOTE - CHIEF COMPLAINT QUOTE
Pt BIBA from nursing home for respiratory distress since 7am, pt on cpap upon arrival, pt found to have oxygen saturation of 85% on room air at nursing home.

## 2020-01-06 NOTE — H&P ADULT - HISTORY OF PRESENT ILLNESS
85y/o F w/ hx of demetia at baseline  CHF, COPD, known LBBB is brought in for sob that started this morning. At [facility]'s nursing home pt sat at 70s, when ems arrived sating at 80 on nc; pt placed on bipap immediately leading to drop in blood pressure. Pt vital signs consistent with sepsis on presentation. Received azithro and cefepime in ED along with IV LR bolus 1 litre. On writer's exam pt was conversive and was on NC. Will admit pt for acute on chronic hypercapnic failure along with COPD exacerbation.

## 2020-01-06 NOTE — ED ADULT NURSE NOTE - OBJECTIVE STATEMENT
Pt a&ox1, BIBA from [facility], pt confused at baseline. As per EMS pt having difficulty breathing since last night, placed on CPAP sat @ 92% on arrival. Pt placed on BIPAP sat @ 92% at this time, B/P stable, pt HR 99 at this time. Pt denies any pain at this time. Blood work and VBG sent to lab. Will continue to monitor.

## 2020-01-06 NOTE — ED ADULT NURSE NOTE - NSIMPLEMENTINTERV_GEN_ALL_ED
Implemented All Fall with Harm Risk Interventions:  [place] to call system. Call bell, personal items and telephone within reach. Instruct patient to call for assistance. Room bathroom lighting operational. Non-slip footwear when patient is off stretcher. Physically safe environment: no spills, clutter or unnecessary equipment. Stretcher in lowest position, wheels locked, appropriate side rails in place. Provide visual cue, wrist band, yellow gown, etc. Monitor gait and stability. Monitor for mental status changes and reorient to person, place, and time. Review medications for side effects contributing to fall risk. Reinforce activity limits and safety measures with patient and family. Provide visual clues: red socks.

## 2020-01-06 NOTE — CONSULT NOTE ADULT - ASSESSMENT
IMPRESSION:    Sepsis  PNA  HFrEF  COPD     PLAN:    CNS: Avoid sedatives     HEENT: Oral care    PULMONARY:  HOB @ 45 degrees, Cont home inhalers, check duplex, Prn Bipap and HS    CARDIOVASCULAR: ECHO, Keep i=o    GI: GI prophylaxis.  Feeding     RENAL:  Follow up lytes.  Correct as needed    INFECTIOUS DISEASE: Follow up cultures, Cont Rocephin azithro, check RVP    HEMATOLOGICAL:  DVT prophylaxis.     ENDOCRINE:  Follow up FS.  Insulin protocol if needed.    Poor prognosis   Would not benefit from ICU admission at this time IMPRESSION:    SOB/ CHF/ ? Underlying penumonia  HFrEF ( FF 30%)  COPD   Multiple co morbidities    PLAN:    CNS: Avoid sedatives     HEENT: Oral care    PULMONARY:  HOB @ 45 degrees, Cont home inhalers, check duplex, Prn Bipap and HS    CARDIOVASCULAR: ECHO, Keep i=o, avoid overload    GI: GI prophylaxis.  Feeding     RENAL:  Follow up lytes.  Correct as needed    INFECTIOUS DISEASE: Follow up cultures, abx, rvp    HEMATOLOGICAL:  DVT prophylaxis.     ENDOCRINE:  Follow up FS.  Insulin protocol if needed.    Poor prognosis

## 2020-01-06 NOTE — H&P ADULT - ASSESSMENT
85y/o F w/ hx of demetia at baseline  CHF, COPD, known LBBB is brought in for sob that started this morning. At [facility]'s nursing home pt sat at 70s, when ems arrived sating at 80 on nc; pt placed on bipap immediately leading to drop in blood pressure. Pt vital signs consistent with sepsis on presentation. Received azithro and cefepime in ED along with IV LR bolus 1 litre. On writer's exam pt was conversive and was on NC. Will admit pt for acute on chronic hypercapnic failure along with COPD exacerbation.     #) Acute on chronic Hypercapneic Respiratory failure secondary to COPD exacerbation  - admit to medicine  - Panculture , f/u b/c , f/u sputum culture   - O2 therapy to keep sats 88- 92 %  - IV prednisone 60 mg q 8   - Azithro and cefepime for now  - Use bipap q4 and PRN   - LE duplex   - procalcitonin  - flu negative  - f/u xray in am    #) Acute on chronic CHF  -  keep i < o,  trend daily weights, daily bmp  -  labs:  trend ce / ekg x3,  check tsh/t4, lipids, a1c  - repeat cxr  -  iv diuresis: lasix 40 iv bid  - switch to oral once pt stabilizes  - continue GDMT including bb , statin , aspirin    #) Dementia   use melatonin and zoloft     #) LBBB  - chronic , denies cp , out pt cardio f/u    #) Diet Dash ,RF 1500    #) DVT PPX heparin sq    Dispo Acute

## 2020-01-06 NOTE — H&P ADULT - NSHPPHYSICALEXAM_GEN_ALL_CORE
PHYSICAL EXAM:  GENERAL: NAD, well-developed  HEAD:  Atraumatic, Normocephalic  EYES: EOMI, PERRLA, conjunctiva and sclera clear  NECK: Supple, No JVD  CHEST/LUNG: decreased air entry  HEART: Regular rate and rhythm; No murmurs, rubs, or gallops  ABDOMEN: Soft, Nontender, Nondistended; Bowel sounds present  EXTREMITIES:  2+ Peripheral Pulses, No clubbing, cyanosis, or edema  PSYCH: AAOx3

## 2020-01-06 NOTE — ED PROVIDER NOTE - DIAGNOSTIC INTERPRETATION
ER Physician: MARIE Mccullough M.D.  CHEST XRAY INTERPRETATION: B/L opacities, pulmonary vascular congestion.

## 2020-01-06 NOTE — ED PROVIDER NOTE - CLINICAL SUMMARY MEDICAL DECISION MAKING FREE TEXT BOX
85 Y/O F PMHX AS DOCUMENTED WITH SOB FROM SNF. DNI/DNR. NIPPV INITIATED FOR CHF. PT WITH SEPSIS, TX INITIATED. ALL DIAGNOSTIC TESTING REVIEWED. CASE D/W CRITICAL CARE AND NOT A CANDIDATE FOR ICU ADMISSION. PT ADMITTED TO MEDICINE.

## 2020-01-06 NOTE — ED PROVIDER NOTE - PHYSICAL EXAMINATION
VITAL SIGNS: I have reviewed nursing notes and confirm.  CONSTITUTIONAL: Well-developed; well-nourished; in no acute distress. pt alert answering questions.   SKIN: skin exam is warm and dry, no acute rash.   HEAD: Normocephalic; atraumatic.  EYES:  EOM intact; conjunctiva and sclera clear.  ENT: No nasal discharge; airway clear. moist oral mucosa;   NECK: Supple; non tender.  CARD: S1, S2 normal; no murmurs, gallops, or rubs. Regular rate and rhythm. posterior tibial and radial pulses 2+  RESP: +crackles b/l.  cta b/l. no use of accessory muscles. no retractions  ABD: Normal bowel sounds; soft; non-distended; non-tender; no rebound. negative psoas, rovsign's and murphys.  EXT: Normal ROM. No  cyanosis. very mild piting edema b/l.  no calf tenderness.   BACK: No cva tenderness  LYMPH: No acute cervical adenopathy.  NEURO: Alert, oriented, grossly unremarkable.    PSYCH: Cooperative, appropriate. VITAL SIGNS: I have reviewed nursing notes and confirm.  CONSTITUTIONAL: Well-developed; well-nourished; in no acute distress. pt alert answering questions.   SKIN: skin exam is warm and dry, no acute rash.   HEAD: Normocephalic; atraumatic.  EYES:  EOM intact; conjunctiva and sclera clear.  ENT: No nasal discharge; airway clear. moist oral mucosa;   NECK: Supple; non tender.  CARD: S1, S2 normal; no murmurs, gallops, or rubs. Regular rate and rhythm. posterior tibial and radial pulses 2+  RESP: +crackles b/l.  no use of accessory muscles. no retractions  ABD: Normal bowel sounds; soft; non-distended; non-tender; no rebound. negative psoas, rovsign's and murphys.  EXT: Normal ROM. No  cyanosis. very mild pitting edema b/l.  no calf tenderness.   BACK: No cva tenderness  LYMPH: No acute cervical adenopathy.  NEURO: Alert, oriented, grossly unremarkable.    PSYCH: Cooperative, appropriate.

## 2020-01-06 NOTE — CONSULT NOTE ADULT - SUBJECTIVE AND OBJECTIVE BOX
Patient is a 84y old  Female who presents with a chief complaint of Respiratory Failure (2020 11:12)      HPI:  85y/o F w/ hx of demetia at baseline  CHF, COPD, known LBBB is brought in for sob that started this morning. At [facility] pt sat at 70s, when ems arrived sating at 80 on nc; pt placed on bipap immediately leading to drop in blood pressure. Pt vital signs consistent with sepsis on presentation. Received azithro and cefepime in ED along with IV LR bolus 1 litre. On writer's exam pt was conversive and was on NC. Will admit pt for acute on chronic hypercapnic failure along with COPD exacerbation. (2020 11:12)    ICU called for transient hypotension, transient Bipap requirement. Received 500cc bolus. Currently, respiratory distress is resolved.  Bp is stable, pt on NC, mentating well, saturating 94%.    Has multiple admissions this year. Is at nursing home. Has dementia.   pt is DNR/DNI     PAST MEDICAL & SURGICAL HISTORY:  Obstructive sleep apnea  Presenile dementia with depression  History of left bundle branch block  Metabolic encephalopathy  Urinary tract infection  Dementia  Hyperlipidemia  Hypertension  COPD (chronic obstructive pulmonary disease)  Congestive heart failure  S/P removal of ovarian cyst  H/O lumpectomy  History of appendectomy      SOCIAL HX:   Smoking           -ve               ETOH                            Other    FAMILY HISTORY:  FHx: lung cancer: father      Review of system:  See HPI    Allergies    Bactrim (Unknown)  nitrofurantoin (Short breath; Hives)  sulfa drugs (Unknown)    Intolerances          PHYSICAL EXAM    ICU Vital Signs Last 24 Hrs  T(C): 36.3 (2020 11:51), Max: 38.1 (2020 08:08)  T(F): 97.4 (2020 11:51), Max: 100.6 (2020 08:08)  HR: 74 (2020 11:51) (69 - 116)  BP: 121/63 (2020 11:51) (85/41 - 150/78)  BP(mean): 78 (2020 10:28) (78 - 78)  ABP: --  ABP(mean): --  RR: 18 (2020 11:51) (18 - 22)  SpO2: 95% (2020 11:51) (90% - 95%)    I&O's Detail      General: Comfortable in bed  HEENT:  On NC  Lymph node: No palpable LN             Lungs: bilateral crackles   Cardiovascular: RRR, S1S2  Abdomen: BS+ve; soft non tender  Extremities: No LE edema  Skin:  No evident Rash  Neurological:  AAOx2; No focal deficit      LABS:                          15.5   21.72 )-----------( 175      ( 2020 08:06 )             46.6                                               01-06    139  |  94<L>  |  10  ----------------------------<  268<H>  3.6   |  23  |  1.0    Ca    8.8      2020 08:06  Mg     1.9     -    TPro  8.4<H>  /  Alb  3.9  /  TBili  1.2  /  DBili  x   /  AST  35  /  ALT  23  /  AlkPhos  55                                               Urinalysis Basic - ( 2020 09:12 )    Color: Yellow / Appearance: Slightly Turbid / S.023 / pH: x  Gluc: x / Ketone: Trace  / Bili: Negative / Urobili: 3 mg/dL   Blood: x / Protein: 30 mg/dL / Nitrite: Negative   Leuk Esterase: Negative / RBC: 10 /HPF / WBC 3 /HPF   Sq Epi: x / Non Sq Epi: 4 /HPF / Bacteria: Negative        CARDIAC MARKERS ( 2020 08:06 )  x     / <0.01 ng/mL / x     / x     / x                                                LIVER FUNCTIONS - ( 2020 08:06 )  Alb: 3.9 g/dL / Pro: 8.4 g/dL / ALK PHOS: 55 U/L / ALT: 23 U/L / AST: 35 U/L / GGT: x                                                Serum Pro-Brain Natriuretic Peptide: 3970 pg/mL (20 @ 08:06)      Lactate (20 @ 09:10): 2.3<H>  Lactate (20 @ 08:05): 2.9<H>      MEDICATIONS  (STANDING):  ALBUTerol    90 MICROgram(s) HFA Inhaler 2 Puff(s) Inhalation every 6 hours  aspirin enteric coated 81 milliGRAM(s) Oral daily  azithromycin  IVPB 500 milliGRAM(s) IV Intermittent every 24 hours  carvedilol 6.25 milliGRAM(s) Oral every 12 hours  cefepime   IVPB 1000 milliGRAM(s) IV Intermittent every 12 hours  furosemide    Tablet 40 milliGRAM(s) Oral daily  furosemide   Injectable 40 milliGRAM(s) IV Push two times a day  heparin  Injectable 5000 Unit(s) SubCutaneous every 12 hours  losartan 50 milliGRAM(s) Oral daily  sertraline 100 milliGRAM(s) Oral daily  tiotropium 18 MICROgram(s) Capsule 1 Capsule(s) Inhalation daily    MEDICATIONS  (PRN):      Radiology:    CXR : bilateral interstitial opacities, cardiomegaly     < from: Transthoracic Echocardiogram (19 @ 11:25) >  Summary:   1. Left ventricular ejection fraction, by visual estimation, is 25 to   30%.   2. Severely decreased global left ventricular systolic function.   3. Spectral Doppler shows impaired relaxation pattern of left   ventricular myocardial filling (Grade I diastolic dysfunction).   4. Mild mitral valve regurgitation.   5. Mitral annular calcification.   6. Thickening and calcification of the anterior and posterior mitral   valve leaflets.   7. Mild aortic valve stenosis.    < end of copied text > Patient is a 84y old  Female who presents with a chief complaint of SOB (2020 11:12)      HPI:  83y/o F w/ hx of demetia at baseline  CHF, COPD, known LBBB is brought in for sob that started this morning. At [facility] pt sat at 70s, when ems arrived sating at 80 on nc; pt placed on bipap immediately leading to drop in blood pressure.  Received azithro and cefepime in ED along with IV LR bolus 1 litre. On writer's exam pt was conversive and was on NC. Will admit pt for acute on chronic hypercapnic failure along with COPD exacerbation. (2020 11:12)    ICU called for transient hypotension, transient Bipap requirement. Received 500cc bolus. Currently, respiratory distress is resolved.  Bp is stable, pt on NC, mentating well, saturating 94%.    Has multiple admissions this year. Is at nursing home. Has dementia.   pt is DNR/DNI     PAST MEDICAL & SURGICAL HISTORY:  Obstructive sleep apnea  Presenile dementia with depression  History of left bundle branch block  Metabolic encephalopathy  Urinary tract infection  Dementia  Hyperlipidemia  Hypertension  COPD (chronic obstructive pulmonary disease)  Congestive heart failure  S/P removal of ovarian cyst  H/O lumpectomy  History of appendectomy      SOCIAL HX:   Smoking           -ve               ETOH                            Other    FAMILY HISTORY:  FHx: lung cancer: father      Review of system:  See HPI    Allergies    Bactrim (Unknown)  nitrofurantoin (Short breath; Hives)  sulfa drugs (Unknown)    Intolerances          PHYSICAL EXAM    ICU Vital Signs Last 24 Hrs  T(C): 36.3 (2020 11:51), Max: 38.1 (2020 08:08)  T(F): 97.4 (2020 11:51), Max: 100.6 (2020 08:08)  HR: 74 (2020 11:51) (69 - 116)  BP: 121/63 (2020 11:51) (85/41 - 150/78)  BP(mean): 78 (2020 10:28) (78 - 78)  RR: 18 (2020 11:51) (18 - 22)  SpO2: 95% (2020 11:51) (90% - 95%)    I&O's Detail      General: Comfortable in bed  HEENT:  On NC  Lymph node: No palpable LN             Lungs: bilateral crackles   Cardiovascular: RRR, Kate 3/6  Abdomen: BS+ve; soft non tender  Extremities: No LE edema  Skin:  No evident Rash  Neurological:  AAOx2; No focal deficit      LABS:                          15.5   21.72 )-----------( 175      ( 2020 08:06 )             46.6                                               -    139  |  94<L>  |  10  ----------------------------<  268<H>  3.6   |  23  |  1.0    Ca    8.8      2020 08:06  Mg     1.9         TPro  8.4<H>  /  Alb  3.9  /  TBili  1.2  /  DBili  x   /  AST  35  /  ALT  23  /  AlkPhos  55                                               Urinalysis Basic - ( 2020 09:12 )    Color: Yellow / Appearance: Slightly Turbid / S.023 / pH: x  Gluc: x / Ketone: Trace  / Bili: Negative / Urobili: 3 mg/dL   Blood: x / Protein: 30 mg/dL / Nitrite: Negative   Leuk Esterase: Negative / RBC: 10 /HPF / WBC 3 /HPF   Sq Epi: x / Non Sq Epi: 4 /HPF / Bacteria: Negative        CARDIAC MARKERS ( 2020 08:06 )  x     / <0.01 ng/mL / x     / x     / x                                                LIVER FUNCTIONS - ( 2020 08:06 )  Alb: 3.9 g/dL / Pro: 8.4 g/dL / ALK PHOS: 55 U/L / ALT: 23 U/L / AST: 35 U/L / GGT: x                                                Serum Pro-Brain Natriuretic Peptide: 3970 pg/mL (20 @ 08:06)      Lactate (20 @ 09:10): 2.3<H>  Lactate (20 @ 08:05): 2.9<H>      MEDICATIONS  (STANDING):  ALBUTerol    90 MICROgram(s) HFA Inhaler 2 Puff(s) Inhalation every 6 hours  aspirin enteric coated 81 milliGRAM(s) Oral daily  azithromycin  IVPB 500 milliGRAM(s) IV Intermittent every 24 hours  carvedilol 6.25 milliGRAM(s) Oral every 12 hours  cefepime   IVPB 1000 milliGRAM(s) IV Intermittent every 12 hours  furosemide    Tablet 40 milliGRAM(s) Oral daily  furosemide   Injectable 40 milliGRAM(s) IV Push two times a day  heparin  Injectable 5000 Unit(s) SubCutaneous every 12 hours  losartan 50 milliGRAM(s) Oral daily  sertraline 100 milliGRAM(s) Oral daily  tiotropium 18 MICROgram(s) Capsule 1 Capsule(s) Inhalation daily    MEDICATIONS  (PRN):      Radiology:    CXR : bilateral interstitial opacities, cardiomegaly     < from: Transthoracic Echocardiogram (19 @ 11:25) >  Summary:   1. Left ventricular ejection fraction, by visual estimation, is 25 to   30%.   2. Severely decreased global left ventricular systolic function.   3. Spectral Doppler shows impaired relaxation pattern of left   ventricular myocardial filling (Grade I diastolic dysfunction).   4. Mild mitral valve regurgitation.   5. Mitral annular calcification.   6. Thickening and calcification of the anterior and posterior mitral   valve leaflets.   7. Mild aortic valve stenosis.    < end of copied text >

## 2020-01-06 NOTE — ED PROVIDER NOTE - OBJECTIVE STATEMENT
85y/o F w/ hx of demetia at baseline as per EMS, CHF, COPD, known LBBB is brought in for sob that started this morning. At [facility] pt sat at 70s, when ems arrived sating at 80 on nc; pt placed on bipap immediately. no cough, cp, congestion, runny nose, no vomiting or diarrhea.

## 2020-01-06 NOTE — H&P ADULT - ATTENDING COMMENTS
Patient seen and examined independently. Agree with resident note/ history / physical exam and plan of care with following exceptions/additions/updates. Case discussed with house-staff, nursing and patient/pt decision maker.     pt has acute hypoxic respiratory failure due to acute on chronic systolic chf. needs cardio eval and diurisis with iv lasix. con O2 as needed.   also needs antibiotics due to severe leukocytosis and possible sepsis ( cannot rule out pna in view of abnormal cxr and fluid overlap)   send bc,   may need O2 on dc  needs rehab.    dw pt and her son ( at the bedside. ) full code, they both agree with plan of care. Patient seen and examined independently. Agree with resident note/ history / physical exam and plan of care with following exceptions/additions/updates. Case discussed with house-staff, nursing and patient/pt decision maker.     pt has acute hypoxic respiratory failure due to acute on chronic systolic chf. needs cardio eval and diuresis with iv Lasix. con O2 as needed.   also needs antibiotics due to severe leukocytosis and possible sepsis ( cannot rule out pna in view of abnormal cxr and fluid overlap)   send bc,   may need O2 on dc.   needs rehab.    DW pt and her son ( at the bedside. ) DNR, DNI , they both agree with plan of care.

## 2020-01-06 NOTE — ED PROVIDER NOTE - NS ED ROS FT
Constitutional: (-) fever  Eyes/ENT: (-) blurry vision, (-) epistaxis  Cardiovascular: (-) chest pain, (-) syncope  Respiratory: (-) cough, (+ shortness of breath  Gastrointestinal: (-) vomiting, (-) diarrhea  Musculoskeletal: (-) neck pain, (-) back pain, (-) joint pain  Integumentary: (-) rash, (-) edema  Neurological: (-) headache, (-) altered mental status  Psychiatric: (-) hallucinations  Allergic/Immunologic: (-) pruritus

## 2020-01-07 LAB
ANION GAP SERPL CALC-SCNC: 18 MMOL/L — HIGH (ref 7–14)
BUN SERPL-MCNC: 14 MG/DL — SIGNIFICANT CHANGE UP (ref 10–20)
CALCIUM SERPL-MCNC: 8.6 MG/DL — SIGNIFICANT CHANGE UP (ref 8.5–10.1)
CHLORIDE SERPL-SCNC: 94 MMOL/L — LOW (ref 98–110)
CO2 SERPL-SCNC: 28 MMOL/L — SIGNIFICANT CHANGE UP (ref 17–32)
CREAT SERPL-MCNC: 1 MG/DL — SIGNIFICANT CHANGE UP (ref 0.7–1.5)
CULTURE RESULTS: NO GROWTH — SIGNIFICANT CHANGE UP
GLUCOSE SERPL-MCNC: 145 MG/DL — HIGH (ref 70–99)
HCT VFR BLD CALC: 41.2 % — SIGNIFICANT CHANGE UP (ref 37–47)
HGB BLD-MCNC: 13.6 G/DL — SIGNIFICANT CHANGE UP (ref 12–16)
MCHC RBC-ENTMCNC: 28.5 PG — SIGNIFICANT CHANGE UP (ref 27–31)
MCHC RBC-ENTMCNC: 33 G/DL — SIGNIFICANT CHANGE UP (ref 32–37)
MCV RBC AUTO: 86.4 FL — SIGNIFICANT CHANGE UP (ref 81–99)
PLATELET # BLD AUTO: 87 K/UL — LOW (ref 130–400)
POTASSIUM SERPL-MCNC: 3.4 MMOL/L — LOW (ref 3.5–5)
POTASSIUM SERPL-SCNC: 3.4 MMOL/L — LOW (ref 3.5–5)
RBC # BLD: 4.77 M/UL — SIGNIFICANT CHANGE UP (ref 4.2–5.4)
RBC # FLD: 14.4 % — SIGNIFICANT CHANGE UP (ref 11.5–14.5)
SODIUM SERPL-SCNC: 140 MMOL/L — SIGNIFICANT CHANGE UP (ref 135–146)
SPECIMEN SOURCE: SIGNIFICANT CHANGE UP
WBC # BLD: 9.22 K/UL — SIGNIFICANT CHANGE UP (ref 4.8–10.8)
WBC # FLD AUTO: 9.22 K/UL — SIGNIFICANT CHANGE UP (ref 4.8–10.8)

## 2020-01-07 PROCEDURE — 99233 SBSQ HOSP IP/OBS HIGH 50: CPT

## 2020-01-07 PROCEDURE — 99221 1ST HOSP IP/OBS SF/LOW 40: CPT

## 2020-01-07 RX ORDER — ALPRAZOLAM 0.25 MG
0.25 TABLET ORAL ONCE
Refills: 0 | Status: DISCONTINUED | OUTPATIENT
Start: 2020-01-07 | End: 2020-01-07

## 2020-01-07 RX ORDER — ALPRAZOLAM 0.25 MG
0.25 TABLET ORAL EVERY 8 HOURS
Refills: 0 | Status: DISCONTINUED | OUTPATIENT
Start: 2020-01-07 | End: 2020-01-14

## 2020-01-07 RX ORDER — INFLUENZA VIRUS VACCINE 15; 15; 15; 15 UG/.5ML; UG/.5ML; UG/.5ML; UG/.5ML
0.5 SUSPENSION INTRAMUSCULAR ONCE
Refills: 0 | Status: DISCONTINUED | OUTPATIENT
Start: 2020-01-07 | End: 2020-01-16

## 2020-01-07 RX ORDER — FUROSEMIDE 40 MG
60 TABLET ORAL
Refills: 0 | Status: COMPLETED | OUTPATIENT
Start: 2020-01-07 | End: 2020-01-07

## 2020-01-07 RX ORDER — POTASSIUM CHLORIDE 20 MEQ
40 PACKET (EA) ORAL ONCE
Refills: 0 | Status: COMPLETED | OUTPATIENT
Start: 2020-01-07 | End: 2020-01-07

## 2020-01-07 RX ORDER — FUROSEMIDE 40 MG
60 TABLET ORAL
Refills: 0 | Status: DISCONTINUED | OUTPATIENT
Start: 2020-01-08 | End: 2020-01-08

## 2020-01-07 RX ORDER — PANTOPRAZOLE SODIUM 20 MG/1
40 TABLET, DELAYED RELEASE ORAL
Refills: 0 | Status: DISCONTINUED | OUTPATIENT
Start: 2020-01-07 | End: 2020-01-15

## 2020-01-07 RX ADMIN — CEFEPIME 100 MILLIGRAM(S): 1 INJECTION, POWDER, FOR SOLUTION INTRAMUSCULAR; INTRAVENOUS at 17:44

## 2020-01-07 RX ADMIN — TIOTROPIUM BROMIDE 1 CAPSULE(S): 18 CAPSULE ORAL; RESPIRATORY (INHALATION) at 08:18

## 2020-01-07 RX ADMIN — Medication 40 MILLIEQUIVALENT(S): at 21:01

## 2020-01-07 RX ADMIN — CARVEDILOL PHOSPHATE 6.25 MILLIGRAM(S): 80 CAPSULE, EXTENDED RELEASE ORAL at 17:21

## 2020-01-07 RX ADMIN — Medication 60 MILLIGRAM(S): at 06:59

## 2020-01-07 RX ADMIN — Medication 40 MILLIGRAM(S): at 17:21

## 2020-01-07 RX ADMIN — Medication 81 MILLIGRAM(S): at 12:36

## 2020-01-07 RX ADMIN — LOSARTAN POTASSIUM 50 MILLIGRAM(S): 100 TABLET, FILM COATED ORAL at 06:59

## 2020-01-07 RX ADMIN — HEPARIN SODIUM 5000 UNIT(S): 5000 INJECTION INTRAVENOUS; SUBCUTANEOUS at 06:59

## 2020-01-07 RX ADMIN — AZITHROMYCIN 255 MILLIGRAM(S): 500 TABLET, FILM COATED ORAL at 08:19

## 2020-01-07 RX ADMIN — Medication 0.25 MILLIGRAM(S): at 21:00

## 2020-01-07 RX ADMIN — CARVEDILOL PHOSPHATE 6.25 MILLIGRAM(S): 80 CAPSULE, EXTENDED RELEASE ORAL at 06:59

## 2020-01-07 RX ADMIN — Medication 60 MILLIGRAM(S): at 07:00

## 2020-01-07 RX ADMIN — ALBUTEROL 2 PUFF(S): 90 AEROSOL, METERED ORAL at 08:17

## 2020-01-07 RX ADMIN — ALBUTEROL 2 PUFF(S): 90 AEROSOL, METERED ORAL at 02:23

## 2020-01-07 RX ADMIN — Medication 0.25 MILLIGRAM(S): at 17:21

## 2020-01-07 RX ADMIN — Medication 60 MILLIGRAM(S): at 17:27

## 2020-01-07 RX ADMIN — ALBUTEROL 2 PUFF(S): 90 AEROSOL, METERED ORAL at 16:05

## 2020-01-07 RX ADMIN — CEFEPIME 100 MILLIGRAM(S): 1 INJECTION, POWDER, FOR SOLUTION INTRAMUSCULAR; INTRAVENOUS at 06:58

## 2020-01-07 RX ADMIN — SERTRALINE 100 MILLIGRAM(S): 25 TABLET, FILM COATED ORAL at 12:36

## 2020-01-07 NOTE — PROGRESS NOTE ADULT - SUBJECTIVE AND OBJECTIVE BOX
OVERNIGHT EVENTS: events noted, on NC, looks comfortable, confused    Vital Signs Last 24 Hrs  T(C): 36.1 (2020 23:10), Max: 38.1 (2020 08:08)  T(F): 96.9 (2020 23:10), Max: 100.6 (2020 08:08)  HR: 87 (2020 06:57) (69 - 116)  BP: 147/72 (2020 06:57) (85/41 - 150/78)  BP(mean): 99 (2020 06:57) (78 - 99)  RR: 18 (2020 23:10) (18 - 22)  SpO2: 97% (2020 23:10) (90% - 97%)    PHYSICAL EXAMINATION:    GENERAL: chronically ill looking    HEENT: Head is normocephalic and atraumatic. Extraocular muscles are intact. Mucous membranes are moist.    NECK: Supple.    LUNGS: dec bs both bases    HEART: JORY 3/6    ABDOMEN: Soft, nontender, and nondistended.      EXTREMITIES: SWELLING +    NEUROLOGIC: Grossly intact.    SKIN: No ulceration or induration present.      LABS:                        13.6   9.22  )-----------( 87       ( 2020 04:46 )             41.2     01-07    140  |  94<L>  |  14  ----------------------------<  145<H>  3.4<L>   |  28  |  1.0    Ca    8.6      2020 04:46  Mg     1.9     01-06    TPro  8.4<H>  /  Alb  3.9  /  TBili  1.2  /  DBili  x   /  AST  35  /  ALT  23  /  AlkPhos  55  01-06      Urinalysis Basic - ( 2020 09:12 )    Color: Yellow / Appearance: Slightly Turbid / S.023 / pH: x  Gluc: x / Ketone: Trace  / Bili: Negative / Urobili: 3 mg/dL   Blood: x / Protein: 30 mg/dL / Nitrite: Negative   Leuk Esterase: Negative / RBC: 10 /HPF / WBC 3 /HPF   Sq Epi: x / Non Sq Epi: 4 /HPF / Bacteria: Negative        CARDIAC MARKERS ( 2020 08:06 )  x     / <0.01 ng/mL / x     / x     / x            Serum Pro-Brain Natriuretic Peptide: 3970 pg/mL (20 @ 08:06)              MICROBIOLOGY:      MEDICATIONS  (STANDING):  ALBUTerol    90 MICROgram(s) HFA Inhaler 2 Puff(s) Inhalation every 6 hours  aspirin enteric coated 81 milliGRAM(s) Oral daily  azithromycin  IVPB 500 milliGRAM(s) IV Intermittent every 24 hours  carvedilol 6.25 milliGRAM(s) Oral every 12 hours  cefepime   IVPB 1000 milliGRAM(s) IV Intermittent every 12 hours  furosemide   Injectable 60 milliGRAM(s) IV Push two times a day  heparin  Injectable 5000 Unit(s) SubCutaneous every 12 hours  losartan 50 milliGRAM(s) Oral daily  methylPREDNISolone sodium succinate Injectable 60 milliGRAM(s) IV Push every 8 hours  sertraline 100 milliGRAM(s) Oral daily  tiotropium 18 MICROgram(s) Capsule 1 Capsule(s) Inhalation daily    MEDICATIONS  (PRN):  albuterol/ipratropium for Nebulization 3 milliLiter(s) Nebulizer every 6 hours PRN Shortness of Breath and/or Wheezing      RADIOLOGY & ADDITIONAL STUDIES:

## 2020-01-07 NOTE — PROGRESS NOTE ADULT - ASSESSMENT
IMPRESSION:    SOB/ CHF/ ? Underlying penumonia  HFrEF ( FF 30%)  COPD   Multiple co morbidities    PLAN:    - LASIX IV KEEP NEG BALANCE  - F/UP  CX  - CHANGE SOLUMEDROL TO PREDNISONE 40 DAILY  - ECHO  - KEEP ABX FOR NOW  - POOR PROGNOSIS

## 2020-01-07 NOTE — CONSULT NOTE ADULT - SUBJECTIVE AND OBJECTIVE BOX
Date of Admission: 1/6/2019    CHIEF COMPLAINT: Dyspnea    HISTORY OF PRESENT ILLNESS: 85y/o F w/ hx of dementia, HFrEF, AS s/p AVR (bioprosthetic), COPD is brought in for sob that started this morning. At [facility] nursing home pt sat at 70s, when ems arrived sating at 80 on nc; pt placed on bipap immediately leading to hypotension. Reports increased in LE swelling and chest tightness for over a week.     Today, patient feels good, reduced LE swelling, on NC 3L O2 with good sats.       PAST MEDICAL & SURGICAL HISTORY:  Obstructive sleep apnea  Presenile dementia with depression  History of left bundle branch block  Metabolic encephalopathy  Urinary tract infection  Dementia  Hyperlipidemia  Hypertension  COPD (chronic obstructive pulmonary disease)  Congestive heart failure  S/P removal of ovarian cyst  H/O lumpectomy  History of appendectomy      FAMILY HISTORY:  [x] no pertinent family history of premature cardiovascular disease in first degree relatives.  Mother:   Father:   Siblings:     SOCIAL HISTORY:    [x] Non-smoker. Former smoker, quit >10 years ago.  [ ] Smoker  [ ] Alcohol    Allergies    Bactrim (Unknown)  nitrofurantoin (Short breath; Hives)  sulfa drugs (Unknown)    Intolerances    	    REVIEW OF SYSTEMS:  CONSTITUTIONAL: denies fever, weight loss, or fatigue  CARDIOLOGY: see HPI  RESPIRATORY: see HPI  NEUROLOGICAL: denies weakness, no focal deficits to report.  GI: no BRBPR, no N,V, diarrhea.    PSYCHIATRY: normal mood and affect  HEENT: no nasal discharge, no ecchymosis  SKIN: no ecchymosis, no breakdown  MUSCULOSKELETAL: Full range of motion x4.     PHYSICAL EXAM:  T(C): 36.1 (01-06-20 @ 23:10), Max: 36.3 (01-06-20 @ 11:51)  HR: 87 (01-07-20 @ 06:57) (69 - 87)  BP: 147/72 (01-07-20 @ 06:57) (101/46 - 147/72)  RR: 18 (01-06-20 @ 23:10) (18 - 20)  SpO2: 97% (01-06-20 @ 23:10) (90% - 97%)  Wt(kg): --  I&O's Summary    06 Jan 2020 07:01  -  07 Jan 2020 07:00  --------------------------------------------------------  IN: 50 mL / OUT: 0 mL / NET: 50 mL        General Appearance: well appearing, normal for age and gender. 	  Neck: normal JVP, no bruit.   Eyes: No xanthomalasia, Extra Ocular muscles intact.   Cardiovascular: regular rate and rhythm S1 S2, No JVD, No murmurs, No edema  Respiratory: bilateral crackles mid and lower lung fields bilaterally. 	  Psychiatry: Alert and oriented x 3, Mood & affect appropriate  Gastrointestinal:  Soft, Non-tender  Skin/Integumen: No rashes, No ecchymoses, No cyanosis	  Neurologic: Non-focal  Musculoskeletal/extremities: Normal range of motion, No clubbing, cyanosis or edema  Vascular: Peripheral pulses palpable 2+ bilaterally    LABS:	 	                          13.6   9.22  )-----------( 87       ( 07 Jan 2020 04:46 )             41.2     01-07    140  |  94<L>  |  14  ----------------------------<  145<H>  3.4<L>   |  28  |  1.0    Ca    8.6      07 Jan 2020 04:46  Mg     1.9     01-06    TPro  8.4<H>  /  Alb  3.9  /  TBili  1.2  /  DBili  x   /  AST  35  /  ALT  23  /  AlkPhos  55  01-06    CARDIAC MARKERS ( 06 Jan 2020 08:06 )  x     / <0.01 ng/mL / x     / x     / x            ECG:  	Sinus tachycardia with LBBB  RADIOLOGY:  CXR: bilateral opacities  OTHER: 	    PREVIOUS DIAGNOSTIC TESTING:    [ ] Echocardiogram:  < from: Transthoracic Echocardiogram (06.27.19 @ 11:25) >  Summary:   1. Left ventricular ejection fraction, by visual estimation, is 25 to   30%.   2. Severely decreased global left ventricular systolic function.   3. Spectral Doppler shows impaired relaxation pattern of left   ventricular myocardial filling (Grade I diastolic dysfunction).   4. Mild mitral valve regurgitation.   5. Mitral annular calcification.   6. Thickening and calcification of the anterior and posterior mitral   valve leaflets.   7. Mild aortic valve stenosis.    < end of copied text >    	    Home Medications:  aspirin 81 mg oral delayed release tablet: 1 tab(s) orally once a day (26 Nov 2019 20:50)  carvedilol 6.25 mg oral tablet: 1 tab(s) orally every 12 hours (02 Dec 2019 16:26)  furosemide 40 mg oral tablet: 1 tab(s) orally once a day (02 Dec 2019 16:26)  ipratropium-albuterol 0.5 mg-2.5 mg/3 mLinhalation solution: 3 milliliter(s) inhaled every 6 hours, As needed, Shortness of Breath and/or Wheezing (02 Dec 2019 16:26)  losartan 50 mg oral tablet: 1 tab(s) orally once a day (02 Dec 2019 16:26)  melatonin 5 mg oral tablet: 1 tab(s) orally once a day (at bedtime) (26 Nov 2019 20:50)  Zoloft 100 mg oral tablet: 1 tab(s) orally once a day (26 Nov 2019 20:50)    MEDICATIONS  (STANDING):  ALBUTerol    90 MICROgram(s) HFA Inhaler 2 Puff(s) Inhalation every 6 hours  aspirin enteric coated 81 milliGRAM(s) Oral daily  azithromycin  IVPB 500 milliGRAM(s) IV Intermittent every 24 hours  carvedilol 6.25 milliGRAM(s) Oral every 12 hours  cefepime   IVPB 1000 milliGRAM(s) IV Intermittent every 12 hours  furosemide   Injectable 60 milliGRAM(s) IV Push two times a day  heparin  Injectable 5000 Unit(s) SubCutaneous every 12 hours  losartan 50 milliGRAM(s) Oral daily  methylPREDNISolone sodium succinate Injectable 60 milliGRAM(s) IV Push every 8 hours  sertraline 100 milliGRAM(s) Oral daily  tiotropium 18 MICROgram(s) Capsule 1 Capsule(s) Inhalation daily    MEDICATIONS  (PRN):  albuterol/ipratropium for Nebulization 3 milliLiter(s) Nebulizer every 6 hours PRN Shortness of Breath and/or Wheezing Date of Admission: 1/6/2019    CHIEF COMPLAINT: Dyspnea    HISTORY OF PRESENT ILLNESS: 83y/o F w/ hx of dementia, HFrEF, AS s/p AVR (bioprosthetic), COPD is brought in for sob that started this morning. At [facility] nursing home pt sat at 70s, when ems arrived sating at 80 on nc; pt placed on bipap immediately leading to hypotension. Reports increased in LE swelling and chest tightness for over a week.     Today, patient feels good, reduced LE swelling, on NC 3L O2 with good sats.       PAST MEDICAL & SURGICAL HISTORY:  Obstructive sleep apnea  Presenile dementia with depression  History of left bundle branch block  Metabolic encephalopathy  Urinary tract infection  Dementia  Hyperlipidemia  Hypertension  COPD (chronic obstructive pulmonary disease)  Congestive heart failure  S/P removal of ovarian cyst  H/O lumpectomy  History of appendectomy      FAMILY HISTORY:  [x] no pertinent family history of premature cardiovascular disease in first degree relatives.  Mother:   Father:   Siblings:     SOCIAL HISTORY:    [x] Non-smoker. Former smoker, quit >10 years ago.  [ ] Smoker  [ ] Alcohol    Allergies  Bactrim (Unknown)  nitrofurantoin (Short breath; Hives)  sulfa drugs (Unknown)      REVIEW OF SYSTEMS:  CONSTITUTIONAL: No fever, weight loss, fatigue  NECK: No pain or stiffness  RESPIRATORY: See HPI  CARDIOVASCULAR: See HPI  GASTROINTESTINAL: No abdominal/epigastric pain, nausea, vomiting, hematemesis, diarrhea, constipation, melena or hematochezia  GENITOURINARY: No dysuria, frequency, hematuria, incontinence  NEUROLOGICAL: No headaches, memory loss, loss of strength, numbness, tremors  SKIN: No itching, burning, rashes, lesions   ENDOCRINE: No heat/cold intolerance or hair loss  MUSCULOSKELETAL: No joint pain or swelling  HEME/LYMPH: No easy bruising or bleeding gums.     PHYSICAL EXAM:  T(C): 36.1 (01-06-20 @ 23:10), Max: 36.3 (01-06-20 @ 11:51)  HR: 87 (01-07-20 @ 06:57) (69 - 87)  BP: 147/72 (01-07-20 @ 06:57) (101/46 - 147/72)  RR: 18 (01-06-20 @ 23:10) (18 - 20)  SpO2: 97% (01-06-20 @ 23:10) (90% - 97%)  Wt(kg): --  I&O's Summary    06 Jan 2020 07:01  -  07 Jan 2020 07:00  --------------------------------------------------------  IN: 50 mL / OUT: 0 mL / NET: 50 mL        PHYSICAL EXAM:  General: NAD, AAOx3  HEENT: NCAT, EOMI  Neck: supple, no JVD  CV: RRR, S1S2 nl, no murmurs, no edema  Respiratory: b/l crackles	  Abdomen: soft, NT/ND, +BS  Extremities: ROM nl, 2+ PP bilaterally  Neuro: Nonfocal      LABS:	 	                          13.6   9.22  )-----------( 87       ( 07 Jan 2020 04:46 )             41.2     01-07    140  |  94<L>  |  14  ----------------------------<  145<H>  3.4<L>   |  28  |  1.0    Ca    8.6      07 Jan 2020 04:46  Mg     1.9     01-06    TPro  8.4<H>  /  Alb  3.9  /  TBili  1.2  /  DBili  x   /  AST  35  /  ALT  23  /  AlkPhos  55  01-06    CARDIAC MARKERS ( 06 Jan 2020 08:06 )  x     / <0.01 ng/mL / x     / x     / x            ECG:  	Sinus tachycardia with LBBB    CXR: bilateral opacities  	    < from: Transthoracic Echocardiogram (06.27.19 @ 11:25) >  Summary:   1. Left ventricular ejection fraction, by visual estimation, is 25 to   30%.   2. Severely decreased global left ventricular systolic function.   3. Spectral Doppler shows impaired relaxation pattern of left   ventricular myocardial filling (Grade I diastolic dysfunction).   4. Mild mitral valve regurgitation.   5. Mitral annular calcification.   6. Thickening and calcification of the anterior and posterior mitral   valve leaflets.   7. Mild aortic valve stenosis.    < end of copied text >    	    Home Medications:  aspirin 81 mg oral delayed release tablet: 1 tab(s) orally once a day (26 Nov 2019 20:50)  carvedilol 6.25 mg oral tablet: 1 tab(s) orally every 12 hours (02 Dec 2019 16:26)  furosemide 40 mg oral tablet: 1 tab(s) orally once a day (02 Dec 2019 16:26)  ipratropium-albuterol 0.5 mg-2.5 mg/3 mLinhalation solution: 3 milliliter(s) inhaled every 6 hours, As needed, Shortness of Breath and/or Wheezing (02 Dec 2019 16:26)  losartan 50 mg oral tablet: 1 tab(s) orally once a day (02 Dec 2019 16:26)  melatonin 5 mg oral tablet: 1 tab(s) orally once a day (at bedtime) (26 Nov 2019 20:50)  Zoloft 100 mg oral tablet: 1 tab(s) orally once a day (26 Nov 2019 20:50)    MEDICATIONS  (STANDING):  ALBUTerol    90 MICROgram(s) HFA Inhaler 2 Puff(s) Inhalation every 6 hours  aspirin enteric coated 81 milliGRAM(s) Oral daily  azithromycin  IVPB 500 milliGRAM(s) IV Intermittent every 24 hours  carvedilol 6.25 milliGRAM(s) Oral every 12 hours  cefepime   IVPB 1000 milliGRAM(s) IV Intermittent every 12 hours  furosemide   Injectable 60 milliGRAM(s) IV Push two times a day  heparin  Injectable 5000 Unit(s) SubCutaneous every 12 hours  losartan 50 milliGRAM(s) Oral daily  methylPREDNISolone sodium succinate Injectable 60 milliGRAM(s) IV Push every 8 hours  sertraline 100 milliGRAM(s) Oral daily  tiotropium 18 MICROgram(s) Capsule 1 Capsule(s) Inhalation daily    MEDICATIONS  (PRN):  albuterol/ipratropium for Nebulization 3 milliLiter(s) Nebulizer every 6 hours PRN Shortness of Breath and/or Wheezing

## 2020-01-07 NOTE — CONSULT NOTE ADULT - ASSESSMENT
Patient is a 84y old  Female who presents with a chief complaint of Respiratory Failure. Found to be in volume overload, probably exacerbated by recent pneumonia. Today she feels much better after diuresis and antibiotics. LE swelling came down. She has non-ischemic CMP. Patient is DNR/DNI, benefits of CRT-D are questionable.      Acute HFrEF exacerbation  NICM    Obstructive sleep apnea  Pneumonia  AS s/p AVR  Dementia  Hyperlipidemia  Hypertension  COPD (chronic obstructive pulmonary disease)    Recommendations:   Continue IV diuresis with Lasix 60 IV twice daily  Switch to PO Lasix 40 mg q12h tomorrow  Strict I/O, daily weights, monitor Cr and electrolytes  Continue GDMT   2D echo Patient is a 84y old  Female who presents with a chief complaint of Respiratory Failure. Found to be in volume overload, probably exacerbated by recent pneumonia. Today she feels much better after diuresis and antibiotics. LE swelling came down. She has non-ischemic CMP. Patient is DNR/DNI, benefits of CRT-D are questionable.      Acute HFrEF exacerbation  NICM    Obstructive sleep apnea  Pneumonia  AS s/p AVR  Dementia  Hyperlipidemia  Hypertension  COPD (chronic obstructive pulmonary disease)    Recommendations:   Continue IV diuresis with Lasix 60 IV twice daily  Switch to PO Lasix 40 mg q12h tomorrow  Strict I/O, daily weights, monitor Cr and electrolytes  Continue GDMT

## 2020-01-07 NOTE — PROGRESS NOTE ADULT - ASSESSMENT
#) Acute hypoxic respiratory failure likely secondary to CHF exacerbation, non-ischemic cardiomyopathy   - EKG showed LBBB, last EF (06/2019) 30%. Flu/RSV negative.    - CXR shows improving patchy bilateral airspace and interstitial opacities  - Daily weights, strict IOs, 1.5 L fluid restriction  - Follow-up 2D Echo  - Cardiology: Continue with IV Lasix 60 mg BID, will switch to PO Lasix 40 mg BID tomorrow. Continue with Coreg 6.25 mg BID and Losartan 50 mg QD  - Pulmonary: Change Solumedrol to Prednisone 40 mg QD. Follow-up 2D Echo. Continue with Azithromycin 500 mg QD and Cefepime 1 mg BID per now for questionable pneumonia  - Follow-up bilateral venous LE Duplex, 2D Echo, procalcitonin  - Continue with BiPAP Q4H + PRN  - d/c Aspirin due to poor risk-benefit ratio    #) Thrombocytopenia  - Platelet 175 --> 87, will follow-up CBC in AM    #) COPD   - Duoneb Q6H PRN, Albuterol 2 puffs Q6H, Spiriva     #) Advanced dementia, severe agitation  - Continue with Zoloft 100 mg QD   - Anxiolytic: Xanax 0.25 mg Q8H PRN Anxiety/Agitation    #) Diet: DASH, 1.5 L Fluid restriction  #) Activity: Increase as tolerated, fall precautions   - Follow-up Physical Therapy   - Physiatry: STR/SNF    #) GI ppx: PO Protonix 40 mg QD  #) DVT ppx: HSQ BID     #) Dispo: SNF, possible discharge in 24 hrs. Anticipated for tomorrow  #) Code Status: DNR/DNI

## 2020-01-07 NOTE — PROGRESS NOTE ADULT - SUBJECTIVE AND OBJECTIVE BOX
OVERNIGHT EVENTS:   No acute overnight events. Currently requiring 2 L NC. Has a primafit with good urine output. pCO2 improved from 63 to 55. Flu/RSV is negative.     Patient was severely agitated throughout the day. Xanax was started as PRN    MEDICATIONS:  STANDING MEDICATIONS  ALBUTerol    90 MICROgram(s) HFA Inhaler 2 Puff(s) Inhalation every 6 hours  aspirin enteric coated 81 milliGRAM(s) Oral daily  azithromycin  IVPB 500 milliGRAM(s) IV Intermittent every 24 hours  carvedilol 6.25 milliGRAM(s) Oral every 12 hours  cefepime   IVPB 1000 milliGRAM(s) IV Intermittent every 12 hours  heparin  Injectable 5000 Unit(s) SubCutaneous every 12 hours  losartan 50 milliGRAM(s) Oral daily  predniSONE   Tablet 40 milliGRAM(s) Oral daily  sertraline 100 milliGRAM(s) Oral daily  tiotropium 18 MICROgram(s) Capsule 1 Capsule(s) Inhalation daily    PRN MEDICATIONS  albuterol/ipratropium for Nebulization 3 milliLiter(s) Nebulizer every 6 hours PRN  ALPRAZolam 0.25 milliGRAM(s) Oral every 8 hours PRN    VITALS:   T(F): 98  HR: 89  BP: 166/70  RR: 18  SpO2: 95%    LABS:                        13.6   9.22  )-----------( 87       ( 2020 04:46 )             41.2     01-07    140  |  94<L>  |  14  ----------------------------<  145<H>  3.4<L>   |  28  |  1.0    Ca    8.6      2020 04:46  Mg     1.9     01-06    TPro  8.4<H>  /  Alb  3.9  /  TBili  1.2  /  DBili  x   /  AST  35  /  ALT  23  /  AlkPhos  55  01-06      Urinalysis Basic - ( 2020 09:12 )    Color: Yellow / Appearance: Slightly Turbid / S.023 / pH: x  Gluc: x / Ketone: Trace  / Bili: Negative / Urobili: 3 mg/dL   Blood: x / Protein: 30 mg/dL / Nitrite: Negative   Leuk Esterase: Negative / RBC: 10 /HPF / WBC 3 /HPF   Sq Epi: x / Non Sq Epi: 4 /HPF / Bacteria: Negative            Culture - Urine (collected 2020 09:12)  Source: .Urine Clean Catch (Midstream)  Final Report (2020 15:32):    No growth    Culture - Blood (collected 2020 08:45)  Source: .Blood Blood  Preliminary Report (2020 17:01):    No growth to date.    Culture - Blood (collected 2020 08:40)  Source: .Blood Blood  Preliminary Report (2020 17:01):    No growth to date.      CARDIAC MARKERS ( 2020 08:06 )  x     / <0.01 ng/mL / x     / x     / x          RADIOLOGY:  < from: Xray Chest 1 View-PORTABLE IMMEDIATE (20 @ 18:52) >  Improving patchy bilateral airspace and interstitial opacities.    < end of copied text >    PHYSICAL EXAM:  GEN: No acute distress  HEENT: NCAT  LUNGS: No wheezing   HEART: S1/S2 present. RRR.   ABD: Soft, non-tender, non-distended. Bowel sounds present  EXT: No pitting edema  NEURO: AAOX3

## 2020-01-07 NOTE — CONSULT NOTE ADULT - SUBJECTIVE AND OBJECTIVE BOX
HPI:  85y/o F w/ hx of demetia at baseline  CHF, COPD, known LBBB is brought in for sob that started this morning. At [facility] nursing home pt sat at 70s, when ems arrived sating at 80 on nc; pt placed on bipap immediately leading to drop in blood pressure. Pt vital signs consistent with sepsis on presentation. Received azithro and cefepime in ED along with IV LR bolus 1 litre. On writer's exam pt was conversive and was on NC. Will admit pt for acute on chronic hypercapnic failure along with COPD exacerbation. (2020 11:12)      PAST MEDICAL & SURGICAL HISTORY:  Obstructive sleep apnea  Presenile dementia with depression  History of left bundle branch block  Metabolic encephalopathy  Urinary tract infection  Dementia  Hyperlipidemia  Hypertension  COPD (chronic obstructive pulmonary disease)  Congestive heart failure  S/P removal of ovarian cyst  H/O lumpectomy  History of appendectomy      Hospital Course:    TODAY'S SUBJECTIVE & REVIEW OF SYMPTOMS:     Constitutional WNL   Cardio WNL   Resp WNL   GI WNL  Heme WNL  Endo WNL  Skin WNL  MSK WNL  Neuro WNL  Cognitive confused  Psych WNL      MEDICATIONS  (STANDING):  ALBUTerol    90 MICROgram(s) HFA Inhaler 2 Puff(s) Inhalation every 6 hours  aspirin enteric coated 81 milliGRAM(s) Oral daily  azithromycin  IVPB 500 milliGRAM(s) IV Intermittent every 24 hours  carvedilol 6.25 milliGRAM(s) Oral every 12 hours  cefepime   IVPB 1000 milliGRAM(s) IV Intermittent every 12 hours  furosemide   Injectable 60 milliGRAM(s) IV Push two times a day  heparin  Injectable 5000 Unit(s) SubCutaneous every 12 hours  losartan 50 milliGRAM(s) Oral daily  predniSONE   Tablet 40 milliGRAM(s) Oral daily  sertraline 100 milliGRAM(s) Oral daily  tiotropium 18 MICROgram(s) Capsule 1 Capsule(s) Inhalation daily    MEDICATIONS  (PRN):  albuterol/ipratropium for Nebulization 3 milliLiter(s) Nebulizer every 6 hours PRN Shortness of Breath and/or Wheezing  ALPRAZolam 0.25 milliGRAM(s) Oral every 8 hours PRN Anxiety/Agitation      FAMILY HISTORY:  FHx: lung cancer: father      Allergies    Bactrim (Unknown)  nitrofurantoin (Short breath; Hives)  sulfa drugs (Unknown)    Intolerances        SOCIAL HISTORY:    [  ] Etoh  [  ] Smoking  [  ] Substance abuse     Home Environment:  [  ] Home Alone  [  ] Lives with Family  [  ] Home Health Aid    Dwelling:  [  ] Apartment  [  ] Private House  [  ] Adult Home  [  ] Skilled Nursing Facility      [x  ] Short Term  [  ] Long Term  [  ] Stairs       Elevator [  ]    FUNCTIONAL STATUS PTA: (Check all that apply)  Ambulation: [   ]Independent    [x  ] Dependent     [  ] Non-Ambulatory  Assistive Device: [  ] SA Cane  [  ]  Q Cane  [  ] Walker  [  ]  Wheelchair  ADL : [  ] Independent  [x  ]  Dependent       Vital Signs Last 24 Hrs  T(C): 36.1 (2020 23:10), Max: 36.1 (2020 23:10)  T(F): 96.9 (2020 23:10), Max: 96.9 (2020 23:10)  HR: 87 (2020 06:57) (75 - 87)  BP: 147/72 (2020 06:57) (135/64 - 147/72)  BP(mean): 99 (2020 06:57) (92 - 99)  RR: 18 (2020 23:10) (18 - 20)  SpO2: 97% (2020 23:10) (96% - 97%)      PHYSICAL EXAM: confused  GENERAL: NAD  HEAD:  Atraumatic, Normocephalic  CHEST/LUNG: Clear   HEART: S1S2+  ABDOMEN: Soft, Nontender  EXTREMITIES:  no calf tenderness    NERVOUS SYSTEM:  Cranial Nerves 2-12 intact [  ] Abnormal  [  ]  ROM: WFL all extremities [ x ]  Abnormal [  ]  Motor Strength: WFL all extremities  [ x ]  Abnormal [  ]  Sensation: intact to light touch [ x ] Abnormal [  ]  Reflexes: Symmetric [  ]  Abnormal [  ]    FUNCTIONAL STATUS:  Bed Mobility: Independent [  ]  Supervision [  ]  Needs Assistance [x  ]  N/A [  ]  Transfers: Independent [  ]  Supervision [  ]  Needs Assistance [x  ]  N/A [  ]   Ambulation: Independent [  ]  Supervision [  ]  Needs Assistance [  ]  N/A [  ]  ADL: Independent [  ] Requires Assistance [  ] N/A [  ]      LABS:                        13.6   9.22  )-----------( 87       ( 2020 04:46 )             41.2     01-07    140  |  94<L>  |  14  ----------------------------<  145<H>  3.4<L>   |  28  |  1.0    Ca    8.6      2020 04:46  Mg     1.9     -    TPro  8.4<H>  /  Alb  3.9  /  TBili  1.2  /  DBili  x   /  AST  35  /  ALT  23  /  AlkPhos  55  -      Urinalysis Basic - ( 2020 09:12 )    Color: Yellow / Appearance: Slightly Turbid / S.023 / pH: x  Gluc: x / Ketone: Trace  / Bili: Negative / Urobili: 3 mg/dL   Blood: x / Protein: 30 mg/dL / Nitrite: Negative   Leuk Esterase: Negative / RBC: 10 /HPF / WBC 3 /HPF   Sq Epi: x / Non Sq Epi: 4 /HPF / Bacteria: Negative        RADIOLOGY & ADDITIONAL STUDIES:    Assesment:

## 2020-01-07 NOTE — PROGRESS NOTE ADULT - SUBJECTIVE AND OBJECTIVE BOX
pt seen and examined    feels better, no sob today but she has waxing and wanning mental status,   now she is calm and cooperative  Vital Signs Last 24 Hrs  T(C): 36.7 (07 Jan 2020 16:43), Max: 36.7 (07 Jan 2020 16:43)  T(F): 98 (07 Jan 2020 16:43), Max: 98 (07 Jan 2020 16:43)  HR: 89 (07 Jan 2020 16:43) (75 - 89)  BP: 166/70 (07 Jan 2020 16:43) (135/64 - 166/70)  BP(mean): 99 (07 Jan 2020 06:57) (92 - 99)  RR: 18 (07 Jan 2020 16:43) (18 - 20)  SpO2: 95% (07 Jan 2020 16:43) (95% - 97%)    Physical exam:   constitutional NAD, AA, Respiratory  lungs CTA, CVS heart RRR, GI: abdomen Soft NT, ND, BS+,   neuro exam gait disturbance                          13.6   9.22  )-----------( 87       ( 07 Jan 2020 04:46 )             41.2     01-07    140  |  94<L>  |  14  ----------------------------<  145<H>  3.4<L>   |  28  |  1.0    Ca    8.6      07 Jan 2020 04:46  Mg     1.9     01-06    TPro  8.4<H>  /  Alb  3.9  /  TBili  1.2  /  DBili  x   /  AST  35  /  ALT  23  /  AlkPhos  55  01-06      a/p  #acute hypoxic resp failure. hx of chf and copd : due to CHF , acute on chronic systolic , now stable   #leukocytosis resolved, ( probably due to demargination,) fu bc, if neg dc abx  #PAST MEDICAL & SURGICAL HISTORY:  Obstructive sleep apnea  Presenile dementia with depression  History of left bundle branch block  Metabolic encephalopathy  Urinary tract infection  Dementia  Hyperlipidemia  Hypertension  COPD (chronic obstructive pulmonary disease)  Congestive heart failure  S/P removal of ovarian cyst  H/O lumpectomy  History of appendectomy    #Progress Note Handoff  Pending (specify):  bc, clinical improvement,   Disposition: SNF, poss dc in 24 hrs.

## 2020-01-08 LAB
ANION GAP SERPL CALC-SCNC: 14 MMOL/L — SIGNIFICANT CHANGE UP (ref 7–14)
BASOPHILS # BLD AUTO: 0.01 K/UL — SIGNIFICANT CHANGE UP (ref 0–0.2)
BASOPHILS NFR BLD AUTO: 0.1 % — SIGNIFICANT CHANGE UP (ref 0–1)
BUN SERPL-MCNC: 23 MG/DL — HIGH (ref 10–20)
CALCIUM SERPL-MCNC: 8.9 MG/DL — SIGNIFICANT CHANGE UP (ref 8.5–10.1)
CHLORIDE SERPL-SCNC: 94 MMOL/L — LOW (ref 98–110)
CO2 SERPL-SCNC: 32 MMOL/L — SIGNIFICANT CHANGE UP (ref 17–32)
CREAT SERPL-MCNC: 1 MG/DL — SIGNIFICANT CHANGE UP (ref 0.7–1.5)
EOSINOPHIL # BLD AUTO: 0.02 K/UL — SIGNIFICANT CHANGE UP (ref 0–0.7)
EOSINOPHIL NFR BLD AUTO: 0.2 % — SIGNIFICANT CHANGE UP (ref 0–8)
GLUCOSE SERPL-MCNC: 149 MG/DL — HIGH (ref 70–99)
HCT VFR BLD CALC: 37.7 % — SIGNIFICANT CHANGE UP (ref 37–47)
HGB BLD-MCNC: 12.8 G/DL — SIGNIFICANT CHANGE UP (ref 12–16)
IMM GRANULOCYTES NFR BLD AUTO: 0.5 % — HIGH (ref 0.1–0.3)
LYMPHOCYTES # BLD AUTO: 2.7 K/UL — SIGNIFICANT CHANGE UP (ref 1.2–3.4)
LYMPHOCYTES # BLD AUTO: 21.5 % — SIGNIFICANT CHANGE UP (ref 20.5–51.1)
MCHC RBC-ENTMCNC: 29 PG — SIGNIFICANT CHANGE UP (ref 27–31)
MCHC RBC-ENTMCNC: 34 G/DL — SIGNIFICANT CHANGE UP (ref 32–37)
MCV RBC AUTO: 85.3 FL — SIGNIFICANT CHANGE UP (ref 81–99)
MONOCYTES # BLD AUTO: 0.97 K/UL — HIGH (ref 0.1–0.6)
MONOCYTES NFR BLD AUTO: 7.7 % — SIGNIFICANT CHANGE UP (ref 1.7–9.3)
NEUTROPHILS # BLD AUTO: 8.81 K/UL — HIGH (ref 1.4–6.5)
NEUTROPHILS NFR BLD AUTO: 70 % — SIGNIFICANT CHANGE UP (ref 42.2–75.2)
NRBC # BLD: 0 /100 WBCS — SIGNIFICANT CHANGE UP (ref 0–0)
PLATELET # BLD AUTO: 94 K/UL — LOW (ref 130–400)
POTASSIUM SERPL-MCNC: 3.3 MMOL/L — LOW (ref 3.5–5)
POTASSIUM SERPL-SCNC: 3.3 MMOL/L — LOW (ref 3.5–5)
PROCALCITONIN SERPL-MCNC: 0.16 NG/ML — HIGH (ref 0.02–0.1)
RBC # BLD: 4.42 M/UL — SIGNIFICANT CHANGE UP (ref 4.2–5.4)
RBC # FLD: 14.4 % — SIGNIFICANT CHANGE UP (ref 11.5–14.5)
SODIUM SERPL-SCNC: 140 MMOL/L — SIGNIFICANT CHANGE UP (ref 135–146)
WBC # BLD: 12.57 K/UL — HIGH (ref 4.8–10.8)
WBC # FLD AUTO: 12.57 K/UL — HIGH (ref 4.8–10.8)

## 2020-01-08 PROCEDURE — 93970 EXTREMITY STUDY: CPT | Mod: 26

## 2020-01-08 PROCEDURE — 99233 SBSQ HOSP IP/OBS HIGH 50: CPT

## 2020-01-08 RX ORDER — FUROSEMIDE 40 MG
40 TABLET ORAL
Refills: 0 | Status: DISCONTINUED | OUTPATIENT
Start: 2020-01-08 | End: 2020-01-16

## 2020-01-08 RX ADMIN — HEPARIN SODIUM 5000 UNIT(S): 5000 INJECTION INTRAVENOUS; SUBCUTANEOUS at 05:55

## 2020-01-08 RX ADMIN — Medication 60 MILLIGRAM(S): at 05:55

## 2020-01-08 RX ADMIN — HEPARIN SODIUM 5000 UNIT(S): 5000 INJECTION INTRAVENOUS; SUBCUTANEOUS at 17:25

## 2020-01-08 RX ADMIN — Medication 40 MILLIGRAM(S): at 19:37

## 2020-01-08 RX ADMIN — Medication 40 MILLIGRAM(S): at 05:55

## 2020-01-08 RX ADMIN — CARVEDILOL PHOSPHATE 6.25 MILLIGRAM(S): 80 CAPSULE, EXTENDED RELEASE ORAL at 17:25

## 2020-01-08 RX ADMIN — CARVEDILOL PHOSPHATE 6.25 MILLIGRAM(S): 80 CAPSULE, EXTENDED RELEASE ORAL at 05:55

## 2020-01-08 RX ADMIN — AZITHROMYCIN 255 MILLIGRAM(S): 500 TABLET, FILM COATED ORAL at 13:54

## 2020-01-08 RX ADMIN — SERTRALINE 100 MILLIGRAM(S): 25 TABLET, FILM COATED ORAL at 13:54

## 2020-01-08 RX ADMIN — CEFEPIME 100 MILLIGRAM(S): 1 INJECTION, POWDER, FOR SOLUTION INTRAMUSCULAR; INTRAVENOUS at 05:54

## 2020-01-08 RX ADMIN — PANTOPRAZOLE SODIUM 40 MILLIGRAM(S): 20 TABLET, DELAYED RELEASE ORAL at 05:55

## 2020-01-08 RX ADMIN — LOSARTAN POTASSIUM 50 MILLIGRAM(S): 100 TABLET, FILM COATED ORAL at 05:55

## 2020-01-08 RX ADMIN — Medication 0.25 MILLIGRAM(S): at 13:10

## 2020-01-08 NOTE — PHYSICAL THERAPY INITIAL EVALUATION ADULT - CRITERIA FOR SKILLED THERAPEUTIC INTERVENTIONS
therapy frequency/rehab potential/impairments found/risk reduction/prevention/predicted duration of therapy intervention/functional limitations in following categories

## 2020-01-08 NOTE — PROGRESS NOTE ADULT - SUBJECTIVE AND OBJECTIVE BOX
OPAL MCCARTY  84y  Female      Patient is a 84y old  Female who presents with a chief complaint of Respiratory Failure (08 Jan 2020 10:34)      INTERVAL HPI/OVERNIGHT EVENTS:      ******************************* REVIEW OF SYSTEMS:**********************************************    All other review of systems negative    *********************** VITALS ******************************************    T(F): 96.9 (01-08-20 @ 12:22)  HR: 69 (01-08-20 @ 12:22) (69 - 89)  BP: 130/70 (01-08-20 @ 12:22) (121/56 - 169/78)  RR: 18 (01-08-20 @ 12:22) (18 - 18)  SpO2: 91% (01-07-20 @ 20:35) (88% - 95%)            ******************************** PHYSICAL EXAM:**************************************************  GENERAL: NAD    PSYCH: no agitation, baseline mentation  HEENT:     NERVOUS SYSTEM:  Alert & Oriented X0-1  PULMONARY: RICHARD, CTA    CARDIOVASCULAR: S1S2 RRR    GI: Soft, NT, ND; BS present.    EXTREMITIES:  2+ Peripheral Pulses, No clubbing, cyanosis, or edema    LYMPH: No lymphadenopathy noted    SKIN: No rashes or lesions    ******************************************************************************************      **************************** LABS *******************************************************                          12.8   12.57 )-----------( 94       ( 08 Jan 2020 07:02 )             37.7     01-08    140  |  94<L>  |  23<H>  ----------------------------<  149<H>  3.3<L>   |  32  |  1.0    Ca    8.9      08 Jan 2020 07:02            Lactate Trend        CAPILLARY BLOOD GLUCOSE              **************************Active Medications *******************************************  Bactrim (Unknown)  nitrofurantoin (Short breath; Hives)  sulfa drugs (Unknown)      ALBUTerol    90 MICROgram(s) HFA Inhaler 2 Puff(s) Inhalation every 6 hours  albuterol/ipratropium for Nebulization 3 milliLiter(s) Nebulizer every 6 hours PRN  ALPRAZolam 0.25 milliGRAM(s) Oral every 8 hours PRN  carvedilol 6.25 milliGRAM(s) Oral every 12 hours  furosemide    Tablet 40 milliGRAM(s) Oral two times a day  heparin  Injectable 5000 Unit(s) SubCutaneous every 12 hours  influenza   Vaccine 0.5 milliLiter(s) IntraMuscular once  losartan 50 milliGRAM(s) Oral daily  pantoprazole    Tablet 40 milliGRAM(s) Oral before breakfast  predniSONE   Tablet 40 milliGRAM(s) Oral daily  sertraline 100 milliGRAM(s) Oral daily  tiotropium 18 MICROgram(s) Capsule 1 Capsule(s) Inhalation daily      ***************************************************  RADIOLOGY & ADDITIONAL TESTS:    Imaging Personally Reviewed:  [ ] YES  [ ] NO    HEALTH ISSUES - PROBLEM Dx:

## 2020-01-08 NOTE — PROGRESS NOTE ADULT - ASSESSMENT
This is an 84 year old female with PMHx of Dementia, CHFrEF (EF 25-30% in 6/2019), COPD, known LBBB, who presented from [facility] with Shortness of Breath    #Acute hypoxic respiratory failure secondary to CHF exacerbation  - EKG with known LBBB   - TTE noted in 6/2019 with EF 25-30%  - CXR: bilateral patchy airspace and interstitial opacities   - Lasix converted to PO today; 40mg PO BID as per cardiology  - Continue on Coreg 6.25mg BID and Losartan 50mg Daily   -  Currently  on Azithromycin and Cefepime     would de escalate abx.   - TTE ordered; doubt usefulness given TTE within last 6-7 months  - Duplex ordered  - BiPAP prn   - Wean O2 as tolerated  - Daily weights, strict Is and Os    #Thrombocytopenia   - Platelets today 94 and up trending    #COPD   - Continue on Duonebs q6 PRN  - Wean O2 as tolerated  - Albuterol 2 Puffs q6  - Spiriva     #Advanced dementia with behavioral disturbance  - Continue on Zoloft 100mg QD  - Continue on Xanax 0.25mg q8 PRN for agitation     Activity: As tolerated  Diet: DASH  DVT ppx: Heparin  GI ppx: Protonix  Code Status: Full Code  DISPO: From [facility], anticipate in 24 hours    #Progress Note Handoff  Pending (specify):  Clinical improvement.   Family discussion:  Disposition: SNF, possibly in 24 hours.

## 2020-01-08 NOTE — PHYSICAL THERAPY INITIAL EVALUATION ADULT - LIVES WITH, PROFILE
pt was in [facility] prior to this admission. Pt is poor historian 2* to baseline demential

## 2020-01-08 NOTE — PROGRESS NOTE ADULT - SUBJECTIVE AND OBJECTIVE BOX
Hospital Day:  2d    Subjective:    Patient is a 84y old  Female who presents with a chief complaint of Respiratory Failure (2020 17:27)    No overnight events. Seen and examined at bedside. Extremely confused patient who constantly needs to have everything explained to her. She also is attention seeking by throwing her food on the floor if nobody is in the room with her. Remainder of review of systems otherwise negative.     Past Medical Hx:   Obstructive sleep apnea  Presenile dementia with depression  History of left bundle branch block  Metabolic encephalopathy  Urinary tract infection  Dementia  Hyperlipidemia  Hypertension  COPD (chronic obstructive pulmonary disease)  Congestive heart failure    Past Sx:  S/P removal of ovarian cyst  H/O lumpectomy  History of appendectomy    Allergies:  Bactrim (Unknown)  nitrofurantoin (Short breath; Hives)  sulfa drugs (Unknown)    Current Meds:   Standng Meds:  ALBUTerol    90 MICROgram(s) HFA Inhaler 2 Puff(s) Inhalation every 6 hours  azithromycin  IVPB 500 milliGRAM(s) IV Intermittent every 24 hours  carvedilol 6.25 milliGRAM(s) Oral every 12 hours  cefepime   IVPB 1000 milliGRAM(s) IV Intermittent every 12 hours  furosemide    Tablet 40 milliGRAM(s) Oral two times a day  heparin  Injectable 5000 Unit(s) SubCutaneous every 12 hours  influenza   Vaccine 0.5 milliLiter(s) IntraMuscular once  losartan 50 milliGRAM(s) Oral daily  pantoprazole    Tablet 40 milliGRAM(s) Oral before breakfast  predniSONE   Tablet 40 milliGRAM(s) Oral daily  sertraline 100 milliGRAM(s) Oral daily  tiotropium 18 MICROgram(s) Capsule 1 Capsule(s) Inhalation daily    PRN Meds:  albuterol/ipratropium for Nebulization 3 milliLiter(s) Nebulizer every 6 hours PRN Shortness of Breath and/or Wheezing  ALPRAZolam 0.25 milliGRAM(s) Oral every 8 hours PRN Anxiety/Agitation    HOME MEDICATIONS:  aspirin 81 mg oral delayed release tablet: 1 tab(s) orally once a day  carvedilol 6.25 mg oral tablet: 1 tab(s) orally every 12 hours  furosemide 40 mg oral tablet: 1 tab(s) orally once a day  ipratropium-albuterol 0.5 mg-2.5 mg/3 mLinhalation solution: 3 milliliter(s) inhaled every 6 hours, As needed, Shortness of Breath and/or Wheezing  losartan 50 mg oral tablet: 1 tab(s) orally once a day  melatonin 5 mg oral tablet: 1 tab(s) orally once a day (at bedtime)  Zoloft 100 mg oral tablet: 1 tab(s) orally once a day      Vital Signs:   T(F): 98 (20 @ 05:28), Max: 98 (20 @ 16:43)  HR: 77 (20 @ 05:28) (77 - 89)  BP: 169/78 (20 @ 05:28) (121/56 - 169/78)  RR: 18 (20 @ 05:28) (18 - 18)  SpO2: 91% (20 @ 20:35) (88% - 95%)    Physical Exam:   GENERAL: Elderly and confused  HEENT: NCAT, PERRLA, EOMI  CHEST/LUNG: Clear to auscultation bilaterally, no wheezing, rhonchi, rales   HEART: Regular rate and rhythm; s1 s2 appreciated, No murmurs, rubs, or gallops  ABDOMEN: Soft, Nontender, Nondistended; Bowel sounds present  EXTREMITIES: Trace LE edema, Peripheral pulses 2+, No cyanosis, No clubbing  NERVOUS SYSTEM:  AAOx 0-1, Non focal    Labs:                         12.8   12.57 )-----------( 94       ( 2020 07:02 )             37.7     Neutophil% 70.0, Lymphocyte% 21.5, Monocyte% 7.7, Bands% 0.5 20 @ 07:02    2020 07:02    140    |  94     |  23     ----------------------------<  149    3.3     |  32     |  1.0      Ca    8.9        2020 07:02    Serum Pro-Brain Natriuretic Peptide: 3970 pg/mL (20 @ 08:06)    Troponin <0.01, CKMB --, CK -- 20 @ 08:06    Urinalysis Basic - ( 2020 09:12 )    Color: Yellow / Appearance: Slightly Turbid / S.023 / pH: x  Gluc: x / Ketone: Trace  / Bili: Negative / Urobili: 3 mg/dL   Blood: x / Protein: 30 mg/dL / Nitrite: Negative   Leuk Esterase: Negative / RBC: 10 /HPF / WBC 3 /HPF   Sq Epi: x / Non Sq Epi: 4 /HPF / Bacteria: Negative    Culture - Blood (collected 20 @ 08:45)  Source: .Blood Blood  Preliminary Report (20 @ 17:01):    No growth to date.    Culture - Blood (collected 20 @ 08:40)  Source: .Blood Blood  Preliminary Report (20 @ 17:01):    No growth to date.    Radiology:   None Today    Assessment and Plan:   This is an 84 year old female with PMHx of Dementia, CHFrEF (EF 25-30% in 2019), COPD, known LBBB, who presented from [facility] with Shortness of Breath    #Acute hypoxic respiratory failure secondary to CHF exacerbation  - EKG with known LBBB   - TTE noted in 2019 with EF 25-30%  - CXR: bilateral patchy airspace and interstitial opacities   - Lasix converted to PO today; 40mg PO BID as per cardiology  - Continue on Coreg 6.25mg BID and Losartan 50mg Daily   - Cardiology consulted and recs appreciated  - Pulmonary consulted: Continue on Azithromycin and Cefepime   - TTE ordered; doubt usefulness given TTE within last 6-7 months  - Duplex ordered  - BiPAP prn   - Wean O2 as tolerated  - Daily weights, strict Is and Os    #Thrombocytopenia   - Platelets today 94 and uptrending    #COPD   - Continue on Duonebs q6 PRN  - Wean O2 as tolerated  - Albuterol 2 Puffs q6  - Spiriva     #Advanced dementia with behavioral disturbance  - Continue on Zoloft 100mg QD  - Continue on Xanax 0.25mg q8 PRN for agitation     Activity: As tolerated  Diet: DASH  DVT ppx: Heparin  GI ppx: Protonix  Code Status: Full Code  DISPO: From [facility], anticipate in 24 hours

## 2020-01-08 NOTE — PHYSICAL THERAPY INITIAL EVALUATION ADULT - GENERAL OBSERVATIONS, REHAB EVAL
Pt encountered sitting in recliner in NAD, + primafit, +O2 2 lpm via NC, + chair alarm, +lap belt, agreeable to PT. 9:20-10:00.Pt encountered sitting in recliner in NAD, + primafit, +O2 2 lpm via NC, + chair alarm, +lap belt, agreeable to PT.

## 2020-01-09 PROCEDURE — 99233 SBSQ HOSP IP/OBS HIGH 50: CPT

## 2020-01-09 PROCEDURE — 93306 TTE W/DOPPLER COMPLETE: CPT | Mod: 26

## 2020-01-09 RX ADMIN — CARVEDILOL PHOSPHATE 6.25 MILLIGRAM(S): 80 CAPSULE, EXTENDED RELEASE ORAL at 05:46

## 2020-01-09 RX ADMIN — Medication 40 MILLIGRAM(S): at 05:46

## 2020-01-09 RX ADMIN — LOSARTAN POTASSIUM 50 MILLIGRAM(S): 100 TABLET, FILM COATED ORAL at 05:46

## 2020-01-09 RX ADMIN — Medication 40 MILLIGRAM(S): at 18:50

## 2020-01-09 RX ADMIN — HEPARIN SODIUM 5000 UNIT(S): 5000 INJECTION INTRAVENOUS; SUBCUTANEOUS at 18:50

## 2020-01-09 RX ADMIN — ALBUTEROL 2 PUFF(S): 90 AEROSOL, METERED ORAL at 19:42

## 2020-01-09 RX ADMIN — PANTOPRAZOLE SODIUM 40 MILLIGRAM(S): 20 TABLET, DELAYED RELEASE ORAL at 06:03

## 2020-01-09 RX ADMIN — Medication 0.25 MILLIGRAM(S): at 13:43

## 2020-01-09 RX ADMIN — HEPARIN SODIUM 5000 UNIT(S): 5000 INJECTION INTRAVENOUS; SUBCUTANEOUS at 05:46

## 2020-01-09 RX ADMIN — SERTRALINE 100 MILLIGRAM(S): 25 TABLET, FILM COATED ORAL at 12:51

## 2020-01-09 RX ADMIN — CARVEDILOL PHOSPHATE 6.25 MILLIGRAM(S): 80 CAPSULE, EXTENDED RELEASE ORAL at 18:50

## 2020-01-09 NOTE — PROGRESS NOTE ADULT - ASSESSMENT
This is an 84 year old female with PMHx of Dementia, CHFrEF (EF 25-30% in 6/2019), COPD, known LBBB, who presented from [facility] with Shortness of Breath    #Acute hypoxic respiratory failure    secondary to Acute on chronic systolic  CHF exacerbation    possibly complicated by COPD exacerbation.   - EKG with known LBBB   - TTE noted in 6/2019 with EF 25-30%  - CXR: bilateral patchy airspace and interstitial opacities   - Lasix converted to 40mg PO BID as per cardiology  - Continue on Coreg 6.25mg BID and Losartan 50mg Daily   -  Currently  off abx.     S/P  Azithromycin and Cefepime   - TTE ordered; doubt usefulness given TTE within last 6-7 months  - BiPAP prn   - Wean O2 as tolerated  - Daily weights, strict Is and Os    #Thrombocytopenia   - Platelets today 94 and up trending    #COPD   - Continue on Duonebs q6 PRN  - Wean O2 as tolerated  - Albuterol 2 Puffs q6  - Spiriva     #Advanced dementia with behavioral disturbance  - Continue on Zoloft 100mg QD  - Continue on Xanax 0.25mg q8 PRN for agitation     Activity: As tolerated  Diet: DASH  DVT ppx: Heparin  GI ppx: Protonix  Code Status: Full Code  DISPO: From [facility], anticipate in 24 hours    #Progress Note Handoff  Pending (specify):  PLACEMENT  Family discussion:  Disposition: LTC

## 2020-01-09 NOTE — PROGRESS NOTE ADULT - SUBJECTIVE AND OBJECTIVE BOX
OPAL MCCARTY  84y  Female      Patient is a 84y old  Female who presents with a chief complaint of Respiratory Failure (09 Jan 2020 07:43)      INTERVAL HPI/OVERNIGHT EVENTS:      ******************************* REVIEW OF SYSTEMS:**********************************************    resting in chair/ hallway comfortably.   All other review of systems negative    *********************** VITALS ******************************************    T(F): 96.7 (01-09-20 @ 15:07)  HR: 72 (01-09-20 @ 15:07) (67 - 77)  BP: 105/48 (01-09-20 @ 15:07) (105/48 - 165/70)  RR: 18 (01-09-20 @ 15:07) (18 - 18)  SpO2: --            ******************************** PHYSICAL EXAM:**************************************************  GENERAL: NAD    PSYCH: no agitation, baseline mentation  HEENT:     NERVOUS SYSTEM:  Alert & Oriented X1-2  PULMONARY: RICHARD, CTA    CARDIOVASCULAR: S1S2 RRR    GI: Soft, NT, ND; BS present.    EXTREMITIES:  2+ Peripheral Pulses, No clubbing, cyanosis, or edema    LYMPH: No lymphadenopathy noted    SKIN: No rashes or lesions    ******************************************************************************************        **************************** LABS *******************************************************                          12.8   12.57 )-----------( 94       ( 08 Jan 2020 07:02 )             37.7     01-08    140  |  94<L>  |  23<H>  ----------------------------<  149<H>  3.3<L>   |  32  |  1.0    Ca    8.9      08 Jan 2020 07:02            Lactate Trend        CAPILLARY BLOOD GLUCOSE              **************************Active Medications *******************************************  Bactrim (Unknown)  nitrofurantoin (Short breath; Hives)  sulfa drugs (Unknown)      ALBUTerol    90 MICROgram(s) HFA Inhaler 2 Puff(s) Inhalation every 6 hours  albuterol/ipratropium for Nebulization 3 milliLiter(s) Nebulizer every 6 hours PRN  ALPRAZolam 0.25 milliGRAM(s) Oral every 8 hours PRN  carvedilol 6.25 milliGRAM(s) Oral every 12 hours  furosemide    Tablet 40 milliGRAM(s) Oral two times a day  heparin  Injectable 5000 Unit(s) SubCutaneous every 12 hours  influenza   Vaccine 0.5 milliLiter(s) IntraMuscular once  losartan 50 milliGRAM(s) Oral daily  pantoprazole    Tablet 40 milliGRAM(s) Oral before breakfast  predniSONE   Tablet 40 milliGRAM(s) Oral daily  sertraline 100 milliGRAM(s) Oral daily  tiotropium 18 MICROgram(s) Capsule 1 Capsule(s) Inhalation daily      ***************************************************  RADIOLOGY & ADDITIONAL TESTS:    Imaging Personally Reviewed:  [ ] YES  [ ] NO    HEALTH ISSUES - PROBLEM Dx:

## 2020-01-09 NOTE — PROGRESS NOTE ADULT - SUBJECTIVE AND OBJECTIVE BOX
Hospital Day:  3d    Subjective:    Patient is a 84y old  Female who presents with a chief complaint of Respiratory Failure (2020 14:21)    Yesterday patient was verbally abusive towards staff and myself. She fat shamed the nurse and cursed me off as well as mocking religions. Daughter contacted and explained/updated regarding diagnosis and condition of the patient (heart failure exacerbation). Reminded family that appeasing the patient's constant requests for water will worsen her heart failure. The patient was stable overnight with no issues reported. Seen and examined at bedside this morning; stable with no acute complaints.      Past Medical Hx:   Obstructive sleep apnea  Presenile dementia with depression  History of left bundle branch block  Metabolic encephalopathy  Urinary tract infection  Dementia  Hyperlipidemia  Hypertension  COPD (chronic obstructive pulmonary disease)  Congestive heart failure    Past Sx:  S/P removal of ovarian cyst  H/O lumpectomy  History of appendectomy    Allergies:  Bactrim (Unknown)  nitrofurantoin (Short breath; Hives)  sulfa drugs (Unknown)    Current Meds:   Standng Meds:  ALBUTerol    90 MICROgram(s) HFA Inhaler 2 Puff(s) Inhalation every 6 hours  carvedilol 6.25 milliGRAM(s) Oral every 12 hours  furosemide    Tablet 40 milliGRAM(s) Oral two times a day  heparin  Injectable 5000 Unit(s) SubCutaneous every 12 hours  influenza   Vaccine 0.5 milliLiter(s) IntraMuscular once  losartan 50 milliGRAM(s) Oral daily  pantoprazole    Tablet 40 milliGRAM(s) Oral before breakfast  predniSONE   Tablet 40 milliGRAM(s) Oral daily  sertraline 100 milliGRAM(s) Oral daily  tiotropium 18 MICROgram(s) Capsule 1 Capsule(s) Inhalation daily    PRN Meds:  albuterol/ipratropium for Nebulization 3 milliLiter(s) Nebulizer every 6 hours PRN Shortness of Breath and/or Wheezing  ALPRAZolam 0.25 milliGRAM(s) Oral every 8 hours PRN Anxiety/Agitation    HOME MEDICATIONS:  aspirin 81 mg oral delayed release tablet: 1 tab(s) orally once a day  carvedilol 6.25 mg oral tablet: 1 tab(s) orally every 12 hours  furosemide 40 mg oral tablet: 1 tab(s) orally once a day  ipratropium-albuterol 0.5 mg-2.5 mg/3 mLinhalation solution: 3 milliliter(s) inhaled every 6 hours, As needed, Shortness of Breath and/or Wheezing  losartan 50 mg oral tablet: 1 tab(s) orally once a day  melatonin 5 mg oral tablet: 1 tab(s) orally once a day (at bedtime)  Zoloft 100 mg oral tablet: 1 tab(s) orally once a day      Vital Signs:   T(F): 98 (20 @ 05:07), Max: 98 (20 @ 05:07)  HR: 67 (20 @ 05:07) (67 - 77)  BP: 165/70 (20 @ 05:07) (108/58 - 165/70)  RR: 18 (20 @ 05:07) (18 - 18)  SpO2: 96% (20 @ 07:55) (96% - 96%)        Physical Exam:   GENERAL: Elderly and confused  HEENT: NCAT, PERRLA, EOMI  CHEST/LUNG: Clear to auscultation bilaterally, no wheezing, rhonchi, rales   HEART: Regular rate and rhythm; s1 s2 appreciated, No murmurs, rubs, or gallops  ABDOMEN: Soft, Nontender, Nondistended; Bowel sounds present  EXTREMITIES: Trace LE edema, Peripheral pulses 2+, No cyanosis, No clubbing  NERVOUS SYSTEM:  AAOx 0-1, Non focal    Labs:                         12.8   12.57 )-----------( 94       ( 2020 07:02 )             37.7       2020 07:02    140    |  94     |  23     ----------------------------<  149    3.3     |  32     |  1.0      Ca    8.9        2020 07:02    Serum Pro-Brain Natriuretic Peptide: 3970 pg/mL (20 @ 08:06)    Troponin <0.01, CKMB --, CK -- 20 @ 08:06    Urinalysis Basic - ( 2020 09:12 )    Color: Yellow / Appearance: Slightly Turbid / S.023 / pH: x  Gluc: x / Ketone: Trace  / Bili: Negative / Urobili: 3 mg/dL   Blood: x / Protein: 30 mg/dL / Nitrite: Negative   Leuk Esterase: Negative / RBC: 10 /HPF / WBC 3 /HPF   Sq Epi: x / Non Sq Epi: 4 /HPF / Bacteria: Negative    Culture - Blood (collected 20 @ 04:46)  Source: .Blood None  Preliminary Report (20 @ 14:02):    No growth to date.    Culture - Blood (collected 20 @ 08:45)  Source: .Blood Blood  Preliminary Report (20 @ 17:01):    No growth to date.    Culture - Blood (collected 20 @ 08:40)  Source: .Blood Blood  Preliminary Report (20 @ 17:01):    No growth to date.    Radiology:   None Today    Assessment and Plan:   This is an 84 year old female with PMHx of Dementia, CHFrEF (EF 25-30% in 2019), COPD, known LBBB, who presented from [facility] with Shortness of Breath    #Acute hypoxic respiratory failure secondary to CHF exacerbation  - EKG with known LBBB   - TTE noted in 2019 with EF 25-30%  - CXR: bilateral patchy airspace and interstitial opacities   - Lasix 40mg BID today  - Continue on Coreg 6.25mg BID and Losartan 50mg Daily   - TTE ordered; doubt usefulness given TTE within last 6-7 months  - Duplex preliminary negative   - BiPAP prn   - Wean O2 as tolerated  - Daily weights, strict Is and Os  - Monitor off antibiotics     #Thrombocytopenia   - Uptrended     #COPD   - Continue on Duonebs q6 PRN  - Wean O2 as tolerated  - Albuterol 2 Puffs q6  - Spiriva     #Advanced dementia with behavioral disturbance  - Continue on Zoloft 100mg QD  - Continue on Xanax 0.25mg q8 PRN for agitation     Activity: As tolerated  Diet: DASH  DVT ppx: Heparin  GI ppx: Protonix  Code Status: Full Code  DISPO: From [facility], medically stable for discharge

## 2020-01-10 PROCEDURE — 99232 SBSQ HOSP IP/OBS MODERATE 35: CPT

## 2020-01-10 RX ORDER — HALOPERIDOL DECANOATE 100 MG/ML
1 INJECTION INTRAMUSCULAR ONCE
Refills: 0 | Status: COMPLETED | OUTPATIENT
Start: 2020-01-10 | End: 2020-01-10

## 2020-01-10 RX ORDER — LANOLIN ALCOHOL/MO/W.PET/CERES
5 CREAM (GRAM) TOPICAL AT BEDTIME
Refills: 0 | Status: DISCONTINUED | OUTPATIENT
Start: 2020-01-10 | End: 2020-01-16

## 2020-01-10 RX ADMIN — CARVEDILOL PHOSPHATE 6.25 MILLIGRAM(S): 80 CAPSULE, EXTENDED RELEASE ORAL at 05:53

## 2020-01-10 RX ADMIN — Medication 5 MILLIGRAM(S): at 21:44

## 2020-01-10 RX ADMIN — Medication 0.25 MILLIGRAM(S): at 00:14

## 2020-01-10 RX ADMIN — Medication 40 MILLIGRAM(S): at 05:55

## 2020-01-10 RX ADMIN — Medication 0.25 MILLIGRAM(S): at 21:44

## 2020-01-10 RX ADMIN — Medication 40 MILLIGRAM(S): at 17:16

## 2020-01-10 RX ADMIN — HEPARIN SODIUM 5000 UNIT(S): 5000 INJECTION INTRAVENOUS; SUBCUTANEOUS at 05:54

## 2020-01-10 RX ADMIN — Medication 40 MILLIGRAM(S): at 05:52

## 2020-01-10 RX ADMIN — HEPARIN SODIUM 5000 UNIT(S): 5000 INJECTION INTRAVENOUS; SUBCUTANEOUS at 17:16

## 2020-01-10 RX ADMIN — LOSARTAN POTASSIUM 50 MILLIGRAM(S): 100 TABLET, FILM COATED ORAL at 05:52

## 2020-01-10 RX ADMIN — CARVEDILOL PHOSPHATE 6.25 MILLIGRAM(S): 80 CAPSULE, EXTENDED RELEASE ORAL at 17:16

## 2020-01-10 RX ADMIN — SERTRALINE 100 MILLIGRAM(S): 25 TABLET, FILM COATED ORAL at 12:35

## 2020-01-10 RX ADMIN — HALOPERIDOL DECANOATE 1 MILLIGRAM(S): 100 INJECTION INTRAMUSCULAR at 01:05

## 2020-01-10 RX ADMIN — ALBUTEROL 2 PUFF(S): 90 AEROSOL, METERED ORAL at 20:01

## 2020-01-10 RX ADMIN — PANTOPRAZOLE SODIUM 40 MILLIGRAM(S): 20 TABLET, DELAYED RELEASE ORAL at 12:34

## 2020-01-10 NOTE — PROGRESS NOTE ADULT - ASSESSMENT
This is an 84 year old female with PMHx of Dementia, CHFrEF (EF 25-30% in 6/2019), COPD, known LBBB, who presented from [facility] with Shortness of Breath    #Acute hypoxic respiratory failure    secondary to Acute on chronic systolic  CHF exacerbation    possibly complicated by COPD exacerbation.   - EKG with known LBBB   - TTE noted in 6/2019 with EF 25-30%  - CXR: bilateral patchy airspace and interstitial opacities   - Lasix converted to 40mg PO BID as per cardiology  - Continue on Coreg 6.25mg BID and Losartan 50mg Daily   -  Currently  off abx.     S/P  Azithromycin and Cefepime   - TTE 1/9/20 >> LVEF 20-25 %     Might benefit from medical optimization.     f/u with Cardio as outpt.   - BiPAP prn   - Wean O2 as tolerated  - Daily weights, strict Is and Os    #Thrombocytopenia   - Platelets  94 on 01/08   was up trending.   NO blood works today.     #COPD   - Continue on Duonebs q6 PRN  - Wean O2 as tolerated  - Albuterol 2 Puffs q6  - Spiriva     #Advanced dementia with behavioral disturbance  - Continue on Zoloft 100mg QD  - Continue on Xanax 0.25mg q8 PRN for agitation     Activity: As tolerated  Diet: DASH  DVT ppx: Heparin  GI ppx: Protonix  Code Status: Full Code  DISPO: From  [facility] / at [facility], needs long term placement./     #Progress Note Handoff  Pending (specify):  PLACEMENT  Family discussion:  Disposition: LTC

## 2020-01-10 NOTE — PROGRESS NOTE ADULT - SUBJECTIVE AND OBJECTIVE BOX
Hospital Day:  4d    Subjective:    Patient is a 84y old  Female who presents with a chief complaint of Respiratory Failure (2020 16:26)    Overnight continued to have some attention seeking behavior. Noted to be calm when someone is with her. Seen and examined at bedside. Reported no acute complaints.     Past Medical Hx:   Obstructive sleep apnea  Presenile dementia with depression  History of left bundle branch block  Metabolic encephalopathy  Urinary tract infection  Dementia  Hyperlipidemia  Hypertension  COPD (chronic obstructive pulmonary disease)  Congestive heart failure    Past Sx:  S/P removal of ovarian cyst  H/O lumpectomy  History of appendectomy    Allergies:  Bactrim (Unknown)  nitrofurantoin (Short breath; Hives)  sulfa drugs (Unknown)    Current Meds:   Standng Meds:  ALBUTerol    90 MICROgram(s) HFA Inhaler 2 Puff(s) Inhalation every 6 hours  carvedilol 6.25 milliGRAM(s) Oral every 12 hours  furosemide    Tablet 40 milliGRAM(s) Oral two times a day  heparin  Injectable 5000 Unit(s) SubCutaneous every 12 hours  influenza   Vaccine 0.5 milliLiter(s) IntraMuscular once  losartan 50 milliGRAM(s) Oral daily  pantoprazole    Tablet 40 milliGRAM(s) Oral before breakfast  predniSONE   Tablet 40 milliGRAM(s) Oral daily  sertraline 100 milliGRAM(s) Oral daily  tiotropium 18 MICROgram(s) Capsule 1 Capsule(s) Inhalation daily    PRN Meds:  albuterol/ipratropium for Nebulization 3 milliLiter(s) Nebulizer every 6 hours PRN Shortness of Breath and/or Wheezing  ALPRAZolam 0.25 milliGRAM(s) Oral every 8 hours PRN Anxiety/Agitation    HOME MEDICATIONS:  aspirin 81 mg oral delayed release tablet: 1 tab(s) orally once a day  carvedilol 6.25 mg oral tablet: 1 tab(s) orally every 12 hours  furosemide 40 mg oral tablet: 1 tab(s) orally once a day  ipratropium-albuterol 0.5 mg-2.5 mg/3 mLinhalation solution: 3 milliliter(s) inhaled every 6 hours, As needed, Shortness of Breath and/or Wheezing  losartan 50 mg oral tablet: 1 tab(s) orally once a day  melatonin 5 mg oral tablet: 1 tab(s) orally once a day (at bedtime)  Zoloft 100 mg oral tablet: 1 tab(s) orally once a day      Vital Signs:   T(F): 96.7 (01-10-20 @ 06:02), Max: 97.8 (20 @ 22:17)  HR: 69 (01-10-20 @ 06:02) (69 - 685)  BP: 143/65 (01-10-20 @ 06:02) (105/48 - 143/65)  RR: 18 (01-10-20 @ 06:02) (18 - 18)  SpO2: --    Physical Exam:   GENERAL: Elderly and confused  HEENT: NCAT, PERRLA, EOMI  CHEST/LUNG: Clear to auscultation bilaterally, no wheezing, rhonchi, rales   HEART: Regular rate and rhythm; s1 s2 appreciated, No murmurs, rubs, or gallops  ABDOMEN: Soft, Nontender, Nondistended; Bowel sounds present  EXTREMITIES: Trace LE edema, Peripheral pulses 2+, No cyanosis, No clubbing  NERVOUS SYSTEM:  AAOx 0-1, Non focal    Labs:     Serum Pro-Brain Natriuretic Peptide: 3970 pg/mL (20 @ 08:06)    Urinalysis Basic - ( 2020 09:12 )    Color: Yellow / Appearance: Slightly Turbid / S.023 / pH: x  Gluc: x / Ketone: Trace  / Bili: Negative / Urobili: 3 mg/dL   Blood: x / Protein: 30 mg/dL / Nitrite: Negative   Leuk Esterase: Negative / RBC: 10 /HPF / WBC 3 /HPF   Sq Epi: x / Non Sq Epi: 4 /HPF / Bacteria: Negative    Culture - Blood (collected 20 @ 04:46)  Source: .Blood None  Preliminary Report (20 @ 14:02):    No growth to date.    Culture - Blood (collected 20 @ 08:45)  Source: .Blood Blood  Preliminary Report (20 @ 17:01):    No growth to date.    Culture - Blood (collected 20 @ 08:40)  Source: .Blood Blood  Preliminary Report (20 @ 17:01):    No growth to date.    Radiology:   None Today    Assessment and Plan:   This is an 84 year old female with PMHx of Dementia, CHFrEF (EF 25-30% in 2019), COPD, known LBBB, who presented from [facility] with Shortness of Breath    #Acute hypoxic respiratory failure secondary to CHF exacerbation  - EKG with known LBBB   - TTE noted in 2019 with EF 25-30%  - CXR: bilateral patchy airspace and interstitial opacities   - Continue on Lasix 40mg BID  - Continue on Coreg 6.25mg BID and Losartan 50mg Daily   - TTE ordered; doubt usefulness given TTE within last 6-7 months  - Duplex preliminary negative   - BiPAP prn   - Wean O2 as tolerated; Document SpO2 during vitals checks.   - Daily weights, strict Is and Os  - Monitor off antibiotics     #Thrombocytopenia   - Uptrended     #COPD   - Continue on Duonebs q6 PRN  - Wean O2 as tolerated  - Albuterol 2 Puffs q6  - Spiriva     #Advanced dementia with behavioral disturbance  - Continue on Zoloft 100mg QD  - Continue on Xanax 0.25mg q8 PRN for agitation     Activity: As tolerated  Diet: DASH  DVT ppx: Heparin  GI ppx: Protonix  Code Status: Full Code  DISPO: From [facility], medically stable for discharge Hospital Day:  4d    Subjective:    Patient is a 84y old  Female who presents with a chief complaint of Respiratory Failure (2020 16:26)    Overnight continued to have some attention seeking behavior. Noted to be calm when someone is with her. Seen and examined at bedside. Reported no acute complaints.     Past Medical Hx:   Obstructive sleep apnea  Presenile dementia with depression  History of left bundle branch block  Metabolic encephalopathy  Urinary tract infection  Dementia  Hyperlipidemia  Hypertension  COPD (chronic obstructive pulmonary disease)  Congestive heart failure    Past Sx:  S/P removal of ovarian cyst  H/O lumpectomy  History of appendectomy    Allergies:  Bactrim (Unknown)  nitrofurantoin (Short breath; Hives)  sulfa drugs (Unknown)    Current Meds:   Standng Meds:  ALBUTerol    90 MICROgram(s) HFA Inhaler 2 Puff(s) Inhalation every 6 hours  carvedilol 6.25 milliGRAM(s) Oral every 12 hours  furosemide    Tablet 40 milliGRAM(s) Oral two times a day  heparin  Injectable 5000 Unit(s) SubCutaneous every 12 hours  influenza   Vaccine 0.5 milliLiter(s) IntraMuscular once  losartan 50 milliGRAM(s) Oral daily  pantoprazole    Tablet 40 milliGRAM(s) Oral before breakfast  predniSONE   Tablet 40 milliGRAM(s) Oral daily  sertraline 100 milliGRAM(s) Oral daily  tiotropium 18 MICROgram(s) Capsule 1 Capsule(s) Inhalation daily    PRN Meds:  albuterol/ipratropium for Nebulization 3 milliLiter(s) Nebulizer every 6 hours PRN Shortness of Breath and/or Wheezing  ALPRAZolam 0.25 milliGRAM(s) Oral every 8 hours PRN Anxiety/Agitation    HOME MEDICATIONS:  aspirin 81 mg oral delayed release tablet: 1 tab(s) orally once a day  carvedilol 6.25 mg oral tablet: 1 tab(s) orally every 12 hours  furosemide 40 mg oral tablet: 1 tab(s) orally once a day  ipratropium-albuterol 0.5 mg-2.5 mg/3 mLinhalation solution: 3 milliliter(s) inhaled every 6 hours, As needed, Shortness of Breath and/or Wheezing  losartan 50 mg oral tablet: 1 tab(s) orally once a day  melatonin 5 mg oral tablet: 1 tab(s) orally once a day (at bedtime)  Zoloft 100 mg oral tablet: 1 tab(s) orally once a day      Vital Signs:   T(F): 96.7 (01-10-20 @ 06:02), Max: 97.8 (20 @ 22:17)  HR: 69 (01-10-20 @ 06:02) (69 - 685)  BP: 143/65 (01-10-20 @ 06:02) (105/48 - 143/65)  RR: 18 (01-10-20 @ 06:02) (18 - 18)  SpO2: --    Physical Exam:   GENERAL: Elderly and confused  HEENT: NCAT, PERRLA, EOMI  CHEST/LUNG: Clear to auscultation bilaterally, no wheezing, rhonchi, rales   HEART: Regular rate and rhythm; s1 s2 appreciated, No murmurs, rubs, or gallops  ABDOMEN: Soft, Nontender, Nondistended; Bowel sounds present  EXTREMITIES: Trace LE edema, Peripheral pulses 2+, No cyanosis, No clubbing  NERVOUS SYSTEM:  AAOx 0-1, Non focal    Labs:     Serum Pro-Brain Natriuretic Peptide: 3970 pg/mL (20 @ 08:06)    Urinalysis Basic - ( 2020 09:12 )    Color: Yellow / Appearance: Slightly Turbid / S.023 / pH: x  Gluc: x / Ketone: Trace  / Bili: Negative / Urobili: 3 mg/dL   Blood: x / Protein: 30 mg/dL / Nitrite: Negative   Leuk Esterase: Negative / RBC: 10 /HPF / WBC 3 /HPF   Sq Epi: x / Non Sq Epi: 4 /HPF / Bacteria: Negative    Culture - Blood (collected 20 @ 04:46)  Source: .Blood None  Preliminary Report (20 @ 14:02):    No growth to date.    Culture - Blood (collected 20 @ 08:45)  Source: .Blood Blood  Preliminary Report (20 @ 17:01):    No growth to date.    Culture - Blood (collected 20 @ 08:40)  Source: .Blood Blood  Preliminary Report (20 @ 17:01):    No growth to date.    Radiology:   None Today    Assessment and Plan:   This is an 84 year old female with PMHx of Dementia, CHFrEF (EF 25-30% in 2019), COPD, known LBBB, who presented from [facility] with Shortness of Breath    #Acute hypoxic respiratory failure secondary to acute exacerbation of CHFrEF  - EKG with known LBBB   - TTE noted in 2019 with EF 25-30%  - CXR: bilateral patchy airspace and interstitial opacities   - Continue on Lasix 40mg BID  - Continue on Coreg 6.25mg BID and Losartan 50mg Daily   - TTE ordered; EF 25-30%. Will speak with family regarding pursuing EPS for device  - Duplex preliminary negative   - BiPAP prn   - Wean O2 as tolerated; Document SpO2 during vitals checks.   - Daily weights, strict Is and Os  - Monitor off antibiotics     #Thrombocytopenia   - Uptrended     #COPD   - Continue on Duonebs q6 PRN  - Wean O2 as tolerated  - Albuterol 2 Puffs q6  - Spiriva     #Advanced dementia with behavioral disturbance  - Continue on Zoloft 100mg QD  - Continue on Xanax 0.25mg q8 PRN for agitation     Activity: As tolerated  Diet: DASH  DVT ppx: Heparin  GI ppx: Protonix  Code Status: Full Code  DISPO: From [facility], medically stable for discharge Hospital Day:  4d    Subjective:    Patient is a 84y old  Female who presents with a chief complaint of Respiratory Failure (2020 16:26)    Overnight continued to have some attention seeking behavior. Noted to be calm when someone is with her. Seen and examined at bedside. Reported no acute complaints.     Past Medical Hx:   Obstructive sleep apnea  Presenile dementia with depression  History of left bundle branch block  Metabolic encephalopathy  Urinary tract infection  Dementia  Hyperlipidemia  Hypertension  COPD (chronic obstructive pulmonary disease)  Congestive heart failure    Past Sx:  S/P removal of ovarian cyst  H/O lumpectomy  History of appendectomy    Allergies:  Bactrim (Unknown)  nitrofurantoin (Short breath; Hives)  sulfa drugs (Unknown)    Current Meds:   Standng Meds:  ALBUTerol    90 MICROgram(s) HFA Inhaler 2 Puff(s) Inhalation every 6 hours  carvedilol 6.25 milliGRAM(s) Oral every 12 hours  furosemide    Tablet 40 milliGRAM(s) Oral two times a day  heparin  Injectable 5000 Unit(s) SubCutaneous every 12 hours  influenza   Vaccine 0.5 milliLiter(s) IntraMuscular once  losartan 50 milliGRAM(s) Oral daily  pantoprazole    Tablet 40 milliGRAM(s) Oral before breakfast  predniSONE   Tablet 40 milliGRAM(s) Oral daily  sertraline 100 milliGRAM(s) Oral daily  tiotropium 18 MICROgram(s) Capsule 1 Capsule(s) Inhalation daily    PRN Meds:  albuterol/ipratropium for Nebulization 3 milliLiter(s) Nebulizer every 6 hours PRN Shortness of Breath and/or Wheezing  ALPRAZolam 0.25 milliGRAM(s) Oral every 8 hours PRN Anxiety/Agitation    HOME MEDICATIONS:  aspirin 81 mg oral delayed release tablet: 1 tab(s) orally once a day  carvedilol 6.25 mg oral tablet: 1 tab(s) orally every 12 hours  furosemide 40 mg oral tablet: 1 tab(s) orally once a day  ipratropium-albuterol 0.5 mg-2.5 mg/3 mLinhalation solution: 3 milliliter(s) inhaled every 6 hours, As needed, Shortness of Breath and/or Wheezing  losartan 50 mg oral tablet: 1 tab(s) orally once a day  melatonin 5 mg oral tablet: 1 tab(s) orally once a day (at bedtime)  Zoloft 100 mg oral tablet: 1 tab(s) orally once a day      Vital Signs:   T(F): 96.7 (01-10-20 @ 06:02), Max: 97.8 (20 @ 22:17)  HR: 69 (01-10-20 @ 06:02) (69 - 685)  BP: 143/65 (01-10-20 @ 06:02) (105/48 - 143/65)  RR: 18 (01-10-20 @ 06:02) (18 - 18)  SpO2: --    Physical Exam:   GENERAL: Elderly and confused  HEENT: NCAT, PERRLA, EOMI  CHEST/LUNG: Clear to auscultation bilaterally, no wheezing, rhonchi, rales   HEART: Regular rate and rhythm; s1 s2 appreciated, No murmurs, rubs, or gallops  ABDOMEN: Soft, Nontender, Nondistended; Bowel sounds present  EXTREMITIES: Trace LE edema, Peripheral pulses 2+, No cyanosis, No clubbing  NERVOUS SYSTEM:  AAOx 0-1, Non focal    Labs:     Serum Pro-Brain Natriuretic Peptide: 3970 pg/mL (20 @ 08:06)    Urinalysis Basic - ( 2020 09:12 )    Color: Yellow / Appearance: Slightly Turbid / S.023 / pH: x  Gluc: x / Ketone: Trace  / Bili: Negative / Urobili: 3 mg/dL   Blood: x / Protein: 30 mg/dL / Nitrite: Negative   Leuk Esterase: Negative / RBC: 10 /HPF / WBC 3 /HPF   Sq Epi: x / Non Sq Epi: 4 /HPF / Bacteria: Negative    Culture - Blood (collected 20 @ 04:46)  Source: .Blood None  Preliminary Report (20 @ 14:02):    No growth to date.    Culture - Blood (collected 20 @ 08:45)  Source: .Blood Blood  Preliminary Report (20 @ 17:01):    No growth to date.    Culture - Blood (collected 20 @ 08:40)  Source: .Blood Blood  Preliminary Report (20 @ 17:01):    No growth to date.    Radiology:   None Today    Assessment and Plan:   This is an 84 year old female with PMHx of Dementia, CHFrEF (EF 25-30% in 2019), COPD, known LBBB, who presented from [facility] with Shortness of Breath    #Acute hypoxic respiratory failure secondary to acute exacerbation of CHFrEF  - EKG with known LBBB   - TTE noted in 2019 with EF 25-30%  - CXR: bilateral patchy airspace and interstitial opacities   - Continue on Lasix 40mg BID  - Continue on Coreg 6.25mg BID and Losartan 50mg Daily   - TTE ordered; EF 25-30%. Spoke with family regarding pursuing EPS for device; Family requested to speak with cardiol  - Duplex preliminary negative   - BiPAP prn   - Wean O2 as tolerated; Document SpO2 during vitals checks.   - Daily weights, strict Is and Os  - Monitor off antibiotics     #Thrombocytopenia   - Uptrended     #COPD   - Continue on Duonebs q6 PRN  - Wean O2 as tolerated  - Albuterol 2 Puffs q6  - Spiriva     #Advanced dementia with behavioral disturbance  - Continue on Zoloft 100mg QD  - Continue on Xanax 0.25mg q8 PRN for agitation     Activity: As tolerated  Diet: DASH  DVT ppx: Heparin  GI ppx: Protonix  Code Status: Full Code  DISPO: From [facility], medically stable for discharge

## 2020-01-10 NOTE — PROGRESS NOTE ADULT - SUBJECTIVE AND OBJECTIVE BOX
OPAL MCCARTY  84y  Female      Patient is a 84y old  Female who presents with a chief complaint of Respiratory Failure (10 Rony 2020 08:59)      INTERVAL HPI/OVERNIGHT EVENTS:      ******************************* REVIEW OF SYSTEMS:**********************************************    resting in chair. Comfortable.   All other review of systems negative    *********************** VITALS ******************************************    T(F): 96.7 (01-10-20 @ 06:02)  HR: 69 (01-10-20 @ 06:02) (69 - 685)  BP: 143/65 (01-10-20 @ 06:02) (105/48 - 143/65)  RR: 18 (01-10-20 @ 06:02) (18 - 18)  SpO2: --            ******************************** PHYSICAL EXAM:**************************************************  GENERAL: NAD    PSYCH: no agitation, baseline mentation  HEENT:     NERVOUS SYSTEM:  Alert & Oriented X1-2    PULMONARY: RICHARD, CTA    CARDIOVASCULAR: S1S2 RRR    GI: Soft, NT, ND; BS present.    EXTREMITIES:  2+ Peripheral Pulses, No clubbing, cyanosis, or edema    LYMPH: No lymphadenopathy noted    SKIN: No rashes or lesions    ******************************************************************************************        **************************** LABS *******************************************************                  Lactate Trend        CAPILLARY BLOOD GLUCOSE              **************************Active Medications *******************************************  Bactrim (Unknown)  nitrofurantoin (Short breath; Hives)  sulfa drugs (Unknown)      ALBUTerol    90 MICROgram(s) HFA Inhaler 2 Puff(s) Inhalation every 6 hours  albuterol/ipratropium for Nebulization 3 milliLiter(s) Nebulizer every 6 hours PRN  ALPRAZolam 0.25 milliGRAM(s) Oral every 8 hours PRN  carvedilol 6.25 milliGRAM(s) Oral every 12 hours  furosemide    Tablet 40 milliGRAM(s) Oral two times a day  heparin  Injectable 5000 Unit(s) SubCutaneous every 12 hours  influenza   Vaccine 0.5 milliLiter(s) IntraMuscular once  losartan 50 milliGRAM(s) Oral daily  pantoprazole    Tablet 40 milliGRAM(s) Oral before breakfast  predniSONE   Tablet 40 milliGRAM(s) Oral daily  sertraline 100 milliGRAM(s) Oral daily  tiotropium 18 MICROgram(s) Capsule 1 Capsule(s) Inhalation daily      ***************************************************  RADIOLOGY & ADDITIONAL TESTS:    Imaging Personally Reviewed:  [ ] YES  [ ] NO    HEALTH ISSUES - PROBLEM Dx:

## 2020-01-11 PROCEDURE — 99232 SBSQ HOSP IP/OBS MODERATE 35: CPT

## 2020-01-11 RX ADMIN — Medication 40 MILLIGRAM(S): at 06:13

## 2020-01-11 RX ADMIN — CARVEDILOL PHOSPHATE 6.25 MILLIGRAM(S): 80 CAPSULE, EXTENDED RELEASE ORAL at 17:20

## 2020-01-11 RX ADMIN — Medication 40 MILLIGRAM(S): at 17:20

## 2020-01-11 RX ADMIN — LOSARTAN POTASSIUM 50 MILLIGRAM(S): 100 TABLET, FILM COATED ORAL at 06:13

## 2020-01-11 RX ADMIN — HEPARIN SODIUM 5000 UNIT(S): 5000 INJECTION INTRAVENOUS; SUBCUTANEOUS at 06:13

## 2020-01-11 RX ADMIN — Medication 5 MILLIGRAM(S): at 21:30

## 2020-01-11 RX ADMIN — HEPARIN SODIUM 5000 UNIT(S): 5000 INJECTION INTRAVENOUS; SUBCUTANEOUS at 17:20

## 2020-01-11 RX ADMIN — SERTRALINE 100 MILLIGRAM(S): 25 TABLET, FILM COATED ORAL at 11:15

## 2020-01-11 RX ADMIN — CARVEDILOL PHOSPHATE 6.25 MILLIGRAM(S): 80 CAPSULE, EXTENDED RELEASE ORAL at 06:13

## 2020-01-11 RX ADMIN — Medication 0.25 MILLIGRAM(S): at 21:30

## 2020-01-11 RX ADMIN — PANTOPRAZOLE SODIUM 40 MILLIGRAM(S): 20 TABLET, DELAYED RELEASE ORAL at 06:13

## 2020-01-11 NOTE — PROGRESS NOTE ADULT - ASSESSMENT
This is an 84 year old female with PMHx of Dementia, CHFrEF (EF 25-30% in 6/2019), COPD, known LBBB, who presented from [facility] with Shortness of Breath    #Acute hypoxic respiratory failure    secondary to Acute on chronic systolic  CHF exacerbation    possibly complicated by COPD exacerbation.   - EKG with known LBBB   - TTE noted in 6/2019 with EF 25-30%  - CXR: bilateral patchy airspace and interstitial opacities   - Lasix converted to 40mg PO BID as per cardiology  - Continue on Coreg 6.25mg BID and Losartan 50mg Daily   -  Currently  off abx.     S/P  Azithromycin and Cefepime   - TTE 1/9/20 >> LVEF 20-25 %     Might benefit from medical optimization for now,    f/u with Cardio .   - BiPAP prn   - Wean O2 as tolerated  - Daily weights, strict Is and Os    #Thrombocytopenia   - Platelets  94 on 01/08   was up trending.   NO blood works today.     #COPD   - Continue on Duonebs q6 PRN  - Wean O2 as tolerated  - Albuterol 2 Puffs q6  - Spiriva     #Advanced dementia with behavioral disturbance  - Continue on Zoloft 100mg QD  - Continue on Xanax 0.25mg q8 PRN for agitation     Activity: As tolerated  Diet: DASH  DVT ppx: Heparin  GI ppx: Protonix  Code Status: Full Code  DISPO: From  [facility] / at [facility], needs long term placement./     #Progress Note Handoff  Pending (specify):  PLACEMENT  Family discussion:  Disposition: LTC

## 2020-01-11 NOTE — PROGRESS NOTE ADULT - SUBJECTIVE AND OBJECTIVE BOX
OPAL MCCARTY  84y  Female      Patient is a 84y old  Female who presents with a chief complaint of Respiratory Failure (10 Rony 2020 13:23)      INTERVAL HPI/OVERNIGHT EVENTS:      ******************************* REVIEW OF SYSTEMS:**********************************************      All other review of systems negative    *********************** VITALS ******************************************    T(F): 96.2 (01-11-20 @ 05:56)  HR: 76 (01-11-20 @ 05:56) (67 - 76)  BP: 162/70 (01-11-20 @ 05:56) (128/60 - 196/77)  RR: 18 (01-11-20 @ 05:56) (18 - 18)  SpO2: 93% (01-11-20 @ 09:37) (93% - 93%)            ******************************** PHYSICAL EXAM:**************************************************  GENERAL: NAD    PSYCH: no agitation, baseline mentation  HEENT:     NERVOUS SYSTEM:  Alert & Oriented X1-2    PULMONARY: RICHARD, CTA    CARDIOVASCULAR: S1S2 RRR    GI: Soft, NT, ND; BS present.    EXTREMITIES:  2+ Peripheral Pulses, No clubbing, cyanosis, or edema    LYMPH: No lymphadenopathy noted    SKIN: No rashes or lesions    ******************************************************************************************      **************************** LABS *******************************************************                  Lactate Trend        CAPILLARY BLOOD GLUCOSE              **************************Active Medications *******************************************  Bactrim (Unknown)  nitrofurantoin (Short breath; Hives)  sulfa drugs (Unknown)      ALBUTerol    90 MICROgram(s) HFA Inhaler 2 Puff(s) Inhalation every 6 hours  albuterol/ipratropium for Nebulization 3 milliLiter(s) Nebulizer every 6 hours PRN  ALPRAZolam 0.25 milliGRAM(s) Oral every 8 hours PRN  carvedilol 6.25 milliGRAM(s) Oral every 12 hours  furosemide    Tablet 40 milliGRAM(s) Oral two times a day  heparin  Injectable 5000 Unit(s) SubCutaneous every 12 hours  influenza   Vaccine 0.5 milliLiter(s) IntraMuscular once  losartan 50 milliGRAM(s) Oral daily  melatonin 5 milliGRAM(s) Oral at bedtime PRN  pantoprazole    Tablet 40 milliGRAM(s) Oral before breakfast  predniSONE   Tablet 40 milliGRAM(s) Oral daily  sertraline 100 milliGRAM(s) Oral daily  tiotropium 18 MICROgram(s) Capsule 1 Capsule(s) Inhalation daily      ***************************************************  RADIOLOGY & ADDITIONAL TESTS:    Imaging Personally Reviewed:  [ ] YES  [ ] NO    HEALTH ISSUES - PROBLEM Dx:

## 2020-01-12 LAB
CULTURE RESULTS: SIGNIFICANT CHANGE UP
SPECIMEN SOURCE: SIGNIFICANT CHANGE UP

## 2020-01-12 PROCEDURE — 99232 SBSQ HOSP IP/OBS MODERATE 35: CPT

## 2020-01-12 RX ORDER — CHLORHEXIDINE GLUCONATE 213 G/1000ML
1 SOLUTION TOPICAL
Refills: 0 | Status: DISCONTINUED | OUTPATIENT
Start: 2020-01-12 | End: 2020-01-16

## 2020-01-12 RX ADMIN — PANTOPRAZOLE SODIUM 40 MILLIGRAM(S): 20 TABLET, DELAYED RELEASE ORAL at 06:02

## 2020-01-12 RX ADMIN — HEPARIN SODIUM 5000 UNIT(S): 5000 INJECTION INTRAVENOUS; SUBCUTANEOUS at 05:39

## 2020-01-12 RX ADMIN — CARVEDILOL PHOSPHATE 6.25 MILLIGRAM(S): 80 CAPSULE, EXTENDED RELEASE ORAL at 05:38

## 2020-01-12 RX ADMIN — CHLORHEXIDINE GLUCONATE 1 APPLICATION(S): 213 SOLUTION TOPICAL at 05:38

## 2020-01-12 RX ADMIN — Medication 0.25 MILLIGRAM(S): at 21:51

## 2020-01-12 RX ADMIN — SERTRALINE 100 MILLIGRAM(S): 25 TABLET, FILM COATED ORAL at 11:48

## 2020-01-12 RX ADMIN — CARVEDILOL PHOSPHATE 6.25 MILLIGRAM(S): 80 CAPSULE, EXTENDED RELEASE ORAL at 17:36

## 2020-01-12 RX ADMIN — Medication 40 MILLIGRAM(S): at 05:38

## 2020-01-12 RX ADMIN — LOSARTAN POTASSIUM 50 MILLIGRAM(S): 100 TABLET, FILM COATED ORAL at 05:38

## 2020-01-12 RX ADMIN — Medication 40 MILLIGRAM(S): at 17:36

## 2020-01-12 RX ADMIN — TIOTROPIUM BROMIDE 1 CAPSULE(S): 18 CAPSULE ORAL; RESPIRATORY (INHALATION) at 08:48

## 2020-01-12 RX ADMIN — HEPARIN SODIUM 5000 UNIT(S): 5000 INJECTION INTRAVENOUS; SUBCUTANEOUS at 17:36

## 2020-01-12 RX ADMIN — Medication 5 MILLIGRAM(S): at 21:51

## 2020-01-12 NOTE — PROGRESS NOTE ADULT - SUBJECTIVE AND OBJECTIVE BOX
Hospital Day:  6d    Subjective:    Patient is a 84y old  Female who presents with a chief complaint of Respiratory Failure (11 Jan 2020 13:46)    No overnight events. Seen and examined at bedside. Reported no acute complaints. Sitting outside the room in the hallway asking for water. Reminded the patient that drinking water constantly has led to this issue.     Past Medical Hx:   Obstructive sleep apnea  Presenile dementia with depression  History of left bundle branch block  Metabolic encephalopathy  Urinary tract infection  Dementia  Hyperlipidemia  Hypertension  COPD (chronic obstructive pulmonary disease)  Congestive heart failure    Past Sx:  S/P removal of ovarian cyst  H/O lumpectomy  History of appendectomy    Allergies:  Bactrim (Unknown)  nitrofurantoin (Short breath; Hives)  sulfa drugs (Unknown)    Current Meds:   Standng Meds:  ALBUTerol    90 MICROgram(s) HFA Inhaler 2 Puff(s) Inhalation every 6 hours  carvedilol 6.25 milliGRAM(s) Oral every 12 hours  chlorhexidine 4% Liquid 1 Application(s) Topical <User Schedule>  furosemide    Tablet 40 milliGRAM(s) Oral two times a day  heparin  Injectable 5000 Unit(s) SubCutaneous every 12 hours  influenza   Vaccine 0.5 milliLiter(s) IntraMuscular once  losartan 50 milliGRAM(s) Oral daily  pantoprazole    Tablet 40 milliGRAM(s) Oral before breakfast  predniSONE   Tablet 40 milliGRAM(s) Oral daily  sertraline 100 milliGRAM(s) Oral daily  tiotropium 18 MICROgram(s) Capsule 1 Capsule(s) Inhalation daily    PRN Meds:  albuterol/ipratropium for Nebulization 3 milliLiter(s) Nebulizer every 6 hours PRN Shortness of Breath and/or Wheezing  ALPRAZolam 0.25 milliGRAM(s) Oral every 8 hours PRN Anxiety/Agitation  melatonin 5 milliGRAM(s) Oral at bedtime PRN Insomnia    HOME MEDICATIONS:  aspirin 81 mg oral delayed release tablet: 1 tab(s) orally once a day  carvedilol 6.25 mg oral tablet: 1 tab(s) orally every 12 hours  furosemide 40 mg oral tablet: 1 tab(s) orally once a day  ipratropium-albuterol 0.5 mg-2.5 mg/3 mLinhalation solution: 3 milliliter(s) inhaled every 6 hours, As needed, Shortness of Breath and/or Wheezing  losartan 50 mg oral tablet: 1 tab(s) orally once a day  melatonin 5 mg oral tablet: 1 tab(s) orally once a day (at bedtime)  Zoloft 100 mg oral tablet: 1 tab(s) orally once a day      Vital Signs:   T(F): 96.7 (01-12-20 @ 06:17), Max: 96.7 (01-12-20 @ 06:17)  HR: 74 (01-12-20 @ 06:17) (64 - 74)  BP: 119/76 (01-12-20 @ 06:17) (119/76 - 132/62)  RR: 17 (01-12-20 @ 06:17) (17 - 18)  SpO2: 91% (01-11-20 @ 22:02) (91% - 94%)    Physical Exam:   GENERAL: Elderly and confused  HEENT: NCAT, PERRLA, EOMI  CHEST/LUNG: Clear to auscultation bilaterally, no wheezing, rhonchi, rales   HEART: Regular rate and rhythm; s1 s2 appreciated, No murmurs, rubs, or gallops  ABDOMEN: Soft, Nontender, Nondistended; Bowel sounds present  EXTREMITIES: Trace LE edema, Peripheral pulses 2+, No cyanosis, No clubbing  NERVOUS SYSTEM:  AAOx 0-1, Non focal    Labs:     Serum Pro-Brain Natriuretic Peptide: 3970 pg/mL (01-06-20 @ 08:06)    Culture - Blood (collected 01-07-20 @ 04:46)  Source: .Blood None  Preliminary Report (01-08-20 @ 14:02):    No growth to date.    Culture - Blood (collected 01-06-20 @ 08:45)  Source: .Blood Blood  Final Report (01-11-20 @ 17:01):    No growth at 5 days.    Culture - Blood (collected 01-06-20 @ 08:40)  Source: .Blood Blood  Final Report (01-11-20 @ 17:01):    No growth at 5 days.        Radiology:   None Today    Assessment and Plan:   This is an 84 year old female with PMHx of Dementia, CHFrEF (EF 25-30% in 6/2019), COPD, known LBBB, who presented from [facility] with Shortness of Breath    #Acute hypoxic respiratory failure secondary to acute exacerbation of CHFrEF  - EKG with known LBBB   - TTE noted in 6/2019 with EF 25-30%  - CXR: bilateral patchy airspace and interstitial opacities   - Continue on Lasix 40mg BID  - Continue on Coreg 6.25mg BID and Losartan 50mg Daily   - TTE ordered; EF 25-30%. Spoke with family regarding pursuing EPS for device; Family requested to speak with cardiologist.   - Pending cardiologist follow up. Cardiology contacted on Friday.   - Duplex negative   - BiPAP prn   - Wean O2 as tolerated; Document SpO2 during vitals checks.   - Daily weights, strict Is and Os  - Monitor off antibiotics     #Thrombocytopenia   - Uptrended     #COPD   - Continue on Duonebs q6 PRN  - Wean O2 as tolerated  - Albuterol 2 Puffs q6  - Spiriva     #Advanced dementia with behavioral disturbance  - Continue on Zoloft 100mg QD  - Continue on Xanax 0.25mg q8 PRN for agitation     Activity: As tolerated  Diet: DASH  DVT ppx: Heparin  GI ppx: Protonix  Code Status: Full Code  DISPO: From [facility], medically stable for discharge

## 2020-01-12 NOTE — PROGRESS NOTE ADULT - SUBJECTIVE AND OBJECTIVE BOX
OPAL MCCARTY  84y  Female      Patient is a 84y old  Female who presents with a chief complaint of Respiratory Failure (12 Jan 2020 08:23)      INTERVAL HPI/OVERNIGHT EVENTS:      ******************************* REVIEW OF SYSTEMS:**********************************************    All other review of systems negative    *********************** VITALS ******************************************    T(F): 96.7 (01-12-20 @ 06:17)  HR: 74 (01-12-20 @ 06:17) (64 - 74)  BP: 119/76 (01-12-20 @ 06:17) (119/76 - 132/62)  RR: 17 (01-12-20 @ 06:17) (17 - 18)  SpO2: 92% (01-12-20 @ 09:10) (91% - 94%)            ******************************** PHYSICAL EXAM:**************************************************  GENERAL: NAD    PSYCH: no agitation, baseline mentation  HEENT:     NERVOUS SYSTEM:  Alert & Oriented X1-2    PULMONARY: RICHARD, CTA    CARDIOVASCULAR: S1S2 RRR    GI: Soft, NT, ND; BS present.    EXTREMITIES:  2+ Peripheral Pulses, No clubbing, cyanosis, or edema    LYMPH: No lymphadenopathy noted    SKIN: No rashes or lesions    ******************************************************************************************      **************************** LABS *******************************************************                  Lactate Trend        CAPILLARY BLOOD GLUCOSE              **************************Active Medications *******************************************  Bactrim (Unknown)  nitrofurantoin (Short breath; Hives)  sulfa drugs (Unknown)      ALBUTerol    90 MICROgram(s) HFA Inhaler 2 Puff(s) Inhalation every 6 hours  albuterol/ipratropium for Nebulization 3 milliLiter(s) Nebulizer every 6 hours PRN  ALPRAZolam 0.25 milliGRAM(s) Oral every 8 hours PRN  carvedilol 6.25 milliGRAM(s) Oral every 12 hours  chlorhexidine 4% Liquid 1 Application(s) Topical <User Schedule>  furosemide    Tablet 40 milliGRAM(s) Oral two times a day  heparin  Injectable 5000 Unit(s) SubCutaneous every 12 hours  influenza   Vaccine 0.5 milliLiter(s) IntraMuscular once  losartan 50 milliGRAM(s) Oral daily  melatonin 5 milliGRAM(s) Oral at bedtime PRN  pantoprazole    Tablet 40 milliGRAM(s) Oral before breakfast  predniSONE   Tablet 40 milliGRAM(s) Oral daily  sertraline 100 milliGRAM(s) Oral daily  tiotropium 18 MICROgram(s) Capsule 1 Capsule(s) Inhalation daily      ***************************************************  RADIOLOGY & ADDITIONAL TESTS:    Imaging Personally Reviewed:  [ ] YES  [ ] NO    HEALTH ISSUES - PROBLEM Dx:

## 2020-01-12 NOTE — PROGRESS NOTE ADULT - ASSESSMENT
This is an 84 year old female with PMHx of Dementia, CHFrEF (EF 25-30% in 6/2019), COPD, known LBBB, who presented from [facility] with Shortness of Breath    #Acute hypoxic respiratory failure    secondary to Acute on chronic systolic  CHF exacerbation    possibly complicated by COPD exacerbation.   - EKG with known LBBB   - TTE noted in 6/2019 with EF 25-30%  - CXR: bilateral patchy airspace and interstitial opacities   - Lasix converted to 40mg PO BID as per cardiology  - Continue on Coreg 6.25mg BID and Losartan 50mg Daily   -  Currently  off abx.     S/P  Azithromycin and Cefepime   - TTE 1/9/20 >> LVEF 20-25 %     Might benefit from medical optimization for now,    f/u with Cardio .   - BiPAP prn   - Wean O2 as tolerated  - Daily weights, strict Is and Os    #Thrombocytopenia   - Platelets  94 on 01/08   was up trending.   NO blood works today.     #COPD   - Continue on Duonebs q6 PRN  - Wean O2 as tolerated  - Albuterol 2 Puffs q6  - Spiriva     #Advanced dementia with behavioral disturbance  - Continue on Zoloft 100mg QD  - Continue on Xanax 0.25mg q8 PRN for agitation     Activity: As tolerated  Diet: DASH  DVT ppx: Heparin  GI ppx: Protonix  Code Status: Full Code  DISPO: From  [facility] / at [facility], needs long term placement./     #Progress Note Handoff  Pending (specify):  PLACEMENT  Family discussion:  Disposition: LTC

## 2020-01-13 PROCEDURE — 99233 SBSQ HOSP IP/OBS HIGH 50: CPT

## 2020-01-13 RX ADMIN — Medication 0.25 MILLIGRAM(S): at 12:41

## 2020-01-13 RX ADMIN — Medication 40 MILLIGRAM(S): at 17:32

## 2020-01-13 RX ADMIN — HEPARIN SODIUM 5000 UNIT(S): 5000 INJECTION INTRAVENOUS; SUBCUTANEOUS at 17:35

## 2020-01-13 RX ADMIN — ALBUTEROL 2 PUFF(S): 90 AEROSOL, METERED ORAL at 19:53

## 2020-01-13 RX ADMIN — Medication 5 MILLIGRAM(S): at 22:31

## 2020-01-13 RX ADMIN — CARVEDILOL PHOSPHATE 6.25 MILLIGRAM(S): 80 CAPSULE, EXTENDED RELEASE ORAL at 17:32

## 2020-01-13 RX ADMIN — CHLORHEXIDINE GLUCONATE 1 APPLICATION(S): 213 SOLUTION TOPICAL at 05:44

## 2020-01-13 RX ADMIN — Medication 0.25 MILLIGRAM(S): at 22:31

## 2020-01-13 RX ADMIN — SERTRALINE 100 MILLIGRAM(S): 25 TABLET, FILM COATED ORAL at 11:58

## 2020-01-13 NOTE — PROGRESS NOTE ADULT - ASSESSMENT
This is an 84 year old female with PMHx of Dementia, CHFrEF (EF 25-30% in 6/2019), COPD, known LBBB, who presented from [facility] with Shortness of Breath    #Acute hypoxic respiratory failure secondary to acute exacerbation of CHFrEF  - EKG with known LBBB   - TTE noted in 6/2019 with EF 25-30%  - CXR: bilateral patchy airspace and interstitial opacities   - Continue on Lasix 40 mg BID  - Continue on Coreg 6.25mg BID and Losartan 50mg Daily   - TTE ordered; EF 25-30%. Spoke with family regarding pursuing EPS for device; Family requested to speak with cardiologist.   - Pending cardiologist follow up. Cardiology contacted on Friday, if not seen inpatient, will have f/u OP  - Duplex negative   - BiPAP prn   - Wean O2 as tolerated; Document SpO2 during vitals checks.   - Daily weights, strict Is and Os  - Monitor off antibiotics   - Breathing well on RA today, possible d/c today    #Thrombocytopenia   - Uptrended, no labs since 1/9    #COPD   - Continue on Duonebs q6 PRN  - Wean O2 as tolerated  - Albuterol 2 Puffs q6  - Spiriva     #Advanced dementia with behavioral disturbance  - Continue on Zoloft 100mg QD  - Continue on Xanax 0.25mg q8 PRN for agitation     Activity: As tolerated  Diet: DASH  DVT ppx: Heparin  GI ppx: Protonix  Code Status: Full Code  DISPO: From [facility], medically stable for discharge

## 2020-01-13 NOTE — DIETITIAN INITIAL EVALUATION ADULT. - OTHER INFO
Pt adm for Acute hypoxic respiratory failure secondary to acute exacerbation of CHFrEF. Breathing well on RA today. Thrombocytopenia -uptrended. COPD. Advanced dementia with behavioral disturbance. medically stable for discharge

## 2020-01-13 NOTE — PROGRESS NOTE ADULT - SUBJECTIVE AND OBJECTIVE BOX
OPAL MCCARTY  84y  Female      Patient is a 84y old  Female who presents with a chief complaint of Respiratory Failure (13 Jan 2020 13:05)      INTERVAL HPI/OVERNIGHT EVENTS:      ******************************* REVIEW OF SYSTEMS:**********************************************  resting in bed.   All other review of systems negative    *********************** VITALS ******************************************    T(F): 96.2 (01-13-20 @ 15:44)  HR: 70 (01-13-20 @ 15:44) (69 - 75)  BP: 136/60 (01-13-20 @ 15:44) (114/54 - 145/64)  RR: 17 (01-13-20 @ 06:34) (17 - 20)  SpO2: 92% (01-12-20 @ 19:42) (92% - 92%)            ******************************** PHYSICAL EXAM:**************************************************  GENERAL: NAD    PSYCH: no agitation, baseline mentation  HEENT:     NERVOUS SYSTEM:  Alert & Oriented X1-2  PULMONARY: RICHARD, CTA    CARDIOVASCULAR: S1S2 RRR    GI: Soft, NT, ND; BS present.    EXTREMITIES:  2+ Peripheral Pulses, No clubbing, cyanosis, or edema    LYMPH: No lymphadenopathy noted    SKIN: No rashes or lesions    ******************************************************************************************        **************************** LABS *******************************************************                  Lactate Trend        CAPILLARY BLOOD GLUCOSE              **************************Active Medications *******************************************  Bactrim (Unknown)  nitrofurantoin (Short breath; Hives)  sulfa drugs (Unknown)      ALBUTerol    90 MICROgram(s) HFA Inhaler 2 Puff(s) Inhalation every 6 hours  albuterol/ipratropium for Nebulization 3 milliLiter(s) Nebulizer every 6 hours PRN  ALPRAZolam 0.25 milliGRAM(s) Oral every 8 hours PRN  carvedilol 6.25 milliGRAM(s) Oral every 12 hours  chlorhexidine 4% Liquid 1 Application(s) Topical <User Schedule>  furosemide    Tablet 40 milliGRAM(s) Oral two times a day  heparin  Injectable 5000 Unit(s) SubCutaneous every 12 hours  influenza   Vaccine 0.5 milliLiter(s) IntraMuscular once  losartan 50 milliGRAM(s) Oral daily  melatonin 5 milliGRAM(s) Oral at bedtime PRN  pantoprazole    Tablet 40 milliGRAM(s) Oral before breakfast  predniSONE   Tablet 40 milliGRAM(s) Oral daily  sertraline 100 milliGRAM(s) Oral daily  tiotropium 18 MICROgram(s) Capsule 1 Capsule(s) Inhalation daily      ***************************************************  RADIOLOGY & ADDITIONAL TESTS:    Imaging Personally Reviewed:  [ ] YES  [ ] NO    HEALTH ISSUES - PROBLEM Dx:

## 2020-01-13 NOTE — DIETITIAN INITIAL EVALUATION ADULT. - PHYSICAL APPEARANCE
obese/BMI 35.3 IBW: BMI 35.3 IBW: 56.8 kg Per weight records at [facility], pt weighed 95.3 kg on 11/16/19. Slight weight gain. DTI to R heel, L sacrum. No edema/obese

## 2020-01-13 NOTE — DIETITIAN INITIAL EVALUATION ADULT. - FACTORS AFF FOOD INTAKE
none Pt reports eating okay. Observed 50% of tray eaten. LBM 1/11. No GI distress. No issues chewing/swallowing. NKFA. Takes MVI daily. Followed low sodium diet at home. Agreeable to have ensure on meal trays/none

## 2020-01-13 NOTE — PROGRESS NOTE ADULT - ASSESSMENT
This is an 84 year old female with PMHx of Dementia, CHFrEF (EF 25-30% in 6/2019), COPD, known LBBB, who presented from [facility] with Shortness of Breath    #Acute hypoxic respiratory failure    secondary to Acute on chronic systolic  CHF exacerbation    possibly complicated by COPD exacerbation.      Pneumonia could not be ruled out / in because of overlying fluid overload/ CHF    Treated for possible GNR PNA.    Off abx now.     - EKG with known LBBB   - TTE noted in 6/2019 with EF 25-30%  - CXR: bilateral patchy airspace and interstitial opacities   - Lasix converted to 40mg PO BID as per cardiology  - Continue on Coreg 6.25mg BID and Losartan 50mg Daily   - TTE 1/9/20 >> LVEF 20-25 %     Might benefit from medical optimization for now,    f/u with Cardio .   - BiPAP prn   - Wean O2 as tolerated  - Daily weights, strict Is and Os    #Thrombocytopenia   - Platelets  94 on 01/08   was up trending.   NO blood works today.     #COPD   - Continue on Duonebs q6 PRN  - Wean O2 as tolerated  - Albuterol 2 Puffs q6  - Spiriva     #Advanced dementia with behavioral disturbance  - Continue on Zoloft 100mg QD  - Continue on Xanax 0.25mg q8 PRN for agitation     Activity: As tolerated  Diet: DASH  DVT ppx: Heparin  GI ppx: Protonix  Code Status: Full Code  DISPO: From  [facility] / at [facility], needs long term placement./     #Progress Note Handoff  Pending (specify):  PLACEMENT  Family discussion:  Disposition: LTC

## 2020-01-13 NOTE — DIETITIAN INITIAL EVALUATION ADULT. - ENERGY NEEDS
calorie: 1418 - 1559 kcals/day (MSJ x 1.0 - 1.1 AF for obesity)  protein: 57 - 68 gms/day (1.0 - 1.2 gm/kg IBW for obesity)  fluid: 1ml/kcal or per lIP

## 2020-01-13 NOTE — PROGRESS NOTE ADULT - SUBJECTIVE AND OBJECTIVE BOX
Hospital Day:  7d    Subjective:    Patient is a 84y old  Female who presents with a chief complaint of Respiratory Failure (12 Jan 2020 12:20)    Interval: No acute events overnight. Patient was very agitated this morning, refused her medications. Patient is alert to person and place this am but not time or situation. Has very little memory of what brought her into the hospital. Currently stable, possible d/c today.    Past Medical Hx:   Obstructive sleep apnea  Presenile dementia with depression  History of left bundle branch block  Metabolic encephalopathy  Urinary tract infection  Dementia  Hyperlipidemia  Hypertension  COPD (chronic obstructive pulmonary disease)  Congestive heart failure    Past Sx:  S/P removal of ovarian cyst  H/O lumpectomy  History of appendectomy    Allergies:  Bactrim (Unknown)  nitrofurantoin (Short breath; Hives)  sulfa drugs (Unknown)    Current Meds:   Standng Meds:  ALBUTerol    90 MICROgram(s) HFA Inhaler 2 Puff(s) Inhalation every 6 hours  carvedilol 6.25 milliGRAM(s) Oral every 12 hours  chlorhexidine 4% Liquid 1 Application(s) Topical <User Schedule>  furosemide    Tablet 40 milliGRAM(s) Oral two times a day  heparin  Injectable 5000 Unit(s) SubCutaneous every 12 hours  influenza   Vaccine 0.5 milliLiter(s) IntraMuscular once  losartan 50 milliGRAM(s) Oral daily  pantoprazole    Tablet 40 milliGRAM(s) Oral before breakfast  predniSONE   Tablet 40 milliGRAM(s) Oral daily  sertraline 100 milliGRAM(s) Oral daily  tiotropium 18 MICROgram(s) Capsule 1 Capsule(s) Inhalation daily    PRN Meds:  albuterol/ipratropium for Nebulization 3 milliLiter(s) Nebulizer every 6 hours PRN Shortness of Breath and/or Wheezing  ALPRAZolam 0.25 milliGRAM(s) Oral every 8 hours PRN Anxiety/Agitation  melatonin 5 milliGRAM(s) Oral at bedtime PRN Insomnia    HOME MEDICATIONS:  aspirin 81 mg oral delayed release tablet: 1 tab(s) orally once a day  carvedilol 6.25 mg oral tablet: 1 tab(s) orally every 12 hours  furosemide 40 mg oral tablet: 1 tab(s) orally once a day  ipratropium-albuterol 0.5 mg-2.5 mg/3 mLinhalation solution: 3 milliliter(s) inhaled every 6 hours, As needed, Shortness of Breath and/or Wheezing  losartan 50 mg oral tablet: 1 tab(s) orally once a day  melatonin 5 mg oral tablet: 1 tab(s) orally once a day (at bedtime)  Zoloft 100 mg oral tablet: 1 tab(s) orally once a day      Vital Signs:   T(F): 97.8 (01-13-20 @ 06:34), Max: 97.8 (01-13-20 @ 06:34)  HR: 75 (01-13-20 @ 06:34) (69 - 75)  BP: 145/64 (01-13-20 @ 06:34) (114/54 - 145/64)  RR: 17 (01-13-20 @ 06:34) (17 - 20)  SpO2: 92% (01-12-20 @ 19:42) (92% - 92%)        Physical Exam:   GENERAL: NAD  HEENT: NCAT  CHEST/LUNG: CTAB  HEART: Regular rate and rhythm; s1 s2 appreciated  ABDOMEN: Soft, Nontender, Nondistended; Bowel sounds present  EXTREMITIES: Trace LE edema b/l  NERVOUS SYSTEM:  Alert & Oriented X2    Labs:     Serum Pro-Brain Natriuretic< from: [facility] Duplex Lower Ext Vein Scan, Bilat (01.08.20 @ 15:36) >   Peptide: 3970 pg/mL (01-06-20 @ 08:06)    Culture - Blood (collected 01-07-20 @ 04:46)  Source: .Blood None  Final Report (01-12-20 @ 14:00):    No growth at 5 days.    Radiology:      None today

## 2020-01-14 LAB
ANION GAP SERPL CALC-SCNC: 17 MMOL/L — HIGH (ref 7–14)
BASOPHILS # BLD AUTO: 0.02 K/UL — SIGNIFICANT CHANGE UP (ref 0–0.2)
BASOPHILS NFR BLD AUTO: 0.3 % — SIGNIFICANT CHANGE UP (ref 0–1)
BUN SERPL-MCNC: 20 MG/DL — SIGNIFICANT CHANGE UP (ref 10–20)
CALCIUM SERPL-MCNC: 8.2 MG/DL — LOW (ref 8.5–10.1)
CHLORIDE SERPL-SCNC: 95 MMOL/L — LOW (ref 98–110)
CO2 SERPL-SCNC: 30 MMOL/L — SIGNIFICANT CHANGE UP (ref 17–32)
CREAT SERPL-MCNC: 1 MG/DL — SIGNIFICANT CHANGE UP (ref 0.7–1.5)
EOSINOPHIL # BLD AUTO: 0.01 K/UL — SIGNIFICANT CHANGE UP (ref 0–0.7)
EOSINOPHIL NFR BLD AUTO: 0.1 % — SIGNIFICANT CHANGE UP (ref 0–8)
GLUCOSE SERPL-MCNC: 213 MG/DL — HIGH (ref 70–99)
HCT VFR BLD CALC: 39.7 % — SIGNIFICANT CHANGE UP (ref 37–47)
HGB BLD-MCNC: 13.5 G/DL — SIGNIFICANT CHANGE UP (ref 12–16)
IMM GRANULOCYTES NFR BLD AUTO: 0.7 % — HIGH (ref 0.1–0.3)
LYMPHOCYTES # BLD AUTO: 2.34 K/UL — SIGNIFICANT CHANGE UP (ref 1.2–3.4)
LYMPHOCYTES # BLD AUTO: 31 % — SIGNIFICANT CHANGE UP (ref 20.5–51.1)
MAGNESIUM SERPL-MCNC: 1.8 MG/DL — SIGNIFICANT CHANGE UP (ref 1.8–2.4)
MCHC RBC-ENTMCNC: 30.1 PG — SIGNIFICANT CHANGE UP (ref 27–31)
MCHC RBC-ENTMCNC: 34 G/DL — SIGNIFICANT CHANGE UP (ref 32–37)
MCV RBC AUTO: 88.6 FL — SIGNIFICANT CHANGE UP (ref 81–99)
MONOCYTES # BLD AUTO: 0.24 K/UL — SIGNIFICANT CHANGE UP (ref 0.1–0.6)
MONOCYTES NFR BLD AUTO: 3.2 % — SIGNIFICANT CHANGE UP (ref 1.7–9.3)
NEUTROPHILS # BLD AUTO: 4.9 K/UL — SIGNIFICANT CHANGE UP (ref 1.4–6.5)
NEUTROPHILS NFR BLD AUTO: 64.7 % — SIGNIFICANT CHANGE UP (ref 42.2–75.2)
NRBC # BLD: 0 /100 WBCS — SIGNIFICANT CHANGE UP (ref 0–0)
PLATELET # BLD AUTO: 81 K/UL — LOW (ref 130–400)
POTASSIUM SERPL-MCNC: 2.9 MMOL/L — LOW (ref 3.5–5)
POTASSIUM SERPL-SCNC: 2.9 MMOL/L — LOW (ref 3.5–5)
RBC # BLD: 4.48 M/UL — SIGNIFICANT CHANGE UP (ref 4.2–5.4)
RBC # FLD: 14.5 % — SIGNIFICANT CHANGE UP (ref 11.5–14.5)
SODIUM SERPL-SCNC: 142 MMOL/L — SIGNIFICANT CHANGE UP (ref 135–146)
WBC # BLD: 7.56 K/UL — SIGNIFICANT CHANGE UP (ref 4.8–10.8)
WBC # FLD AUTO: 7.56 K/UL — SIGNIFICANT CHANGE UP (ref 4.8–10.8)

## 2020-01-14 PROCEDURE — 99232 SBSQ HOSP IP/OBS MODERATE 35: CPT

## 2020-01-14 RX ORDER — ALPRAZOLAM 0.25 MG
0.25 TABLET ORAL ONCE
Refills: 0 | Status: DISCONTINUED | OUTPATIENT
Start: 2020-01-14 | End: 2020-01-14

## 2020-01-14 RX ORDER — POTASSIUM CHLORIDE 20 MEQ
40 PACKET (EA) ORAL EVERY 4 HOURS
Refills: 0 | Status: COMPLETED | OUTPATIENT
Start: 2020-01-14 | End: 2020-01-15

## 2020-01-14 RX ORDER — ALPRAZOLAM 0.25 MG
0.25 TABLET ORAL EVERY 8 HOURS
Refills: 0 | Status: DISCONTINUED | OUTPATIENT
Start: 2020-01-15 | End: 2020-01-15

## 2020-01-14 RX ORDER — ACETAMINOPHEN 500 MG
650 TABLET ORAL EVERY 6 HOURS
Refills: 0 | Status: DISCONTINUED | OUTPATIENT
Start: 2020-01-14 | End: 2020-01-16

## 2020-01-14 RX ADMIN — Medication 5 MILLIGRAM(S): at 21:52

## 2020-01-14 RX ADMIN — PANTOPRAZOLE SODIUM 40 MILLIGRAM(S): 20 TABLET, DELAYED RELEASE ORAL at 05:50

## 2020-01-14 RX ADMIN — Medication 0.25 MILLIGRAM(S): at 21:52

## 2020-01-14 RX ADMIN — ALBUTEROL 2 PUFF(S): 90 AEROSOL, METERED ORAL at 08:13

## 2020-01-14 RX ADMIN — Medication 40 MILLIEQUIVALENT(S): at 21:25

## 2020-01-14 RX ADMIN — LOSARTAN POTASSIUM 50 MILLIGRAM(S): 100 TABLET, FILM COATED ORAL at 05:46

## 2020-01-14 RX ADMIN — Medication 40 MILLIEQUIVALENT(S): at 17:34

## 2020-01-14 RX ADMIN — SERTRALINE 100 MILLIGRAM(S): 25 TABLET, FILM COATED ORAL at 12:28

## 2020-01-14 RX ADMIN — Medication 40 MILLIGRAM(S): at 05:47

## 2020-01-14 RX ADMIN — Medication 40 MILLIGRAM(S): at 05:46

## 2020-01-14 RX ADMIN — ALBUTEROL 2 PUFF(S): 90 AEROSOL, METERED ORAL at 14:25

## 2020-01-14 RX ADMIN — ALBUTEROL 2 PUFF(S): 90 AEROSOL, METERED ORAL at 19:30

## 2020-01-14 RX ADMIN — Medication 40 MILLIGRAM(S): at 17:38

## 2020-01-14 RX ADMIN — CARVEDILOL PHOSPHATE 6.25 MILLIGRAM(S): 80 CAPSULE, EXTENDED RELEASE ORAL at 17:38

## 2020-01-14 RX ADMIN — CARVEDILOL PHOSPHATE 6.25 MILLIGRAM(S): 80 CAPSULE, EXTENDED RELEASE ORAL at 05:46

## 2020-01-14 NOTE — PROGRESS NOTE ADULT - ASSESSMENT
This is an 84 year old female with PMHx of Dementia, CHFrEF (EF 25-30% in 6/2019), COPD, known LBBB, who presented from [facility] with Shortness of Breath    #Acute hypoxic respiratory failure    secondary to Acute on chronic systolic  CHF exacerbation    possibly complicated by COPD exacerbation.      Pneumonia could not be ruled out / in because of overlying fluid overload/ CHF    Treated for possible GNR PNA.    Off abx now.     The patient was not septic.     - EKG with known LBBB   - TTE noted in 6/2019 with EF 25-30%  - CXR: bilateral patchy airspace and interstitial opacities   - Lasix converted to 40mg PO BID as per cardiology  - Continue on Coreg 6.25mg BID and Losartan 50mg Daily   - TTE 1/9/20 >> LVEF 20-25 %     Might benefit from medical optimization for now,    f/u with Cardio .   - BiPAP prn   - Wean O2 as tolerated  - Daily weights, strict Is and Os    #Thrombocytopenia   - Platelets  94 on 01/08   was up trending.   NO blood works today.     #COPD   - Continue on Duonebs q6 PRN  - Wean O2 as tolerated  - Albuterol 2 Puffs q6  - Spiriva     #Advanced dementia with behavioral disturbance  - Continue on Zoloft 100mg QD  - Continue on Xanax 0.25mg q8 PRN for agitation     Activity: As tolerated  Diet: DASH  DVT ppx: Heparin  GI ppx: Protonix  Code Status: Full Code  DISPO: From  [facility] / at [facility], needs long term placement./     #Progress Note Handoff  Pending (specify):  PLACEMENT  Family discussion:  Disposition: LTC

## 2020-01-14 NOTE — PROGRESS NOTE ADULT - SUBJECTIVE AND OBJECTIVE BOX
Hospital Day:  8d    Subjective:    Patient is a 84y old  Female who presents with a chief complaint of Respiratory Failure (13 Jan 2020 16:35)    Interval: No acute events overnight. Patient again, was not oriented to what brought her into/ and why she was in the hospital. Per daughter, she is "pleasantly confused" at baseline, this is not new. Currently medically stable, will repeat CBC and CMP today to follow up thrombocytopenia and hypokalemia from earlier in admission. Otherwise, awaiting placement.    Past Medical Hx:   Obstructive sleep apnea  Presenile dementia with depression  History of left bundle branch block  Metabolic encephalopathy  Urinary tract infection  Dementia  Hyperlipidemia  Hypertension  COPD (chronic obstructive pulmonary disease)  Congestive heart failure    Past Sx:  S/P removal of ovarian cyst  H/O lumpectomy  History of appendectomy    Allergies:  Bactrim (Unknown)  nitrofurantoin (Short breath; Hives)  sulfa drugs (Unknown)    Current Meds:   Standng Meds:  ALBUTerol    90 MICROgram(s) HFA Inhaler 2 Puff(s) Inhalation every 6 hours  carvedilol 6.25 milliGRAM(s) Oral every 12 hours  chlorhexidine 4% Liquid 1 Application(s) Topical <User Schedule>  furosemide    Tablet 40 milliGRAM(s) Oral two times a day  heparin  Injectable 5000 Unit(s) SubCutaneous every 12 hours  influenza   Vaccine 0.5 milliLiter(s) IntraMuscular once  losartan 50 milliGRAM(s) Oral daily  pantoprazole    Tablet 40 milliGRAM(s) Oral before breakfast  predniSONE   Tablet 40 milliGRAM(s) Oral daily  sertraline 100 milliGRAM(s) Oral daily  tiotropium 18 MICROgram(s) Capsule 1 Capsule(s) Inhalation daily    PRN Meds:  albuterol/ipratropium for Nebulization 3 milliLiter(s) Nebulizer every 6 hours PRN Shortness of Breath and/or Wheezing  ALPRAZolam 0.25 milliGRAM(s) Oral every 8 hours PRN Anxiety/Agitation  melatonin 5 milliGRAM(s) Oral at bedtime PRN Insomnia    HOME MEDICATIONS:  aspirin 81 mg oral delayed release tablet: 1 tab(s) orally once a day  carvedilol 6.25 mg oral tablet: 1 tab(s) orally every 12 hours  furosemide 40 mg oral tablet: 1 tab(s) orally once a day  ipratropium-albuterol 0.5 mg-2.5 mg/3 mLinhalation solution: 3 milliliter(s) inhaled every 6 hours, As needed, Shortness of Breath and/or Wheezing  losartan 50 mg oral tablet: 1 tab(s) orally once a day  melatonin 5 mg oral tablet: 1 tab(s) orally once a day (at bedtime)  Zoloft 100 mg oral tablet: 1 tab(s) orally once a day      Vital Signs:   T(F): 98 (01-14-20 @ 05:44), Max: 98 (01-14-20 @ 05:44)  HR: 71 (01-14-20 @ 05:44) (70 - 75)  BP: 155/81 (01-14-20 @ 05:44) (112/77 - 155/81)  RR: 18 (01-14-20 @ 05:44) (18 - 18)  SpO2: --        Physical Exam:   GENERAL: NAD  HEENT: NCAT  CHEST/LUNG: CTAB  HEART: Regular rate and rhythm; s1 s2 appreciated  ABDOMEN: Soft, Nontender, Nondistended; Bowel sounds present  EXTREMITIES: Trace LE edema b/l  NERVOUS SYSTEM:  Alert & Oriented X2    Labs:     Serum Pro-Brain Natriuretic Peptide: 3970 pg/mL (01-06-20 @ 08:06)    Radiology:     none today

## 2020-01-14 NOTE — PROGRESS NOTE ADULT - SUBJECTIVE AND OBJECTIVE BOX
OPAL MCCARTY  84y  Female      Patient is a 84y old  Female who presents with a chief complaint of Respiratory Failure (14 Jan 2020 10:44)      INTERVAL HPI/OVERNIGHT EVENTS:      ******************************* REVIEW OF SYSTEMS:**********************************************  resting in bed.   All other review of systems negative    *********************** VITALS ******************************************    T(F): 97.7 (01-14-20 @ 14:17)  HR: 69 (01-14-20 @ 14:17) (69 - 75)  BP: 105/61 (01-14-20 @ 14:17) (105/61 - 155/81)  RR: 18 (01-14-20 @ 14:17) (18 - 18)  SpO2: --            ******************************** PHYSICAL EXAM:**************************************************  GENERAL: NAD    PSYCH: no agitation, baseline mentation  HEENT:     NERVOUS SYSTEM:  Alert & Oriented X1-2    PULMONARY: RICHARD, CTA    CARDIOVASCULAR: S1S2 RRR    GI: Soft, NT, ND; BS present.    EXTREMITIES:  2+ Peripheral Pulses, No clubbing, cyanosis, or edema    LYMPH: No lymphadenopathy noted    SKIN: No rashes or lesions    ******************************************************************************************      **************************** LABS *******************************************************                          13.5   7.56  )-----------( 81       ( 14 Jan 2020 11:50 )             39.7     01-14    142  |  95<L>  |  20  ----------------------------<  213<H>  2.9<L>   |  30  |  1.0    Ca    8.2<L>      14 Jan 2020 11:50  Mg     1.8     01-14            Lactate Trend        CAPILLARY BLOOD GLUCOSE              **************************Active Medications *******************************************  Bactrim (Unknown)  nitrofurantoin (Short breath; Hives)  sulfa drugs (Unknown)      ALBUTerol    90 MICROgram(s) HFA Inhaler 2 Puff(s) Inhalation every 6 hours  albuterol/ipratropium for Nebulization 3 milliLiter(s) Nebulizer every 6 hours PRN  carvedilol 6.25 milliGRAM(s) Oral every 12 hours  chlorhexidine 4% Liquid 1 Application(s) Topical <User Schedule>  furosemide    Tablet 40 milliGRAM(s) Oral two times a day  influenza   Vaccine 0.5 milliLiter(s) IntraMuscular once  losartan 50 milliGRAM(s) Oral daily  melatonin 5 milliGRAM(s) Oral at bedtime PRN  pantoprazole    Tablet 40 milliGRAM(s) Oral before breakfast  potassium chloride    Tablet ER 40 milliEquivalent(s) Oral every 4 hours  sertraline 100 milliGRAM(s) Oral daily  tiotropium 18 MICROgram(s) Capsule 1 Capsule(s) Inhalation daily      ***************************************************  RADIOLOGY & ADDITIONAL TESTS:    Imaging Personally Reviewed:  [ ] YES  [ ] NO    HEALTH ISSUES - PROBLEM Dx:

## 2020-01-14 NOTE — PROGRESS NOTE ADULT - ASSESSMENT
This is an 84 year old female with PMHx of Dementia, CHFrEF (EF 25-30% in 6/2019), COPD, known LBBB, who presented from [facility] with Shortness of Breath    #Acute hypoxic respiratory failure secondary to acute exacerbation of CHFrEF  - EKG with known LBBB   - TTE noted in 6/2019 with EF 25-30%  - CXR: bilateral patchy airspace and interstitial opacities   - Continue on Lasix 40 mg BID  - Continue on Coreg 6.25mg BID and Losartan 50mg Daily   - TTE ordered; EF 25-30%. Spoke with family regarding pursuing EPS for device; Family requested to speak with cardiologist.   - Pending cardiologist follow up. Cardiology contacted on Friday, if not seen inpatient, will have f/u OP  - Duplex negative   - BiPAP prn   - Wean O2 as tolerated; Document SpO2 during vitals checks.   - Daily weights, strict Is and Os  - Monitor off antibiotics   - Breathing well on RA today, possible d/c today    #Thrombocytopenia   - Uptrended, no labs since 1/9  - will repeat today    #COPD   - Continue on Duonebs q6 PRN  - Wean O2 as tolerated  - Albuterol 2 Puffs q6  - Spiriva     #Hypokalemia  - will repeat CMP today    #Advanced dementia with behavioral disturbance  - Continue on Zoloft 100mg QD  - Continue on Xanax 0.25mg q8 PRN for agitation     Activity: As tolerated  Diet: DASH  DVT ppx: Heparin  GI ppx: Protonix  Code Status: Full Code  DISPO: From [facility], medically stable for discharge

## 2020-01-15 ENCOUNTER — TRANSCRIPTION ENCOUNTER (OUTPATIENT)
Age: 85
End: 2020-01-15

## 2020-01-15 LAB
ALBUMIN SERPL ELPH-MCNC: 3.6 G/DL — SIGNIFICANT CHANGE UP (ref 3.5–5.2)
ALP SERPL-CCNC: 54 U/L — SIGNIFICANT CHANGE UP (ref 30–115)
ALT FLD-CCNC: 23 U/L — SIGNIFICANT CHANGE UP (ref 0–41)
ANION GAP SERPL CALC-SCNC: 15 MMOL/L — HIGH (ref 7–14)
AST SERPL-CCNC: 19 U/L — SIGNIFICANT CHANGE UP (ref 0–41)
BASOPHILS # BLD AUTO: 0.02 K/UL — SIGNIFICANT CHANGE UP (ref 0–0.2)
BASOPHILS NFR BLD AUTO: 0.2 % — SIGNIFICANT CHANGE UP (ref 0–1)
BILIRUB SERPL-MCNC: 0.7 MG/DL — SIGNIFICANT CHANGE UP (ref 0.2–1.2)
BUN SERPL-MCNC: 26 MG/DL — HIGH (ref 10–20)
CALCIUM SERPL-MCNC: 8.3 MG/DL — LOW (ref 8.5–10.1)
CHLORIDE SERPL-SCNC: 97 MMOL/L — LOW (ref 98–110)
CO2 SERPL-SCNC: 29 MMOL/L — SIGNIFICANT CHANGE UP (ref 17–32)
CREAT SERPL-MCNC: 1 MG/DL — SIGNIFICANT CHANGE UP (ref 0.7–1.5)
EOSINOPHIL # BLD AUTO: 0.11 K/UL — SIGNIFICANT CHANGE UP (ref 0–0.7)
EOSINOPHIL NFR BLD AUTO: 1 % — SIGNIFICANT CHANGE UP (ref 0–8)
GLUCOSE SERPL-MCNC: 128 MG/DL — HIGH (ref 70–99)
HCT VFR BLD CALC: 38.1 % — SIGNIFICANT CHANGE UP (ref 37–47)
HGB BLD-MCNC: 12.7 G/DL — SIGNIFICANT CHANGE UP (ref 12–16)
IMM GRANULOCYTES NFR BLD AUTO: 0.5 % — HIGH (ref 0.1–0.3)
LYMPHOCYTES # BLD AUTO: 3.86 K/UL — HIGH (ref 1.2–3.4)
LYMPHOCYTES # BLD AUTO: 36.3 % — SIGNIFICANT CHANGE UP (ref 20.5–51.1)
MAGNESIUM SERPL-MCNC: 1.8 MG/DL — SIGNIFICANT CHANGE UP (ref 1.8–2.4)
MCHC RBC-ENTMCNC: 28.8 PG — SIGNIFICANT CHANGE UP (ref 27–31)
MCHC RBC-ENTMCNC: 33.3 G/DL — SIGNIFICANT CHANGE UP (ref 32–37)
MCV RBC AUTO: 86.4 FL — SIGNIFICANT CHANGE UP (ref 81–99)
MONOCYTES # BLD AUTO: 0.77 K/UL — HIGH (ref 0.1–0.6)
MONOCYTES NFR BLD AUTO: 7.2 % — SIGNIFICANT CHANGE UP (ref 1.7–9.3)
NEUTROPHILS # BLD AUTO: 5.83 K/UL — SIGNIFICANT CHANGE UP (ref 1.4–6.5)
NEUTROPHILS NFR BLD AUTO: 54.8 % — SIGNIFICANT CHANGE UP (ref 42.2–75.2)
NRBC # BLD: 0 /100 WBCS — SIGNIFICANT CHANGE UP (ref 0–0)
PLATELET # BLD AUTO: 84 K/UL — LOW (ref 130–400)
POTASSIUM SERPL-MCNC: 3.3 MMOL/L — LOW (ref 3.5–5)
POTASSIUM SERPL-SCNC: 3.3 MMOL/L — LOW (ref 3.5–5)
PROT SERPL-MCNC: 6.8 G/DL — SIGNIFICANT CHANGE UP (ref 6–8)
RBC # BLD: 4.41 M/UL — SIGNIFICANT CHANGE UP (ref 4.2–5.4)
RBC # FLD: 14.3 % — SIGNIFICANT CHANGE UP (ref 11.5–14.5)
SODIUM SERPL-SCNC: 141 MMOL/L — SIGNIFICANT CHANGE UP (ref 135–146)
WBC # BLD: 10.64 K/UL — SIGNIFICANT CHANGE UP (ref 4.8–10.8)
WBC # FLD AUTO: 10.64 K/UL — SIGNIFICANT CHANGE UP (ref 4.8–10.8)

## 2020-01-15 PROCEDURE — 99232 SBSQ HOSP IP/OBS MODERATE 35: CPT

## 2020-01-15 RX ORDER — FUROSEMIDE 40 MG
1 TABLET ORAL
Qty: 0 | Refills: 0 | DISCHARGE
Start: 2020-01-15

## 2020-01-15 RX ORDER — POTASSIUM CHLORIDE 20 MEQ
40 PACKET (EA) ORAL EVERY 4 HOURS
Refills: 0 | Status: COMPLETED | OUTPATIENT
Start: 2020-01-15 | End: 2020-01-15

## 2020-01-15 RX ORDER — ASPIRIN/CALCIUM CARB/MAGNESIUM 324 MG
1 TABLET ORAL
Qty: 0 | Refills: 0 | DISCHARGE

## 2020-01-15 RX ORDER — ALPRAZOLAM 0.25 MG
0.25 TABLET ORAL ONCE
Refills: 0 | Status: DISCONTINUED | OUTPATIENT
Start: 2020-01-15 | End: 2020-01-15

## 2020-01-15 RX ADMIN — TIOTROPIUM BROMIDE 1 CAPSULE(S): 18 CAPSULE ORAL; RESPIRATORY (INHALATION) at 07:59

## 2020-01-15 RX ADMIN — Medication 0.25 MILLIGRAM(S): at 22:02

## 2020-01-15 RX ADMIN — CARVEDILOL PHOSPHATE 6.25 MILLIGRAM(S): 80 CAPSULE, EXTENDED RELEASE ORAL at 05:41

## 2020-01-15 RX ADMIN — Medication 40 MILLIGRAM(S): at 17:09

## 2020-01-15 RX ADMIN — Medication 5 MILLIGRAM(S): at 22:06

## 2020-01-15 RX ADMIN — Medication 650 MILLIGRAM(S): at 01:37

## 2020-01-15 RX ADMIN — Medication 650 MILLIGRAM(S): at 00:57

## 2020-01-15 RX ADMIN — Medication 40 MILLIGRAM(S): at 05:41

## 2020-01-15 RX ADMIN — LOSARTAN POTASSIUM 50 MILLIGRAM(S): 100 TABLET, FILM COATED ORAL at 05:41

## 2020-01-15 RX ADMIN — ALBUTEROL 2 PUFF(S): 90 AEROSOL, METERED ORAL at 14:22

## 2020-01-15 RX ADMIN — ALBUTEROL 2 PUFF(S): 90 AEROSOL, METERED ORAL at 07:59

## 2020-01-15 RX ADMIN — SERTRALINE 100 MILLIGRAM(S): 25 TABLET, FILM COATED ORAL at 10:30

## 2020-01-15 RX ADMIN — PANTOPRAZOLE SODIUM 40 MILLIGRAM(S): 20 TABLET, DELAYED RELEASE ORAL at 05:41

## 2020-01-15 RX ADMIN — ALBUTEROL 2 PUFF(S): 90 AEROSOL, METERED ORAL at 19:38

## 2020-01-15 RX ADMIN — CHLORHEXIDINE GLUCONATE 1 APPLICATION(S): 213 SOLUTION TOPICAL at 05:42

## 2020-01-15 RX ADMIN — Medication 40 MILLIEQUIVALENT(S): at 14:20

## 2020-01-15 RX ADMIN — Medication 40 MILLIEQUIVALENT(S): at 01:30

## 2020-01-15 RX ADMIN — CARVEDILOL PHOSPHATE 6.25 MILLIGRAM(S): 80 CAPSULE, EXTENDED RELEASE ORAL at 17:09

## 2020-01-15 RX ADMIN — Medication 40 MILLIEQUIVALENT(S): at 10:28

## 2020-01-15 NOTE — DISCHARGE NOTE PROVIDER - NSDCCPCAREPLAN_GEN_ALL_CORE_FT
PRINCIPAL DISCHARGE DIAGNOSIS  Diagnosis: CHF exacerbation  Assessment and Plan of Treatment: During this admission you were found to have shortness of breath and were not retaining proper oxygen on admission. This was likely due to a combination of an exacerbation of your CHF and your COPD. For this your were treated with diuretics, steroids, and nebulizer treatment until you improved and were able to breath well on room air. Please continue to take your medication as instructed and follow up with your cardiologist and psychiatrist outpatient. PRINCIPAL DISCHARGE DIAGNOSIS  Diagnosis: CHF exacerbation  Assessment and Plan of Treatment: During this admission you were found to have shortness of breath and were not retaining proper oxygen on admission. This was likely due to a combination of an exacerbation of your CHF and your COPD. For this your were treated with diuretics, steroids, and nebulizer treatment until you improved and were able to breath well on room air. Please continue to take your medication as instructed.   Your echocardiogram showed a slight decrease in your cardiac output and your lasix dose was increased to 40 mg twice a day. You should not continue this dose for longer than 2 weeks without follow up. Your aspirin was also stopped as your platelets were low. Please follow up with your cardiologist within the next two weeks to better optimize your medications.      SECONDARY DISCHARGE DIAGNOSES  Diagnosis: Thrombocytopenia  Assessment and Plan of Treatment: During this admission you were noted to have low platelets. Please follow up with hematologist, Dr. Kapoor outpatient to further evaluate. PRINCIPAL DISCHARGE DIAGNOSIS  Diagnosis: CHF exacerbation  Assessment and Plan of Treatment: During this admission you were found to have shortness of breath and were not retaining proper oxygen on admission. This was likely due to a combination of an exacerbation of your CHF and your COPD. For this your were treated with diuretics, steroids, and nebulizer treatment until you improved and were able to breath well on room air. Please continue to take your medication as instructed.   Your echocardiogram showed a slight decrease in your cardiac output and your lasix dose was increased to 40 mg twice a day. You should not continue this dose for longer than 2 weeks without follow up. Your aspirin was also stopped as your platelets were low. Please follow up with your cardiologist within the next two weeks to better optimize your medications.      SECONDARY DISCHARGE DIAGNOSES  Diagnosis: Thrombocytopenia  Assessment and Plan of Treatment: During this admission you were noted to have low platelets.   PLease with your primary doctor obtain these laboratory tests:  -Vitamin B12, Folate, HepB/C serologies, REX, RF, MMA, Homocysteine level  - Please obtain US of Abdomen to evaluate spleen size  - Please also have CBC completed within one month of discharge. It platelets are low, please follow up with hematology, Dr. Farmer outpatient.

## 2020-01-15 NOTE — DISCHARGE NOTE PROVIDER - HOSPITAL COURSE
85y/o F w/ hx of demetia at baseline  CHF, COPD, known LBBB is brought in for sob that started this morning. At [facility]'s nursing home pt sat at 70s, when ems arrived sating at 80 on nc; pt placed on bipap immediately leading to drop in blood pressure. Pt vital signs consistent with sepsis on presentation. Received azithro and cefepime in ED along with IV LR bolus 1 litre. On writer's exam pt was conversive and was on NC. Will admit pt for acute on chronic hypercapnic failure along with COPD exacerbation. Patient was diuresed and treated with steroids and nebulizers, improved to breathing and saturating well on room air. TTE reflected slight reduction in EF from prior 6 months. Would benefit from OP follow     up with cardiology.    Was noted to have some thrombocytopenia during this admission. Would benefit from OP heme-onc follow up. 83y/o F w/ hx of demetia at baseline  CHF, COPD, known LBBB is brought in for sob that started this morning. At [facility]'s nursing home pt sat at 70s, when ems arrived sating at 80 on nc; pt placed on bipap immediately leading to drop in blood pressure. Pt vital signs consistent with sepsis on presentation. Received azithro and cefepime in ED along with IV LR bolus 1 litre. On writer's exam pt was conversive and was on NC. Will admit pt for acute on chronic hypercapnic failure along with COPD exacerbation. Patient was diuresed and treated with steroids and nebulizers, improved to breathing and saturating well on room air. TTE reflected slight reduction in EF from prior 6 months. Lasix was increased to 40 BID and ASA was stopped. Would benefit from OP follow up with cardiology for further bloodwork and optimization of medications.    Was noted to have some thrombocytopenia during this admission. Would benefit from OP heme-onc follow up. 83y/o F w/ hx of demetia at baseline  CHF, COPD, known LBBB is brought in for sob that started this morning. At [facility]'s nursing home pt sat at 70s, when ems arrived sating at 80 on nc; pt placed on bipap immediately leading to drop in blood pressure. Pt vital signs consistent with sepsis on presentation. Received azithro and cefepime in ED along with IV LR bolus 1 litre. On writer's exam pt was conversive and was on NC. Admitted pt for acute on chronic hypercapnic failure along with COPD exacerbation. Patient was diuresed and treated with steroids and nebulizers, improved to breathing and saturating well on room air. TTE reflected slight reduction in EF from prior 6 months. Lasix was increased to 40 BID and ASA was stopped. Would benefit from OP follow up with cardiology for further bloodwork and optimization of medications.    Was noted to have some thrombocytopenia during this admission. Would benefit from OP heme-onc follow up.    Attending Attestation:    Patient was seen independently. Latest vital signs and labs were reviewed. Case was discussed with resident in morning rounds for assessment and plan.  Patient is medically stable for discharge to home. About 32 mins spent on discharge disposition

## 2020-01-15 NOTE — DISCHARGE NOTE PROVIDER - CARE PROVIDER_API CALL
Taras Hoffman)  Internal Medicine  [address] [place] [address]  Phone: [phone]  Fax: [phone]  Follow Up Time:     Agustina Cosby (DO)  [facility] [place] [facility]  [address] [place] [address]  Phone: [phone]  Fax: [phone]  Follow Up Time:     Everett Alcazar)  Neurology  [address] [facility], [address]  [place] [address]  Phone: [phone]  Fax: [phone]  Follow Up Time:     Sinan Philip (DO)  Critical Care Medicine; Pulmonary Disease; Sleep Medicine  [address] [facility] [address] [facility], [address]  [place] [address]  Phone: [phone]  Fax: [phone]  Follow Up Time: 1 week    Forest Marks)  Cardiovascular Disease; Internal Medicine  [address] [id number]  Phone: [phone]  Fax: [phone]  Follow Up Time: 2 weeks Taras Hoffman)  Internal Medicine  [address] [place] [address]  Phone: [phone]  Fax: [phone]  Follow Up Time: 1 week    Everett Alcazra)  Neurology  [address] [facility]  [place] [address]  Phone: [phone]  Fax: [phone]  Follow Up Time:     Sinan Philip ()  Critical Care Medicine; Pulmonary Disease; Sleep Medicine  [address] [place] [address], [address]  [place] [address]  Phone: [phone]  Fax: [phone]  Follow Up Time: 2 weeks    Forest Marks)  Cardiovascular Disease; Internal Medicine  [address] [place] [address]  Phone: [phone]  Fax: [phone]  Follow Up Time: 1 week    Barrington Rhoades)  Hematology; Internal Medicine; Medical Oncology  [address] [place] [address]  Phone: [phone]  Fax: [phone]  Follow Up Time: 2 weeks Taras Hoffman)  Internal Medicine  [address] [place] [address]  Phone: [phone]  Fax: [phone]  Follow Up Time: 1 week    Everett Alcazar)  Neurology  [address] [facility], [address]  [place] [address]  Phone: [phone]  Fax: [phone]  Follow Up Time:     Sinan Philip (DO)  Critical Care Medicine; Pulmonary Disease; Sleep Medicine  [address] [facility], [address]  [place] [address]  Phone: [phone]  Fax: [phone]  Follow Up Time: 2 weeks    Forest Marks)  Cardiovascular Disease; Internal Medicine  [address] [place] [address]  Phone: [phone]  Fax: [phone]  Follow Up Time: 1 week    Kareem Farmer)  Hematology; Internal Medicine; Medical Oncology  [address] [place] [address]  Phone: [phone]  Fax: [phone]  Follow Up Time: 1 month

## 2020-01-15 NOTE — PROGRESS NOTE ADULT - ASSESSMENT
This is an 84 year old female with PMHx of Dementia, CHFrEF (EF 25-30% in 6/2019), COPD, known LBBB, who presented from [facility] with Shortness of Breath    #Acute hypoxic respiratory failure secondary to acute exacerbation of CHFrEF  - EKG with known LBBB   - TTE noted in 6/2019 with EF 25-30%  - CXR: bilateral patchy airspace and interstitial opacities   - Continue on Lasix 40 mg BID  - Continue on Coreg 6.25mg BID and Losartan 50mg Daily   - TTE ordered; EF 25-30%. Spoke with family regarding pursuing EPS for device; Family requested to speak with cardiologist.   - Pending cardiologist follow up. Cardiology contacted on Friday, if not seen inpatient, will have f/u OP  - Duplex negative   - BiPAP prn   - Wean O2 as tolerated; Document SpO2 during vitals checks.   - Daily weights, strict Is and Os  - Monitor off antibiotics   - Breathing well on RA today, pending auth    #Thrombocytopenia   - platelets were 175 on admission, then dropped to 80's-90's  - off heparin  - no platelet clumping on cbc's per lab  - will consult heme onc    #COPD   - Continue on Duonebs q6 PRN  - Wean O2 as tolerated  - Albuterol 2 Puffs q6  - Spiriva     #Hypokalemia  - 3.3 this am, will replete today  - will repeat CMP in am    #Advanced dementia with behavioral disturbance  - Continue on Zoloft 100mg QD  - Continue on Xanax 0.25mg q8 PRN for agitation     Activity: As tolerated  Diet: DASH  DVT ppx: Heparin  GI ppx: Protonix  Code Status: Full Code  DISPO: From [facility], medically stable for discharge, pending auth This is an 84 year old female with PMHx of Dementia, CHFrEF (EF 25-30% in 6/2019), COPD, known LBBB, who presented from [facility] with Shortness of Breath      #Acute hypoxic respiratory failure  secondary to Acute on chronic systolic  CHF exacerbation    possibly complicated by COPD exacerbation.   Pneumonia could not be ruled out / in because of overlying fluid overload/ CHF   Treated for possible GNR PNA.   Sepsis ruled out        #Acute hypoxic respiratory failure secondary to acute exacerbation of CHFrEF  - EKG with known LBBB   - TTE noted in 6/2019 with EF 25-30%  - CXR: bilateral patchy airspace and interstitial opacities   - Continue on Lasix 40 mg BID  - Continue on Coreg 6.25mg BID and Losartan 50mg Daily   - TTE ordered; EF 25-30%. Spoke with family regarding pursuing EPS for device; Family requested to speak with cardiologist.   - Pending cardiologist follow up. Cardiology contacted on Friday, if not seen inpatient, will have f/u OP  - Duplex negative   - BiPAP prn   - Wean O2 as tolerated; Document SpO2 during vitals checks.   - Daily weights, strict Is and Os  - Monitor off antibiotics   - Breathing well on RA today, pending auth    #Thrombocytopenia   - platelets were 175 on admission, then dropped to 80's-90's  - off heparin  - no platelet clumping on cbc's per lab  - will consult heme onc    #COPD   - Continue on Duonebs q6 PRN  - Wean O2 as tolerated  - Albuterol 2 Puffs q6  - Spiriva     #Hypokalemia  - 3.3 this am, will replete today  - will repeat CMP in am    #Advanced dementia with behavioral disturbance  - Continue on Zoloft 100mg QD  - Continue on Xanax 0.25mg q8 PRN for agitation     Activity: As tolerated  Diet: DASH  DVT ppx: Heparin  GI ppx: Protonix  Code Status: Full Code  DISPO: From [facility], medically stable for discharge, pending auth

## 2020-01-15 NOTE — DISCHARGE NOTE PROVIDER - NSDCFUSCHEDAPPT_GEN_ALL_CORE_FT
OPAL MCCARTY ; 01/27/2020 ; [facility] Neurology [address] [facility] OPAL MCCARTY ; 01/27/2020 ; [facility] Neurology [address] [facility] OPAL MCCARTY ; 01/27/2020 ; [facility] Neurology [address] [facility] OPAL MCCARTY ; 01/27/2020 ; [facility] Neurology [address] [facility]

## 2020-01-15 NOTE — DISCHARGE NOTE PROVIDER - NSDCMRMEDTOKEN_GEN_ALL_CORE_FT
aspirin 81 mg oral delayed release tablet: 1 tab(s) orally once a day  carvedilol 6.25 mg oral tablet: 1 tab(s) orally every 12 hours  furosemide 40 mg oral tablet: 1 tab(s) orally once a day  ipratropium-albuterol 0.5 mg-2.5 mg/3 mLinhalation solution: 3 milliliter(s) inhaled every 6 hours, As needed, Shortness of Breath and/or Wheezing  losartan 50 mg oral tablet: 1 tab(s) orally once a day  melatonin 5 mg oral tablet: 1 tab(s) orally once a day (at bedtime)  Zoloft 100 mg oral tablet: 1 tab(s) orally once a day carvedilol 6.25 mg oral tablet: 1 tab(s) orally every 12 hours  furosemide 40 mg oral tablet: 1 tab(s) orally 2 times a day  ipratropium-albuterol 0.5 mg-2.5 mg/3 mLinhalation solution: 3 milliliter(s) inhaled every 6 hours, As needed, Shortness of Breath and/or Wheezing  losartan 50 mg oral tablet: 1 tab(s) orally once a day  melatonin 5 mg oral tablet: 1 tab(s) orally once a day (at bedtime)  Zoloft 100 mg oral tablet: 1 tab(s) orally once a day

## 2020-01-15 NOTE — DISCHARGE NOTE PROVIDER - PROVIDER TOKENS
PROVIDER:[TOKEN:[[id number]]],PROVIDER:[TOKEN:[[id number]]],PROVIDER:[TOKEN:[[id number]]],PROVIDER:[TOKEN:[[id number]],FOLLOWUP:[1 week]],PROVIDER:[TOKEN:[[id number]],FOLLOWUP:[2 weeks]] PROVIDER:[TOKEN:[[id number]],FOLLOWUP:[1 week]],PROVIDER:[TOKEN:[[id number]]],PROVIDER:[TOKEN:[[id number]],FOLLOWUP:[2 weeks]],PROVIDER:[TOKEN:[[id number]],FOLLOWUP:[1 week]],PROVIDER:[TOKEN:[[id number]],FOLLOWUP:[2 weeks]] PROVIDER:[TOKEN:[[id number]],FOLLOWUP:[1 week]],PROVIDER:[TOKEN:[[id number]]],PROVIDER:[TOKEN:[[id number]],FOLLOWUP:[2 weeks]],PROVIDER:[TOKEN:[[id number]],FOLLOWUP:[1 week]],PROVIDER:[TOKEN:[[id number]],FOLLOWUP:[1 month]]

## 2020-01-15 NOTE — DISCHARGE NOTE PROVIDER - CARE PROVIDERS DIRECT ADDRESSES
,DirectAddress_Unknown,[email or web address],DirectAddress_Unknown,[email or web address] ,DirectAddress_Unknown,[email or web address],DirectAddress_Unknown,[email or web address] ,DirectAddress_Unknown,[email or web address],DirectAddress_Unknown,[email or web address]

## 2020-01-15 NOTE — DISCHARGE NOTE PROVIDER - NSDCFUADDINST_GEN_ALL_CORE_FT
Please follow up with your cardiologist within the next two weeks to better optimize your medications.    Please with your primary doctor obtain these laboratory tests:  -Vitamin B12, Folate, HepB/C serologies, REX, RF, MMA, Homocysteine level  - Please obtain US of Abdomen to evaluate spleen size  - Please also have CBC completed within one month of discharge. It platelets are low, please follow up with hematology, Dr. Farmer outpatient.

## 2020-01-15 NOTE — PROGRESS NOTE ADULT - SUBJECTIVE AND OBJECTIVE BOX
Hospital Day:  9d    Subjective:    Patient is a 84y old  Female who presents with a chief complaint of Respiratory Failure (14 Jan 2020 16:13)    Interval: No acute events overnight. Patient still with thrombocytopenia, despite heparin stopped and patient on SCD's. Will consult heme onc, although likely will need outpatient follow up.    Past Medical Hx:   Obstructive sleep apnea  Presenile dementia with depression  History of left bundle branch block  Metabolic encephalopathy  Urinary tract infection  Dementia  Hyperlipidemia  Hypertension  COPD (chronic obstructive pulmonary disease)  Congestive heart failure    Past Sx:  S/P removal of ovarian cyst  H/O lumpectomy  History of appendectomy    Allergies:  Bactrim (Unknown)  nitrofurantoin (Short breath; Hives)  sulfa drugs (Unknown)    Current Meds:   Standng Meds:  ALBUTerol    90 MICROgram(s) HFA Inhaler 2 Puff(s) Inhalation every 6 hours  carvedilol 6.25 milliGRAM(s) Oral every 12 hours  chlorhexidine 4% Liquid 1 Application(s) Topical <User Schedule>  furosemide    Tablet 40 milliGRAM(s) Oral two times a day  influenza   Vaccine 0.5 milliLiter(s) IntraMuscular once  losartan 50 milliGRAM(s) Oral daily  pantoprazole    Tablet 40 milliGRAM(s) Oral before breakfast  sertraline 100 milliGRAM(s) Oral daily  tiotropium 18 MICROgram(s) Capsule 1 Capsule(s) Inhalation daily    PRN Meds:  acetaminophen   Tablet .. 650 milliGRAM(s) Oral every 6 hours PRN Moderate Pain (4 - 6)  albuterol/ipratropium for Nebulization 3 milliLiter(s) Nebulizer every 6 hours PRN Shortness of Breath and/or Wheezing  ALPRAZolam 0.25 milliGRAM(s) Oral every 8 hours PRN Anxiety/Agitation  melatonin 5 milliGRAM(s) Oral at bedtime PRN Insomnia    HOME MEDICATIONS:  aspirin 81 mg oral delayed release tablet: 1 tab(s) orally once a day  carvedilol 6.25 mg oral tablet: 1 tab(s) orally every 12 hours  furosemide 40 mg oral tablet: 1 tab(s) orally once a day  ipratropium-albuterol 0.5 mg-2.5 mg/3 mLinhalation solution: 3 milliliter(s) inhaled every 6 hours, As needed, Shortness of Breath and/or Wheezing  losartan 50 mg oral tablet: 1 tab(s) orally once a day  melatonin 5 mg oral tablet: 1 tab(s) orally once a day (at bedtime)  Zoloft 100 mg oral tablet: 1 tab(s) orally once a day      Vital Signs:   T(F): 97.1 (01-15-20 @ 13:26), Max: 98 (01-15-20 @ 05:24)  HR: 65 (01-15-20 @ 13:26) (65 - 73)  BP: 109/56 (01-15-20 @ 13:26) (109/56 - 120/54)  RR: 18 (01-15-20 @ 13:26) (18 - 18)  SpO2: 95% (01-14-20 @ 21:47) (95% - 95%)        Physical Exam:   GENERAL: NAD  HEENT: NCAT  CHEST/LUNG: CTAB  HEART: Regular rate and rhythm; s1 s2 appreciated  ABDOMEN: Soft, Nontender, Nondistended; Bowel sounds present  EXTREMITIES: Trace LE edema b/l  NERVOUS SYSTEM:  Alert & Oriented X2    Labs:                         12.7   10.64 )-----------( 84       ( 15 Rony 2020 05:56 )             38.1     Neutophil% 54.8, Lymphocyte% 36.3, Monocyte% 7.2, Bands% 0.5 01-15-20 @ 05:56    15 Jan 2020 05:56    141    |  97     |  26     ----------------------------<  128    3.3     |  29     |  1.0      Ca    8.3        15 Rony 2020 05:56  Mg     1.8       15 Rony 2020 05:56    TPro  6.8    /  Alb  3.6    /  TBili  0.7    /  DBili  x      /  AST  19     /  ALT  23     /  AlkPhos  54     15 Jan 2020 05:56      Serum Pro-Brain Natriuretic Peptide: 3970 pg/mL (01-06-20 @ 08:06)      Radiology:     none today

## 2020-01-16 ENCOUNTER — TRANSCRIPTION ENCOUNTER (OUTPATIENT)
Age: 85
End: 2020-01-16

## 2020-01-16 VITALS — OXYGEN SATURATION: 97 %

## 2020-01-16 LAB
ALBUMIN SERPL ELPH-MCNC: 3.6 G/DL — SIGNIFICANT CHANGE UP (ref 3.5–5.2)
ALP SERPL-CCNC: 52 U/L — SIGNIFICANT CHANGE UP (ref 30–115)
ALT FLD-CCNC: 24 U/L — SIGNIFICANT CHANGE UP (ref 0–41)
ANION GAP SERPL CALC-SCNC: 17 MMOL/L — HIGH (ref 7–14)
AST SERPL-CCNC: 25 U/L — SIGNIFICANT CHANGE UP (ref 0–41)
BASOPHILS # BLD AUTO: 0.04 K/UL — SIGNIFICANT CHANGE UP (ref 0–0.2)
BASOPHILS NFR BLD AUTO: 0.4 % — SIGNIFICANT CHANGE UP (ref 0–1)
BILIRUB SERPL-MCNC: 0.7 MG/DL — SIGNIFICANT CHANGE UP (ref 0.2–1.2)
BUN SERPL-MCNC: 22 MG/DL — HIGH (ref 10–20)
CALCIUM SERPL-MCNC: 8.3 MG/DL — LOW (ref 8.5–10.1)
CHLORIDE SERPL-SCNC: 100 MMOL/L — SIGNIFICANT CHANGE UP (ref 98–110)
CO2 SERPL-SCNC: 22 MMOL/L — SIGNIFICANT CHANGE UP (ref 17–32)
CREAT SERPL-MCNC: 1 MG/DL — SIGNIFICANT CHANGE UP (ref 0.7–1.5)
EOSINOPHIL # BLD AUTO: 0.13 K/UL — SIGNIFICANT CHANGE UP (ref 0–0.7)
EOSINOPHIL NFR BLD AUTO: 1.4 % — SIGNIFICANT CHANGE UP (ref 0–8)
GLUCOSE SERPL-MCNC: 161 MG/DL — HIGH (ref 70–99)
HCT VFR BLD CALC: 41.3 % — SIGNIFICANT CHANGE UP (ref 37–47)
HGB BLD-MCNC: 13.7 G/DL — SIGNIFICANT CHANGE UP (ref 12–16)
IMM GRANULOCYTES NFR BLD AUTO: 0.5 % — HIGH (ref 0.1–0.3)
LYMPHOCYTES # BLD AUTO: 2.47 K/UL — SIGNIFICANT CHANGE UP (ref 1.2–3.4)
LYMPHOCYTES # BLD AUTO: 25.8 % — SIGNIFICANT CHANGE UP (ref 20.5–51.1)
MAGNESIUM SERPL-MCNC: 1.8 MG/DL — SIGNIFICANT CHANGE UP (ref 1.8–2.4)
MCHC RBC-ENTMCNC: 29.7 PG — SIGNIFICANT CHANGE UP (ref 27–31)
MCHC RBC-ENTMCNC: 33.2 G/DL — SIGNIFICANT CHANGE UP (ref 32–37)
MCV RBC AUTO: 89.6 FL — SIGNIFICANT CHANGE UP (ref 81–99)
MONOCYTES # BLD AUTO: 0.67 K/UL — HIGH (ref 0.1–0.6)
MONOCYTES NFR BLD AUTO: 7 % — SIGNIFICANT CHANGE UP (ref 1.7–9.3)
NEUTROPHILS # BLD AUTO: 6.23 K/UL — SIGNIFICANT CHANGE UP (ref 1.4–6.5)
NEUTROPHILS NFR BLD AUTO: 64.9 % — SIGNIFICANT CHANGE UP (ref 42.2–75.2)
NRBC # BLD: 0 /100 WBCS — SIGNIFICANT CHANGE UP (ref 0–0)
PLATELET # BLD AUTO: 86 K/UL — LOW (ref 130–400)
POTASSIUM SERPL-MCNC: 4.1 MMOL/L — SIGNIFICANT CHANGE UP (ref 3.5–5)
POTASSIUM SERPL-SCNC: 4.1 MMOL/L — SIGNIFICANT CHANGE UP (ref 3.5–5)
PROT SERPL-MCNC: 7 G/DL — SIGNIFICANT CHANGE UP (ref 6–8)
RBC # BLD: 4.61 M/UL — SIGNIFICANT CHANGE UP (ref 4.2–5.4)
RBC # FLD: 14.6 % — HIGH (ref 11.5–14.5)
SODIUM SERPL-SCNC: 139 MMOL/L — SIGNIFICANT CHANGE UP (ref 135–146)
WBC # BLD: 9.59 K/UL — SIGNIFICANT CHANGE UP (ref 4.8–10.8)
WBC # FLD AUTO: 9.59 K/UL — SIGNIFICANT CHANGE UP (ref 4.8–10.8)

## 2020-01-16 PROCEDURE — 99239 HOSP IP/OBS DSCHRG MGMT >30: CPT

## 2020-01-16 PROCEDURE — 99221 1ST HOSP IP/OBS SF/LOW 40: CPT

## 2020-01-16 RX ADMIN — LOSARTAN POTASSIUM 50 MILLIGRAM(S): 100 TABLET, FILM COATED ORAL at 06:16

## 2020-01-16 RX ADMIN — Medication 40 MILLIGRAM(S): at 06:17

## 2020-01-16 RX ADMIN — CARVEDILOL PHOSPHATE 6.25 MILLIGRAM(S): 80 CAPSULE, EXTENDED RELEASE ORAL at 06:16

## 2020-01-16 RX ADMIN — CARVEDILOL PHOSPHATE 6.25 MILLIGRAM(S): 80 CAPSULE, EXTENDED RELEASE ORAL at 17:46

## 2020-01-16 RX ADMIN — Medication 3 MILLILITER(S): at 13:46

## 2020-01-16 RX ADMIN — CHLORHEXIDINE GLUCONATE 1 APPLICATION(S): 213 SOLUTION TOPICAL at 06:16

## 2020-01-16 RX ADMIN — Medication 40 MILLIGRAM(S): at 17:46

## 2020-01-16 RX ADMIN — SERTRALINE 100 MILLIGRAM(S): 25 TABLET, FILM COATED ORAL at 11:28

## 2020-01-16 NOTE — CONSULT NOTE ADULT - ATTENDING COMMENTS
Acute on Chronic Systolic CHF  Acute COPD exacerbation  ?Pneumonia  DNR/DNI    EKG: ST, LBBB  CXR: PVC (improving)    Recommend:  - Continue Lasix 60 mg IV q12 today  - Change to Lasix 40 mg PO BID tomorrow  - Continue Coreg and Losartan
patient seen and examined, agree with above, SOB/ CHF cant RO underlying pneumonia, ABX, lasix, steroids, poor MICU candidate
seen/examined  smear reviewed and showed platelets count 150K    agree w/ her plan

## 2020-01-16 NOTE — PROGRESS NOTE ADULT - SUBJECTIVE AND OBJECTIVE BOX
Hospital Day:  10d    Subjective:    Patient is a 84y old  Female who presents with a chief complaint of Respiratory Failure (15 Rony 2020 15:45)    Interval: No acute events overnight. Patient pending auth/ discharge. Hematology was consulted for evaluation of thrombocytopenia, however currently platelets have remained stable. Likely will follow up outpatient for work up however initiated as patient may take a while to get authorization.    Past Medical Hx:   Obstructive sleep apnea  Presenile dementia with depression  History of left bundle branch block  Metabolic encephalopathy  Urinary tract infection  Dementia  Hyperlipidemia  Hypertension  COPD (chronic obstructive pulmonary disease)  Congestive heart failure    Past Sx:  S/P removal of ovarian cyst  H/O lumpectomy  History of appendectomy    Allergies:  Bactrim (Unknown)  nitrofurantoin (Short breath; Hives)  sulfa drugs (Unknown)    Current Meds:   Standng Meds:  ALBUTerol    90 MICROgram(s) HFA Inhaler 2 Puff(s) Inhalation every 6 hours  carvedilol 6.25 milliGRAM(s) Oral every 12 hours  chlorhexidine 4% Liquid 1 Application(s) Topical <User Schedule>  furosemide    Tablet 40 milliGRAM(s) Oral two times a day  influenza   Vaccine 0.5 milliLiter(s) IntraMuscular once  losartan 50 milliGRAM(s) Oral daily  sertraline 100 milliGRAM(s) Oral daily  tiotropium 18 MICROgram(s) Capsule 1 Capsule(s) Inhalation daily    PRN Meds:  acetaminophen   Tablet .. 650 milliGRAM(s) Oral every 6 hours PRN Moderate Pain (4 - 6)  albuterol/ipratropium for Nebulization 3 milliLiter(s) Nebulizer every 6 hours PRN Shortness of Breath and/or Wheezing  melatonin 5 milliGRAM(s) Oral at bedtime PRN Insomnia    HOME MEDICATIONS:  carvedilol 6.25 mg oral tablet: 1 tab(s) orally every 12 hours  furosemide 40 mg oral tablet: 1 tab(s) orally 2 times a day  ipratropium-albuterol 0.5 mg-2.5 mg/3 mLinhalation solution: 3 milliliter(s) inhaled every 6 hours, As needed, Shortness of Breath and/or Wheezing  losartan 50 mg oral tablet: 1 tab(s) orally once a day  melatonin 5 mg oral tablet: 1 tab(s) orally once a day (at bedtime)  Zoloft 100 mg oral tablet: 1 tab(s) orally once a day      Vital Signs:   T(F): 98 (01-16-20 @ 05:16), Max: 98 (01-16-20 @ 05:16)  HR: 74 (01-16-20 @ 05:16) (65 - 94)  BP: 145/63 (01-16-20 @ 05:16) (109/52 - 145/63)  RR: 18 (01-16-20 @ 05:16) (18 - 18)  SpO2: 97% (01-16-20 @ 07:58) (95% - 97%)      01-15-20 @ 07:01  -  01-16-20 @ 07:00  --------------------------------------------------------  IN: 480 mL / OUT: 0 mL / NET: 480 mL        Physical Exam:   GENERAL: NAD  HEENT: NCAT  CHEST/LUNG: CTAB  HEART: Regular rate and rhythm; s1 s2 appreciated  ABDOMEN: Soft, Nontender, Nondistended; Bowel sounds present  EXTREMITIES: Trace LE edema b/l  NERVOUS SYSTEM:  Alert & Oriented X2    Labs:                         12.7   10.64 )-----------( 84       ( 15 Rony 2020 05:56 )             38.1       15 Jan 2020 05:56    141    |  97     |  26     ----------------------------<  128    3.3     |  29     |  1.0      Ca    8.3        15 Rony 2020 05:56  Mg     1.8       15 Jan 2020 05:56    TPro  6.8    /  Alb  3.6    /  TBili  0.7    /  DBili  x      /  AST  19     /  ALT  23     /  AlkPhos  54     15 Jan 2020 05:56    Radiology:

## 2020-01-16 NOTE — PROGRESS NOTE ADULT - ASSESSMENT
This is an 84 year old female with PMHx of Dementia, CHFrEF (EF 25-30% in 6/2019), COPD, known LBBB, who presented from [facility] with Shortness of Breath    #Acute hypoxic respiratory failure secondary to acute exacerbation of CHFrEF  - EKG with known LBBB   - TTE noted in 6/2019 with EF 25-30%  - CXR: bilateral patchy airspace and interstitial opacities   - Continue on Lasix 40 mg BID  - Continue on Coreg 6.25mg BID and Losartan 50mg Daily   - TTE ordered; EF 25-30%. Spoke with family regarding pursuing EPS for device; Family requested to speak with cardiologist.   - Pending cardiologist follow up. Cardiology contacted on Friday, if not seen inpatient, will have f/u OP  - Duplex negative   - BiPAP prn   - Wean O2 as tolerated; Document SpO2 during vitals checks.   - Daily weights, strict Is and Os  - Monitor off antibiotics   - Breathing well on RA today, pending auth    #Thrombocytopenia   - platelets were 175 on admission, then dropped to 80's-90's  - off heparin  - no platelet clumping on cbc's per lab  - will consult heme onc    #COPD   - Continue on Duonebs q6 PRN  - Wean O2 as tolerated  - Albuterol 2 Puffs q6  - Spiriva     #Hypokalemia  - 3.3 yesterday, was repleted  - will repeat CMP at 11    #Advanced dementia with behavioral disturbance  - Continue on Zoloft 100mg QD  - Continue on Xanax 0.25mg q8 PRN for agitation     Activity: As tolerated  Diet: DASH  DVT ppx: SCD's, holding heparin bc thrombocytopenia  GI ppx: holding protonix, now off steroids, no indication, possible cause of thrombocytopenia  Code Status: Full Code  DISPO: From [facility], medically stable for discharge, pending auth This is an 84 year old female with PMHx of Dementia, CHFrEF (EF 25-30% in 6/2019), COPD, known LBBB, who presented from [facility] with Shortness of Breath    #Acute hypoxic respiratory failure secondary to acute exacerbation of CHFrEF  - EKG with known LBBB   - TTE noted in 6/2019 with EF 25-30%  - CXR: bilateral patchy airspace and interstitial opacities   - Continue on Lasix 40 mg BID  - Continue on Coreg 6.25mg BID and Losartan 50mg Daily   - TTE ordered; EF 25-30%. Spoke with family regarding pursuing EPS for device; Family requested to speak with cardiologist.   - Pending cardiologist follow up. Cardiology contacted on Friday, if not seen inpatient, will have f/u OP  - Duplex negative   - BiPAP prn   - Wean O2 as tolerated; Document SpO2 during vitals checks.   - Daily weights, strict Is and Os  - Monitor off antibiotics   - Breathing well on RA today, pending auth    #Thrombocytopenia   - platelets were 175 on admission, then dropped to 80's-90's  - off heparin  - no platelet clumping on cbc's per lab  - will consult heme onc    #COPD   - Continue on Duonebs q6 PRN  - Wean O2 as tolerated  - Albuterol 2 Puffs q6  - Spiriva     #Hypokalemia  - 3.3 yesterday, was repleted  - will repeat CMP at 11    #Advanced dementia with behavioral disturbance  - Continue on Zoloft 100mg QD  - Continue on Xanax 0.25mg q8 PRN for agitation     Activity: As tolerated  Diet: DASH  DVT ppx: SCD's, holding heparin bc thrombocytopenia  GI ppx: holding protonix, now off steroids, no indication, possible cause of thrombocytopenia  Code Status: Full Code  DISPO: From [facility], medically stable for discharge, pending auth

## 2020-01-16 NOTE — CONSULT NOTE ADULT - SUBJECTIVE AND OBJECTIVE BOX
OPAL LUL  [id number]    HISTORY OF PRESENT ILLNESS:  83y/o F w/ hx of demetia at baseline  CHF, COPD, known LBBB is brought in for sob that started this morning. At [facility]'s nursing home pt sat at 70s, when ems arrived sating at 80 on nc; pt placed on bipap immediately leading to drop in blood pressure. Pt vital signs consistent with sepsis on presentation. Received azithro and cefepime in ED along with IV LR bolus 1 litre. On writer's exam pt was conversive and was on NC. Will admit pt for acute on chronic hypercapnic failure along with COPD exacerbation. (06 Jan 2020 11:12)    PAST MEDICAL & SURGICAL HISTORY:  Obstructive sleep apnea  Presenile dementia with depression  History of left bundle branch block  Metabolic encephalopathy  Urinary tract infection  Dementia  Hyperlipidemia  Hypertension  COPD (chronic obstructive pulmonary disease)  Congestive heart failure  S/P removal of ovarian cyst  H/O lumpectomy  History of appendectomy    REVIEW OF SYMPTOMS:  Gen:  No fevers/chills, weight loss  HEENT: No blurry vision, no difficulty swallowing, no oral or nasal ulcers  CVS: No chest pain/palpitations  Resp: No SOB/wheezing  GI: No N/V/C/D/abdominal pain  MSK:  Skin: No new rashes  Neuro: No headaches    MEDICATIONS  (STANDING):  ALBUTerol    90 MICROgram(s) HFA Inhaler 2 Puff(s) Inhalation every 6 hours  carvedilol 6.25 milliGRAM(s) Oral every 12 hours  chlorhexidine 4% Liquid 1 Application(s) Topical <User Schedule>  furosemide    Tablet 40 milliGRAM(s) Oral two times a day  influenza   Vaccine 0.5 milliLiter(s) IntraMuscular once  losartan 50 milliGRAM(s) Oral daily  sertraline 100 milliGRAM(s) Oral daily  tiotropium 18 MICROgram(s) Capsule 1 Capsule(s) Inhalation daily  MEDICATIONS  (PRN):  acetaminophen   Tablet .. 650 milliGRAM(s) Oral every 6 hours PRN Moderate Pain (4 - 6)  albuterol/ipratropium for Nebulization 3 milliLiter(s) Nebulizer every 6 hours PRN Shortness of Breath and/or Wheezing  melatonin 5 milliGRAM(s) Oral at bedtime PRN Insomnia    ALLERGIES:  Bactrim (Unknown)  nitrofurantoin (Short breath; Hives)  sulfa drugs (Unknown)    SOCIAL HISTORY:    FAMILY HISTORY:  FHx: lung cancer: father    Vital Signs Last 24 Hrs  T(C): 36.7 (16 Jan 2020 05:16), Max: 36.7 (16 Jan 2020 05:16)  T(F): 98 (16 Jan 2020 05:16), Max: 98 (16 Jan 2020 05:16)  HR: 74 (16 Jan 2020 05:16) (65 - 94)  BP: 145/63 (16 Jan 2020 05:16) (109/52 - 145/63)  BP(mean): --  RR: 18 (16 Jan 2020 05:16) (18 - 18)  SpO2: 97% (16 Jan 2020 07:58) (95% - 97%)    PHYSICAL EXAMINATION  General: No apparent distress  HEENT: EOMI, MMM  CVS: +S1/S2, RRR, no murmurs/rubs/gallops  Resp: CTA b/l. No crackles/wheezing  GI: Soft, NT/ND +BS  MSK:  Neuro: AAOx3  Skin: no visible rashes    LABS:             13.7   9.59  )-----------( 86       ( 16 Jan 2020 12:09 )             41.3     01-15    141  |  97<L>  |  26<H>  ----------------------------<  128<H>  3.3<L>   |  29  |  1.0    Ca    8.3<L>      15 Rony 2020 05:56  Mg     1.8     01-15  TPro  6.8  /  Alb  3.6  /  TBili  0.7  /  DBili  x   /  AST  19  /  ALT  23  /  AlkPhos  54  01-15    RADIOLOGY & ADDITIONAL STUDIES:    #  < from: Transthoracic Echocardiogram (01.09.20 @ 09:43) >  Summary:   1. Left ventricular ejection fraction, by visual estimation, is 20 to 25%.   2.Severely decreased global left ventricular systolic function.   3. Elevated mean left atrial pressure.   4. Spectral Doppler shows impaired relaxation pattern of left ventricular myocardial filling (Grade I diastolic dysfunction).   5. Mild mitral valve regurgitation.   6. Mild thickening and calcification of the anterior and posterior mitral valve leaflets.   7. Mitral annular calcification.   8. Mild aortic regurgitation.   9. Sclerotic aortic valve with decreased opening.  < end of copied text >    # < from: Xray Chest 1 View-PORTABLE IMMEDIATE (01.06.20 @ 18:52) >  Impression:  Improving patchy bilateral airspace and interstitial opacities.  < end of copied text >    # < from: [facility] Duplex Lower Ext Vein Scan, Bilat (01.08.20 @ 15:36) >  Impression:  No evidence of deep venous thrombosis or superficial thrombophlebitis in bilateral lower extremities.  < end of copied text > OPAL LUL  [id number]    HISTORY OF PRESENT ILLNESS:  83y/o F w/ hx of demetia at baseline  CHF, COPD, known LBBB is brought in for sob that started this morning. At [facility] nursing home pt sat at 70s, when ems arrived sating at 80 on nc; pt placed on bipap immediately leading to drop in blood pressure. Pt vital signs consistent with sepsis on presentation. Received azithro and cefepime in ED along with IV LR bolus 1 litre. On writer's exam pt was conversive and was on NC. Will admit pt for acute on chronic hypercapnic failure along with COPD exacerbation. (06 Jan 2020 11:12)    PAST MEDICAL & SURGICAL HISTORY:  Obstructive sleep apnea  Presenile dementia with depression  History of left bundle branch block  Metabolic encephalopathy  Urinary tract infection  Dementia  Hyperlipidemia  Hypertension  COPD (chronic obstructive pulmonary disease)  Congestive heart failure  S/P removal of ovarian cyst  H/O lumpectomy  History of appendectomy    REVIEW OF SYMPTOMS:  Gen:  No fevers/chills, weight loss  HEENT: No blurry vision  CVS: No chest pain/palpitations  Resp: No SOB/wheezing  GI: No N/V/C/D/abdominal pain  MSK: No joint pain or muscular pain  Skin: No new rashes  Neuro: No headaches    MEDICATIONS  (STANDING):  ALBUTerol    90 MICROgram(s) HFA Inhaler 2 Puff(s) Inhalation every 6 hours  carvedilol 6.25 milliGRAM(s) Oral every 12 hours  chlorhexidine 4% Liquid 1 Application(s) Topical <User Schedule>  furosemide    Tablet 40 milliGRAM(s) Oral two times a day  influenza   Vaccine 0.5 milliLiter(s) IntraMuscular once  losartan 50 milliGRAM(s) Oral daily  sertraline 100 milliGRAM(s) Oral daily  tiotropium 18 MICROgram(s) Capsule 1 Capsule(s) Inhalation daily  MEDICATIONS  (PRN):  acetaminophen   Tablet .. 650 milliGRAM(s) Oral every 6 hours PRN Moderate Pain (4 - 6)  albuterol/ipratropium for Nebulization 3 milliLiter(s) Nebulizer every 6 hours PRN Shortness of Breath and/or Wheezing  melatonin 5 milliGRAM(s) Oral at bedtime PRN Insomnia    ALLERGIES:  Bactrim (Unknown)  nitrofurantoin (Short breath; Hives)  sulfa drugs (Unknown)    SOCIAL HISTORY: Denies current smoking, alcohol, or illicit drug usage    FAMILY HISTORY:  FHx: lung cancer: father    Vital Signs Last 24 Hrs  T(C): 36.7 (16 Jan 2020 05:16), Max: 36.7 (16 Jan 2020 05:16)  T(F): 98 (16 Jan 2020 05:16), Max: 98 (16 Jan 2020 05:16)  HR: 74 (16 Jan 2020 05:16) (65 - 94)  BP: 145/63 (16 Jan 2020 05:16) (109/52 - 145/63)  BP(mean): --  RR: 18 (16 Jan 2020 05:16) (18 - 18)  SpO2: 97% (16 Jan 2020 07:58) (95% - 97%)    PHYSICAL EXAMINATION  General: No apparent distress  HEENT: EOMI, MMM  CVS: +S1/S2, RRR, no murmurs/rubs/gallops  Resp: CTA b/l. No crackles/wheezing  GI: Soft, NT/ND +BS  MSK: no b/l le edema  Neuro: AAOx2  Skin: no visible rashes    LABS:             13.7   9.59  )-----------( 86       ( 16 Jan 2020 12:09 )             41.3     01-15    141  |  97<L>  |  26<H>  ----------------------------<  128<H>  3.3<L>   |  29  |  1.0    Ca    8.3<L>      15 Rony 2020 05:56  Mg     1.8     01-15  TPro  6.8  /  Alb  3.6  /  TBili  0.7  /  DBili  x   /  AST  19  /  ALT  23  /  AlkPhos  54  01-15    RADIOLOGY & ADDITIONAL STUDIES:    #  < from: Transthoracic Echocardiogram (01.09.20 @ 09:43) >  Summary:   1. Left ventricular ejection fraction, by visual estimation, is 20 to 25%.   2.Severely decreased global left ventricular systolic function.   3. Elevated mean left atrial pressure.   4. Spectral Doppler shows impaired relaxation pattern of left ventricular myocardial filling (Grade I diastolic dysfunction).   5. Mild mitral valve regurgitation.   6. Mild thickening and calcification of the anterior and posterior mitral valve leaflets.   7. Mitral annular calcification.   8. Mild aortic regurgitation.   9. Sclerotic aortic valve with decreased opening.  < end of copied text >    # < from: Xray Chest 1 View-PORTABLE IMMEDIATE (01.06.20 @ 18:52) >  Impression:  Improving patchy bilateral airspace and interstitial opacities.  < end of copied text >    # < from: VA Duplex Lower Ext Vein Scan, Bilat (01.08.20 @ 15:36) >  Impression:  No evidence of deep venous thrombosis or superficial thrombophlebitis in bilateral lower extremities.  < end of copied text >

## 2020-01-16 NOTE — DISCHARGE NOTE NURSING/CASE MANAGEMENT/SOCIAL WORK - PATIENT PORTAL LINK FT
You can access the FollowMyHealth Patient Portal offered by [facility] by registering at the following website: [email or web address]. By joining [employer]’s FollowMyHealth portal, you will also be able to view your health information using other applications (apps) compatible with our system.

## 2020-01-16 NOTE — PROGRESS NOTE ADULT - ATTENDING COMMENTS
Patient was seen independently. Latest vital signs and labs were reviewed. Case was discussed with resident in morning rounds.  Agree with resident's assessment & plan which was reviewed by me and modified where necessary.
Patient was seen independently. Latest vital signs and labs were reviewed. Case was discussed with resident in morning rounds.  Agree with resident's assessment & plan which was reviewed by me and modified where necessary.

## 2020-01-16 NOTE — CHART NOTE - NSCHARTNOTEFT_GEN_A_CORE
Registered Dietitian Follow-Up     Patient Profile Reviewed                           Yes [x]   No []     Nutrition History Previously Obtained        Yes [x]  No []       Pertinent Subjective Information: Family at b/s. reports fair appetite. Not a huge eater. Agreeable to ensures      Pertinent Medical Interventions: Acute hypoxic respiratory failure secondary to acute exacerbation of CHFrEF.  Breathing well on RA today. medically stable, pending authorization. Hypokalemia - repleted. Thrombocytopenia - heme/onc c/s       Diet order: DASH/TLC, 1.5 L fluid restriction      Anthropometrics:  - Ht. 165.1 cm   - Wt. 96.2 kg (1/7) no new weights   - %wt change  - BMI 35.3   - IBW     Pertinent Lab Data: (1/16) BUN 22  glucose 161      Pertinent Meds: lasix     Physical Findings:  - Appearance: confused   - GI function: LBM 1/14   - Tubes:  - Oral/Mouth cavity: n/a  - Skin: DTI to R heel, L sacrum.     Nutrition Requirements  Weight Used: dosing  96.2 kg IBW: 56.8 kg needs cont from RD initial assessment      calorie: 1418 - 1559 kcals/day (MSJ x 1.0 - 1.1 AF for obesity)  protein: 57 - 68 gms/day (1.0 - 1.2 gm/kg IBW for obesity)  fluid: 1ml/kcal or per lIP     Nutrient Intake: not meeting needs         [] Previous Nutrition Diagnosis:  Inadequate Oral Intake            [x] Ongoing          [] Resolved    [] No active nutrition diagnosis identified at this time       Nutrition Intervention meal/snack, med food supplement      Goal/Expected Outcome: Pt to consume >75% of meal trays in 4 days      Indicator/Monitoring: RD to monitor energy intake, diet order, body comp, nFPF     Recommendation: Order ensure compact BID. Cont DASH/TLC 1.5 L fluid restriction diet.

## 2020-01-16 NOTE — CONSULT NOTE ADULT - ASSESSMENT
85 yo F  PMH: Dementia, CHFrEF (EF 20-25%), COPD, known LBBB  cc: presented from [facility] with Shortness of Breath.  Hospital Course: Patient found to be in acute exacerbation of CHFrEF. Echo noted LVEF of 20-25%. Cardiology consulted. Patient diuresed with Lasix IV and transitioned to PO Lasix. Cardiology follow up pending for EP evaluation. During hospital course patient developed thrombocytopenia   Hematology consulted for evaluation of thrombocytopenia.     Assessment / plan:    # Thrombocytopenia:  - Plt Trend: 86< 84 < 81 < 94 < 87 < 175 (day of admission)  - Plt trend last admission: (11/20/19) -- 96 < 101 < 102 < 99  - Consider Drug Induced Immune thrombocytopenia: s/p administration of vanco, furosemide, and haldol   - 4 T score 2 - low probability of HIT  - As per primary team no clumping of platelets noted  - Will need to evaluate peripheral smear for giant plts - called lab to prepare 83 yo F  PMH: Dementia, CHFrEF (EF 20-25%), COPD, known LBBB  cc: presented from [facility] with Shortness of Breath.  Hospital Course: Patient found to be in acute exacerbation of CHFrEF. Echo noted LVEF of 20-25%. Cardiology consulted. Patient diuresed with Lasix IV and transitioned to PO Lasix. Cardiology follow up pending for EP evaluation. During hospital course patient developed thrombocytopenia   Hematology consulted for evaluation of thrombocytopenia.     Assessment / plan:    # Thrombocytopenia:  - Plt Trend: 86< 84 < 81 < 94 < 87 < 175 (day of admission)  - Plt trend last admission: (11/20/19) -- 96 < 101 < 102 < 99  - Consider Drug Induced Immune thrombocytopenia: s/p administration of vanco, furosemide, and haldol - low susipicon  - 4 T score 2 - low probability of HIT  - Peripheral smear reveals no clumping of giant platelets, seems to have normal plt count   - Please obtain" Vitamin B12, Folate, HepB/C serologies, REX, RF, MMA, Homocysteine level  - Please obtain US of Abdomen to evaluate spleen size  - If discharged please have CBC completed within one month of discharge. It plts low f/u with hemeonc recommended. 85 yo F  PMH: Dementia, CHFrEF (EF 20-25%), COPD, known LBBB  cc: presented from [facility] with Shortness of Breath.  Hospital Course: Patient found to be in acute exacerbation of CHFrEF. Echo noted LVEF of 20-25%. Cardiology consulted. Patient diuresed with Lasix IV and transitioned to PO Lasix. Cardiology follow up pending for EP evaluation. During hospital course patient developed thrombocytopenia   Hematology consulted for evaluation of thrombocytopenia.     Assessment / plan:    # Thrombocytopenia:  - Plt Trend: 86< 84 < 81 < 94 < 87 < 175 (day of admission)  - Plt trend last admission: (11/20/19) -- 96 < 101 < 102 < 99  - Consider Drug Induced Immune thrombocytopenia: s/p administration of vanco, furosemide, and haldol - low susipicon  - 4 T score 2 - low probability of HIT  - Peripheral smear reveals no clumping of giant platelets, seems to have normal plt count   - Please obtain" Vitamin B12, Folate, HepB/C serologies, REX, RF, MMA, Homocysteine level  - Please obtain US of Abdomen to evaluate spleen size  - If discharged please have CBC completed within one month of discharge.

## 2020-01-21 DIAGNOSIS — J96.21 ACUTE AND CHRONIC RESPIRATORY FAILURE WITH HYPOXIA: ICD-10-CM

## 2020-01-21 DIAGNOSIS — D69.6 THROMBOCYTOPENIA, UNSPECIFIED: ICD-10-CM

## 2020-01-21 DIAGNOSIS — I11.0 HYPERTENSIVE HEART DISEASE WITH HEART FAILURE: ICD-10-CM

## 2020-01-21 DIAGNOSIS — I50.23 ACUTE ON CHRONIC SYSTOLIC (CONGESTIVE) HEART FAILURE: ICD-10-CM

## 2020-01-21 DIAGNOSIS — I42.8 OTHER CARDIOMYOPATHIES: ICD-10-CM

## 2020-01-21 DIAGNOSIS — N39.0 URINARY TRACT INFECTION, SITE NOT SPECIFIED: ICD-10-CM

## 2020-01-21 DIAGNOSIS — J44.1 CHRONIC OBSTRUCTIVE PULMONARY DISEASE WITH (ACUTE) EXACERBATION: ICD-10-CM

## 2020-01-21 DIAGNOSIS — J15.6 PNEUMONIA DUE TO OTHER GRAM-NEGATIVE BACTERIA: ICD-10-CM

## 2020-01-21 DIAGNOSIS — E87.6 HYPOKALEMIA: ICD-10-CM

## 2020-01-21 DIAGNOSIS — R06.02 SHORTNESS OF BREATH: ICD-10-CM

## 2020-01-21 DIAGNOSIS — F03.91 UNSPECIFIED DEMENTIA WITH BEHAVIORAL DISTURBANCE: ICD-10-CM

## 2020-01-21 DIAGNOSIS — G47.33 OBSTRUCTIVE SLEEP APNEA (ADULT) (PEDIATRIC): ICD-10-CM

## 2020-01-27 ENCOUNTER — APPOINTMENT (OUTPATIENT)
Dept: NEUROLOGY | Facility: CLINIC | Age: 85
End: 2020-01-27
Payer: MEDICARE

## 2020-01-27 VITALS
BODY MASS INDEX: 35.68 KG/M2 | WEIGHT: 209 LBS | SYSTOLIC BLOOD PRESSURE: 117 MMHG | OXYGEN SATURATION: 93 % | DIASTOLIC BLOOD PRESSURE: 72 MMHG | HEART RATE: 64 BPM | TEMPERATURE: 98.2 F | HEIGHT: 64 IN

## 2020-01-27 DIAGNOSIS — F03.90 UNSPECIFIED DEMENTIA W/OUT BEHAVIORAL DISTURBANCE: ICD-10-CM

## 2020-01-27 PROCEDURE — 99213 OFFICE O/P EST LOW 20 MIN: CPT

## 2020-01-27 NOTE — ASSESSMENT
[FreeTextEntry1] : Advanced dementia required assistance with all ADL's.  Some agitation is present likely due to confusion about envirmonment.  Daughter was encouraged to bring articles and pictures from home that will be familiar to her and to have music played that patient is familiar with.  Behavioral strategies for helping with agitation where recommended.  Failing that consider low dose valproic acid for mood stabilization for example 125 mg bid. However I explained this can worsening gait.   At this time I don't think much will be gained by adding memantine or donepezil.\par \par Return in 6 months.

## 2020-01-27 NOTE — PHYSICAL EXAM
[FreeTextEntry1] : Sitting in wheelchair.  Very drowsy (given xanax before she came to appointment).\par Able to recognize her daughter.  Doesn't know where she is.\par Can follow simple commands.\par Apraxia of eyelid opening is noted.\par Some apraxia for brushing teeth is noted.

## 2020-01-27 NOTE — HISTORY OF PRESENT ILLNESS
[FreeTextEntry1] : Pt is 84 yof seen in 2008 originally diagnosed with dementia caused by depression, but has had progression of symptoms and past 6 months is in and out of facilities. Hospital -> [facility] -> Hospital -> [facility] -> Hospital -> [place].  Hospital admissions often for CHF and pneumonia. \par Seen last June by Dr. Chawla and described as progressive dementia with some hallucinations and paranoia. \par \par Had CT scan of head in June 2019 showed chronic ischemic changes and bilateral chronic lacunar infarct, only mildly progressed since the prior head CT June 2012.\par \par She recognizes family but doesn't know the year or her address.  Gives addresses of previous places. She has become more agitated since being at new facility.  Before patient used to be pleasantly confused now not.  \par \par Daughter started putting pictures of kids, and articles from home to her room .   She will awaken frightened not nowing where she is.    Has been getting 0.25 mg xanax to take edge off when needed.  Give her up to twice a day as needed.   Notes when [employer] music is played she gets much calmer.\par \par Last time she was in hospital was on Zoloft 50 mg  but recently put back on and increased to 100 mg.  \par \par One of her sisters, daughter states older sister had dementia in her late 80's.  \par \par When sent home from hospital given donepezil then was increased and another was given (namenda twice a day) and stopped the zoloft.

## 2020-02-05 ENCOUNTER — APPOINTMENT (OUTPATIENT)
Dept: NEUROLOGY | Facility: CLINIC | Age: 85
End: 2020-02-05

## 2020-02-05 ENCOUNTER — APPOINTMENT (OUTPATIENT)
Dept: CARDIOLOGY | Facility: CLINIC | Age: 85
End: 2020-02-05
Payer: MEDICARE

## 2020-02-05 VITALS
WEIGHT: 202 LBS | SYSTOLIC BLOOD PRESSURE: 128 MMHG | HEART RATE: 65 BPM | DIASTOLIC BLOOD PRESSURE: 80 MMHG | BODY MASS INDEX: 34.67 KG/M2

## 2020-02-05 DIAGNOSIS — I42.9 CARDIOMYOPATHY, UNSPECIFIED: ICD-10-CM

## 2020-02-05 DIAGNOSIS — I10 ESSENTIAL (PRIMARY) HYPERTENSION: ICD-10-CM

## 2020-02-05 DIAGNOSIS — I44.7 LEFT BUNDLE-BRANCH BLOCK, UNSPECIFIED: ICD-10-CM

## 2020-02-05 DIAGNOSIS — I25.10 ATHEROSCLEROTIC HEART DISEASE OF NATIVE CORONARY ARTERY W/OUT ANGINA PECTORIS: ICD-10-CM

## 2020-02-05 PROCEDURE — 99214 OFFICE O/P EST MOD 30 MIN: CPT

## 2020-02-05 PROCEDURE — 93000 ELECTROCARDIOGRAM COMPLETE: CPT

## 2020-02-05 RX ORDER — IPRATROPIUM/ALBUTEROL SULFATE 0.5-3MG/3
0.5-2.5 (3) AMPUL FOR NEBULIZATION (ML) INHALATION 4 TIMES DAILY
Refills: 0 | Status: ACTIVE | COMMUNITY

## 2020-02-05 RX ORDER — ALPRAZOLAM 0.25 MG/1
0.25 TABLET ORAL
Qty: 60 | Refills: 0 | Status: ACTIVE | COMMUNITY

## 2020-02-05 RX ORDER — FUROSEMIDE 40 MG/1
40 TABLET ORAL TWICE DAILY
Qty: 60 | Refills: 4 | Status: ACTIVE | COMMUNITY

## 2020-02-05 NOTE — PHYSICAL EXAM
[General Appearance - Well Developed] : well developed [Well Groomed] : well groomed [Normal Appearance] : normal appearance [General Appearance - Well Nourished] : well nourished [No Deformities] : no deformities [General Appearance - In No Acute Distress] : no acute distress [Normal Conjunctiva] : the conjunctiva exhibited no abnormalities [No Oral Pallor] : no oral pallor [Normal Oral Mucosa] : normal oral mucosa [Eyelids - No Xanthelasma] : the eyelids demonstrated no xanthelasmas [No Oral Cyanosis] : no oral cyanosis [Normal Jugular Venous A Waves Present] : normal jugular venous A waves present [No Jugular Venous King A Waves] : no jugular venous king A waves [Normal Jugular Venous V Waves Present] : normal jugular venous V waves present [Exaggerated Use Of Accessory Muscles For Inspiration] : no accessory muscle use [Respiration, Rhythm And Depth] : normal respiratory rhythm and effort [Heart Rate And Rhythm] : heart rate and rhythm were normal [Auscultation Breath Sounds / Voice Sounds] : lungs were clear to auscultation bilaterally [Heart Sounds] : normal S1 and S2 [Murmurs] : no murmurs present [Abdomen Tenderness] : non-tender [Abdomen Soft] : soft [Abnormal Walk] : normal gait [Abdomen Mass (___ Cm)] : no abdominal mass palpated [Gait - Sufficient For Exercise Testing] : the gait was sufficient for exercise testing [Cyanosis, Localized] : no localized cyanosis [Nail Clubbing] : no clubbing of the fingernails [Petechial Hemorrhages (___cm)] : no petechial hemorrhages [Skin Color & Pigmentation] : normal skin color and pigmentation [] : no rash [No Venous Stasis] : no venous stasis [Skin Lesions] : no skin lesions [No Skin Ulcers] : no skin ulcer [No Xanthoma] : no  xanthoma was observed [Oriented To Time, Place, And Person] : oriented to person, place, and time [Affect] : the affect was normal [Mood] : the mood was normal [No Anxiety] : not feeling anxious

## 2020-02-18 ENCOUNTER — APPOINTMENT (OUTPATIENT)
Dept: HEMATOLOGY ONCOLOGY | Facility: CLINIC | Age: 85
End: 2020-02-18

## 2020-03-01 PROCEDURE — G9005: CPT

## 2020-03-18 NOTE — ED ADULT NURSE NOTE - SEPSIS REFERENCE DATA CRITERIA 2
Quality 431: Preventive Care And Screening: Unhealthy Alcohol Use - Screening: Patient screened for unhealthy alcohol use using a single question and scores less than 2 times per year Quality 130: Documentation Of Current Medications In The Medical Record: Current Medications Documented Quality 226: Preventive Care And Screening: Tobacco Use: Screening And Cessation Intervention: Patient screened for tobacco use, is a smoker AND received Cessation Counseling Detail Level: Detailed > 2

## 2020-05-21 ENCOUNTER — APPOINTMENT (OUTPATIENT)
Dept: CARDIOLOGY | Facility: CLINIC | Age: 85
End: 2020-05-21

## 2021-03-29 NOTE — ED PROVIDER NOTE - NS_BEDUNITTYPES_ED_ALL_ED
TELEMETRY
1. return for worsening symptoms or anything concerning to you  2. take all home meds as prescribed  3. follow up with your pmd call to make an appointment  4. follw up with cardiology tomorrow  5. you were offered admission but wanted to go home. if you want return for further cardiac workup.   Chest Pain    WHAT YOU NEED TO KNOW:    Chest pain can be caused by a range of conditions, from not serious to life-threatening. Chest pain can be a symptom of a digestive problem, such as acid reflux or a stomach ulcer. An anxiety attack or a strong emotion, such as anger, can also cause chest pain. Infection, inflammation, or a fracture in the bones or cartilage in your chest can cause pain or discomfort. Sometimes chest pain or pressure is caused by poor blood flow to your heart (angina). Chest pain may also be caused by life-threatening conditions such as a heart attack or blood clot in your lungs.     DISCHARGE INSTRUCTIONS:    Call 911 if:     You have any of the following signs of a heart attack:   Squeezing, pressure, or pain in your chest       and any of the following:   Discomfort or pain in your back, neck, jaw, stomach, or arm       Shortness of breath      Nausea or vomiting      Lightheadedness or a sudden cold sweat        Return to the emergency department if:     You have chest discomfort that gets worse, even with medicine.      You cough or vomit blood.       Your bowel movements are black or bloody.       You cannot stop vomiting, or it hurts to swallow.     Contact your healthcare provider if:     You have questions or concerns about your condition or care.        Medicines:     Medicines may be given to treat the cause of your chest pain. Examples include pain medicine, anxiety medicine, or medicines to increase blood flow to your heart.       Do not take certain medicines without asking your healthcare provider first. These include NSAIDs, herbal or vitamin supplements, or hormones (estrogen or progestin).       Take your medicine as directed. Contact your healthcare provider if you think your medicine is not helping or if you have side effects. Tell him or her if you are allergic to any medicine. Keep a list of the medicines, vitamins, and herbs you take. Include the amounts, and when and why you take them. Bring the list or the pill bottles to follow-up visits. Carry your medicine list with you in case of an emergency.    Follow up with your healthcare provider within 72 hours, or as directed: You may need to return for more tests to find the cause of your chest pain. You may be referred to a specialist, such as a cardiologist or gastroenterologist. Write down your questions so you remember to ask them during your visits.     Healthy living tips: The following are general healthy guidelines. If your chest pain is caused by a heart problem, your healthcare provider will give you specific guidelines to follow.    Do not smoke. Nicotine and other chemicals in cigarettes and cigars can cause lung and heart damage. Ask your healthcare provider for information if you currently smoke and need help to quit. E-cigarettes or smokeless tobacco still contain nicotine. Talk to your healthcare provider before you use these products.       Eat a variety of healthy, low-fat, low-salt foods. Healthy foods include fruits, vegetables, whole-grain breads, low-fat dairy products, beans, lean meats, and fish. Ask for more information about a heart healthy diet.      Drink plenty of water every day. Your body is made of mostly water. Water helps your body to control your temperature and blood pressure. Ask your healthcare provider how much water you should drink every day.      Ask about activity. Your healthcare provider will tell you which activities to limit or avoid. Ask when you can drive, return to work, and have sex. Ask about the best exercise plan for you.      Maintain a healthy weight. Ask your healthcare provider how much you should weigh. Ask him or her to help you create a weight loss plan if you are overweight.       Get the flu and pneumonia vaccines. All adults should get the influenza (flu) vaccine. Get it every year as soon as it becomes available. The pneumococcal vaccine is given to adults aged 65 years or older. The vaccine is given every 5 years to prevent pneumococcal disease, such as pneumonia.    If you have a stent:     Carry your stent card with you at all times.       Let all healthcare providers know that you have a stent.

## 2021-06-22 NOTE — PATIENT PROFILE ADULT - ANY IMPAIRED UPPER EXTREMITY FUNCTION WITHIN A WEEK PRIOR TO ADMISSION RELATED TO?
Please call if patient does not view results.  Kiara Quintana, CNP  
Please call if patient does not view results.  Kiara Quintana, CNP  
no

## 2022-03-28 NOTE — H&P ADULT - NSHPLANGLIMITEDENGLISH_GEN_A_CORE
Chief Complaint   Patient presents with    Multiple medical problems     History of presenting illness:  Romeo Hare is a72 y.o. female who presents today for follow up on her chronic medical conditions as noted below. Patient Active Problem List    Diagnosis Date Noted    Acute sinusitis 10/25/2021    Osteopenia of left hip 02/09/2021     Overview Note:     2/2021 hip neck -1.7      Type 2 diabetes mellitus without complication, without long-term current use of insulin ([facility] [address] 11/23/2020    RLS (restless legs syndrome) 11/23/2020    Vitamin D deficiency 11/23/2020    Nausea 08/22/2013    Gastroesophageal reflux disease without esophagitis 08/22/2013    History of Helicobacter pylori infection 08/22/2013    History of colon polyps 08/22/2013    Hiatal hernia 08/22/2013    Diverticulosis 08/22/2013     Past Medical History:   Diagnosis Date    GERD (gastroesophageal reflux disease)     Helicobacter Pylori (H. Pylori) Infection     Hyperlipidemia       Past Surgical History:   Procedure Laterality Date    CHOLECYSTECTOMY      COLONOSCOPY  2017    nanda    HYSTERECTOMY      ALEX BSO    KNEE GANGLION SURGERY      NECK SURGERY      Tumor removal    UPPER GASTROINTESTINAL ENDOSCOPY  ?     [facility]     Current Outpatient Medications   Medication Sig Dispense Refill    Dulaglutide (TRULICITY) 3 VT/4.6VJ SOPN Inject 3 mg into the skin once a week      ezetimibe (ZETIA) 10 MG tablet TAKE 1 TABLET DAILY 90 tablet 3    SITagliptin (JANUVIA) 50 MG tablet Take 1 tablet by mouth 2 times daily 180 tablet 1    glipiZIDE (GLUCOTROL) 5 MG tablet Take 1 tablet by mouth 2 times daily 180 tablet 1    esomeprazole (NEXIUM) 40 MG delayed release capsule Take 1 capsule by mouth every morning (before breakfast) 90 capsule 1    atorvastatin (LIPITOR) 80 MG tablet TAKE 1 TABLET DAILY 90 tablet 1    blood glucose test strips (ASCENSIA AUTODISC VI;ONE TOUCH ULTRA TEST VI) strip 1 each by In Vitro route daily As needed. 100 each 3    vitamin D (ERGOCALCIFEROL) 1.25 MG (34759 UT) CAPS capsule Take 1 capsule by mouth every 14 days 6 capsule 3    nitrofurantoin, macrocrystal-monohydrate, (MACROBID) 100 MG capsule Take 1 tablet by mouth twice weekly 24 capsule 2    vitamin B-12 (CYANOCOBALAMIN) 1000 MCG tablet Take 1,000 mcg by mouth daily      Calcium Carb-Cholecalciferol (CALCIUM 1000 + D PO) Take by mouth      Biotin 1000 MCG TABS Take 1,000 Units by mouth      tiotropium (SPIRIVA) 18 MCG inhalation capsule Inhale 18 mcg into the lungs daily      polyethylene glycol (MIRALAX) 17 g PACK packet Take 17 g by mouth daily      aspirin 81 MG EC tablet Take 81 mg by mouth daily.  albuterol (PROVENTIL) (2.5 MG/3ML) 0.083% nebulizer solution Take 3 mLs by nebulization 3 times daily as needed for Wheezing (Patient not taking: Reported on 3/28/2022) 30 each 3     No current facility-administered medications for this visit. Allergies   Allergen Reactions    Amoxicillin-Pot Clavulanate     Ciprofloxacin     Codeine     Sulfa Antibiotics      Social History     Tobacco Use    Smoking status: Never Smoker    Smokeless tobacco: Never Used   Substance Use Topics    Alcohol use: No      Family History   Problem Relation Age of Onset    Stroke Mother     Diabetes Mother     Liver Cancer Father     Breast Cancer Sister     Liver Cancer Maternal Grandfather     Colon Cancer Neg Hx     Colon Polyps Neg Hx        Review of Systems   Constitutional: Positive for fatigue. Negative for chills and fever. HENT: Negative for congestion, ear pain, nosebleeds, postnasal drip and sore throat. Respiratory: Negative for cough, chest tightness and wheezing. Cardiovascular: Negative for chest pain, palpitations and leg swelling. Gastrointestinal: Negative for abdominal pain and constipation. Genitourinary: Negative for dysuria and urgency. Musculoskeletal: Positive for arthralgias.    Skin: Negative for rash. Neurological: Negative for dizziness and headaches. Psychiatric/Behavioral: Positive for sleep disturbance. Vitals:    03/28/22 0925   BP: 112/78   Site: Left Upper Arm   Position: Sitting   Cuff Size: Large Adult   Pulse: 89   Resp: 18   SpO2: 97%   Weight: 152 lb (68.9 kg)   Height: 5' 5\" (1.651 m)     Body mass index is 25.29 kg/m². Physical Exam  Constitutional:       Appearance: She is well-developed. HENT:      Right Ear: External ear normal.      Left Ear: External ear normal.      Mouth/Throat:      Pharynx: No oropharyngeal exudate. Eyes:      Conjunctiva/sclera: Conjunctivae normal.      Pupils: Pupils are equal, round, and reactive to light. Neck:      Thyroid: No thyromegaly. Vascular: No JVD. Cardiovascular:      Rate and Rhythm: Normal rate. Heart sounds: Normal heart sounds. No murmur heard. Pulmonary:      Effort: No respiratory distress. Breath sounds: Normal breath sounds. No wheezing or rales. Chest:      Chest wall: No tenderness. Abdominal:      General: Bowel sounds are normal.      Palpations: Abdomen is soft. Musculoskeletal:         General: Normal range of motion. Cervical back: Neck supple. Lymphadenopathy:      Cervical: No cervical adenopathy. Skin:     General: Skin is warm. Findings: No rash. Neurological:      Mental Status: She is oriented to person, place, and time.      Breast exam  Bilateral breast exam- symmetric, no nodules, no lymphadenopathy, no nipple discharge            Lab Review   Orders Only on 03/24/2022   Component Date Value    Vit D, 25-Hydroxy 03/24/2022 61.1     WBC 03/24/2022 5.4     RBC 03/24/2022 4.96     Hemoglobin 03/24/2022 14.6     Hematocrit 03/24/2022 43.8     MCV 03/24/2022 88.3     MCH 03/24/2022 29.4     MCHC 03/24/2022 33.3     RDW 03/24/2022 12.0     Platelets 84/99/1632 189     MPV 03/24/2022 9.6     Neutrophils % 03/24/2022 56.3     Lymphocytes % 03/24/2022 31.2     Monocytes % 03/24/2022 8.9     Eosinophils % 03/24/2022 2.8     Basophils % 03/24/2022 0.6     Neutrophils Absolute 03/24/2022 3.0     Immature Granulocytes # 03/24/2022 0.0     Lymphocytes Absolute 03/24/2022 1.7     Monocytes Absolute 03/24/2022 0.50     Eosinophils Absolute 03/24/2022 0.20     Basophils Absolute 03/24/2022 0.00     TSH 03/24/2022 0.877     Color, UA 03/24/2022 YELLOW     Clarity, UA 03/24/2022 Clear     Glucose, Ur 03/24/2022 Negative     Bilirubin Urine 03/24/2022 Negative     Ketones, Urine 03/24/2022 Negative     Specific Gravity, UA 03/24/2022 1.020     Blood, Urine 03/24/2022 Negative     pH, UA 03/24/2022 5.5     Protein, UA 03/24/2022 Negative     Urobilinogen, Urine 03/24/2022 0.2     Nitrite, Urine 03/24/2022 Negative     Leukocyte Esterase, Urine 03/24/2022 MODERATE*    Hemoglobin A1C 03/24/2022 7.7*    Cholesterol, Total 03/24/2022 146*    Triglycerides 03/24/2022 137     HDL 03/24/2022 36*    LDL Calculated 03/24/2022 83     Sodium 03/24/2022 143     Potassium 03/24/2022 4.9     Chloride 03/24/2022 105     CO2 03/24/2022 25     Anion Gap 03/24/2022 13     Glucose 03/24/2022 159*    BUN 03/24/2022 19     CREATININE 03/24/2022 0.7     GFR Non- 03/24/2022 >60     GFR  03/24/2022 >59     Calcium 03/24/2022 10.1     Total Protein 03/24/2022 7.1     Albumin 03/24/2022 4.7     Total Bilirubin 03/24/2022 0.6     Alkaline Phosphatase 03/24/2022 99     ALT 03/24/2022 36*    AST 03/24/2022 28     Microalbumin, Random Uri* 03/24/2022 <1.20     Creatinine, Ur 03/24/2022 103.7     Microalbumin Creatinine * 03/24/2022 see below     Bacteria, UA 03/24/2022 NEGATIVE*    Crystals, UA 03/24/2022 NEG*    Hyaline Casts, UA 03/24/2022 3     WBC, UA 03/24/2022 6*    RBC, UA 03/24/2022 1     Epithelial Cells, UA 03/24/2022 1            ASSESSMENT/PLAN:    Type 2 diabetes  Hyperglycemia  Significantly elevated hemoglobin A1c at 7.7 in 3/2022  Recommendations    1.  Januvia 50 mg twice daily  2.  Trulicity taking 1.5 mg weekly   Dose change today on 3/28/2022 -increase to 3 mg weekly  Ask pt to check bs and send readings here in 2 weeks        3.  Glyburide rx  5 mg twice daily   4. Plan close fu in 3 months    RLS (restless legs syndrome)  Has been well controlled/currently on no medications  rx made her constipated     Vitamin D deficiency  Lab results reviewed with patient  vitamin D level 61 in 3/2022  Rx vit d 50,000 weekly        Interstitial cystitis  previously followed with Dr. Jameson, prior history of frequent UTIs. RX  Macrobid 100 twice weekly       Primary insomnia-   Sleep hygiene recommendations discussed with patient     Pure hypercholesterolemia  Lab results reviewed and discussed with patient  Liver functions normal  LDL is 83 in 3/2022  RX Zetia 10 mg daily  Rx Lipitor 80 mg daily  Recommend diet lower in saturated fats     Asthma  rx  dulera + spiriva ( never smoker)  Monitor closely  During spring months also suggest patient add Xyzal or Zyrtec and uses Flonase no spray and carries albuterol for as needed use    Osteopenia          Osteopenia of left hip 02/09/2021     Overview Note:     2/2021 hip neck -1.7      Recommendations at this time  #1 make sure  she takes her vitamin D supplement  #2 exercise, specifically weightbearing exercise is recommended     Repeat 3-4 yrs        History of Meniere's   Tinnitus  Does not want to see ENT this time  ( seen Arely garcia many yrs ago)  Exercise instructions for vestibular rehabilitation given to the patient and discussed         Orders Placed This Encounter   Procedures    VANESSA DIGITAL SCREEN W OR WO CAD BILATERAL    Hemoglobin A1C    Basic Metabolic Panel     New Prescriptions    No medications on file         Return for Medicare Annual Wellness Visit in 1 year.    EMR Dragon/transcription disclaimer:Significant part of this  encounter note is electronic transcription/translationof spoken language to printed text. The electronic translation of spoken language may be erroneous, or at times, nonsensical words or phrases may be inadvertently transcribed.  Although I have reviewed the note for sucherrors, some may still exist. No

## 2022-07-29 NOTE — ED PROVIDER NOTE - AGGRAVATING FACTORS
The [address] [place], [place] [address], [address], [address] [place] [address]  Phone: [phone]   Fax:     [phone]    Physical Therapy Daily Treatment Note  Date:  2022    Patient Name:  Israel Caldwell    :  2005  MRN: [id number]  Restrictions/Precautions:    Medical/Treatment Diagnosis Information:  Diagnosis: H61.956H (ICD-10-CM) - Strain of left hamstring, subsequent encounter  Treatment Diagnosis: M25.562 Left knee pain  Insurance/Certification information:  PT Insurance Information: [employer] [facility]  Physician Information: Dr. Shaffer December   Has the plan of care been signed (Y/N):        [x]  Yes  []  No     Date of Patient follow up with Physician:       Is this a Progress Report:     []  Yes  [x]  No        If Yes:  Date Range for reporting period:  Beginning   Ending    Progress report will be due (10 Rx or 30 days whichever is less):       Recertification will be due (POC Duration  / 90 days whichever is less):         Visit # Insurance Allowable Auth Required   2   40 []  Yes []  No        Functional Scale: FOTO 59    Date assessed:         Latex Allergy:  [x]NO      []YES  Preferred Language for Healthcare:   [x]English       []other:    Pain level:  2/10 7/29     SUBJECTIVE:   Pt reports felt a lot of improvement in symptoms and movement has also gotten better allowing him to fully straighten his knee. HEP is going well.      OBJECTIVE: See eval  Observation:   Test measurements:      RESTRICTIONS/PRECAUTIONS: Grade 1 hamstring strain    Exercises/Interventions:   Exercise/Equipment Resistance/Repetitions Other comments   Stretching     Hamstring 3x30\"     Towel Pull     Inclined Calf 3x30\"     Hip Flexion     ITB     Groin     Quad Prone 3x30\"  ^ 1/ roll under thigh     piriformis 30\"hx3 L Start  figure 4             SLR     Supine 2# 3x10 L ^    Abduction 2# 3x10 L ^    Adduction 2# 3x10 re-education including up and down stairs     Home Exercise Program:    [x] (85363) Reviewed/Progressed HEP activities related to strengthening, flexibility, endurance, ROM of core, proximal hip and LE for functional self-care, mobility, lifting and ambulation/stair navigation   [] (20367)Reviewed/Progressed HEP activities related to improving balance, coordination, kinesthetic sense, posture, motor skill, proprioception of core, proximal hip and LE for self care, mobility, lifting, and ambulation/stair navigation      Manual Treatments:  PROM / STM / Oscillations-Mobs:  G-I, II, III, IV (PA's, Inf., Post.)  [x] (29431) Provided manual therapy to mobilize LE, proximal hip and/or LS spine soft tissue/joints for the purpose of modulating pain, promoting relaxation,  increasing ROM, reducing/eliminating soft tissue swelling/inflammation/restriction, improving soft tissue extensibility and allowing for proper ROM for normal function with self care, mobility, lifting and ambulation. Modalities:      Charges:  Timed Code Treatment Minutes: 48'   Total Treatment Minutes: 1:00-1:55  54'       [] EVAL (LOW) 455 1011 (typically 20 minutes face-to-face)  [] EVAL (MOD) 36807 (typically 30 minutes face-to-face)  [] EVAL (HIGH) 93091 (typically 45 minutes face-to-face)  [] RE-EVAL   [x] YA(56475) x  2   [] IONTO  [] NMR (28350) x     [] VASO  [x] Manual (45578) x 1     [] Other:  [] TA x      [] Mech Traction (57296)  [] ES(attended) (14773)      [] ES (un) (30630):     HEP instruction:   Access Code: Anita Donis        GOALS:   Patient stated goal: Get ready to play football   [] Progressing: [] Met: [] Not Met: [] Adjusted    Therapist goals for Patient:   Short Term Goals: To be achieved in: 2 weeks  1. Independent in HEP and progression per patient tolerance, in order to prevent re-injury. [] Progressing: [] Met: [] Not Met: [] Adjusted   2.  Patient will have a decrease in pain to facilitate improvement in movement, function, and ADLs as indicated by Functional Deficits. [] Progressing: [] Met: [] Not Met: [] Adjusted    Long Term Goals: To be achieved in: 8 weeks  1. FOTO score at least 89 to assist with reaching prior level of function. [] Progressing: [] Met: [] Not Met: [] Adjusted  2. Patient will demonstrate increased flexibility to max potential (hamstring passive SLR at least 80 and - [place]) to allow for proper joint functioning as indicated by patients Functional Deficits. [] Progressing: [] Met: [] Not Met: [] Adjusted  3. Patient will demonstrate an increase in Strength to good proximal hip strength and control  in LE to allow for proper functional mobility as indicated by patients Functional Deficits. [] Progressing: [] Met: [] Not Met: [] Adjusted  4. Patient will return to daily functional activities (walking, prolonged sitting > at least 30 minutes) without increased symptoms or restriction. [] Progressing: [] Met: [] Not Met: [] Adjusted  5. Patient will be able to participate in football practices without increased symptoms or restriction. [] Progressing: [] Met: [] Not Met: [] Adjusted    Overall Progression Towards Functional goals/ Treatment Progress Update:  [] Patient is progressing as expected towards functional goals listed. [] Progression is slowed due to complexities/Impairments listed. [] Progression has been slowed due to co-morbidities.   [x] Plan just implemented, too soon to assess goals progression <30days   [] Goals require adjustment due to lack of progress  [] Patient is not progressing as expected and requires additional follow up with physician  [] Other    Prognosis for POC: [x] Good [] Fair  [] Poor      Patient requires continued skilled intervention: [x] Yes  [] No    Treatment/Activity Tolerance:  [x] Patient able to complete treatment  [] Patient limited by fatigue  [] Patient limited by pain     [] Patient limited by other medical complications  [] Other: 7/29 Pt appears to be responding well to treatment with reported decrease in pain and increase in mobility. Tightness noted at medial aspect mid belly of L hamstring that responded well to IASTM. Responded well to progression of exercises with appropriate fatigue and no reported increase in symptoms. Patient Education:              7/29 Updated HEP  7/27 Patient education on PT and plan of care including diagnosis, prognosis, treatment goals and options. Patient agrees with discussed POC and treatment and is aware of rehab process. Pt was also educated on clinic layout and use of modalities. PLAN: See eval  [x] Continue per plan of care [] Alter current plan (see comments above)  [x] Plan of care initiated [] Hold pending MD visit [] Discharge      Electronically signed by:  Matthew Mckeon PT    Note: If patient does not return for scheduled/ recommended follow up visits, this note will serve as a discharge from care along with most recent update on progress. none

## 2023-12-11 NOTE — DISCHARGE NOTE PROVIDER - NSDCADMDATE_GEN_ALL_CORE_FT
Patient was contacted and informed that our Diabetic education department has been reaching out in attempts to schedule.     Patient was given central scheduling phons number as well at [phone] number to reach back out and schedule.     Patient stated he will call back to schedule.   
Please call patient and ask him to schedule with diabetic educator as requested by both myself and Dr. Gunn.  
[facility] has been removed as, unable to contact. Ordering provider has been notified.    Please contact patient, if further communication is needed.   
06-Jan-2020 10:28

## 2024-03-01 NOTE — ED PROVIDER NOTE - NS ED MD DISPO SPECIAL CONSIDERATION1
[facility], [facility], [place]  [address] [facility] [address] [place] [id number]  Dept: [phone]       Patient:  Kaylee Deshpande  :      1998  MRN:      [id number]    Chief Complaint:    Chief Complaint   Patient presents with    Follow - Up    Weight Management     Weight check        SUBJECTIVE     History of Present Illness:  Kaylee is being seen today for a follow-up for non surgical weight loss    Past Medical History:   Past Medical History:   Diagnosis Date    Acne     Anemia 2017    iron deficiency    Chronic ITP (idiopathic thrombocytopenia) (HCC) 2017    Constipation     Eczema     Encounter for medication monitoring 2018    History of attention deficit disorder 2021    History of ITP 2017    Hx of motion sickness     IBS (irritable bowel syndrome)     Medication monitoring encounter 2021    Overweight (BMI 25.0-29.9)         Comorbidities:  Back pain-Improvement?  yes and Joint pain-Improvement?  yes    OBJECTIVE     Vitals: /72   Pulse 78   Ht 5' 5.7\" (1.669 m)   Wt 164 lb 6.4 oz (74.6 kg)   LMP 2023 (Approximate)   SpO2 98%   BMI 26.78 kg/m²     Initial weight loss: -09   Total weight loss: -09    Start weight: 173    Wt Readings from Last 3 Encounters:   24 164 lb 6.4 oz (74.6 kg)   10/20/23 173 lb 4.8 oz (78.6 kg)   23 178 lb (80.7 kg)       Patient Medications:    Current Outpatient Medications   Medication Sig Dispense Refill    Phentermine HCl 37.5 MG Oral Tab Take 1 tablet (37.5 mg total) by mouth every morning before breakfast. 30 tablet 2    ketoconazole 2 % External Shampoo Apply to scalp 2 times per week. 120 mL 11    Fluocinonide 0.05 % External Solution Use to scaly areas of scalp bid 60 mL 2    Clindamycin Phos-Benzoyl Perox 1.2-5 % External Gel Use every day for acne on face and neck 45 g 6    Azelaic Acid (FINACEA) 15 % External Gel Use qam as directed to face 50 g 6     spironolactone 50 MG Oral Tab Take 1 tablet (50 mg total) by mouth daily. 90 tablet 3    Tazarotene (TAZORAC) 0.1 % External Cream Use qhs for acne 60 g 6    ASHWAGANDHA OR Take by mouth.       Allergies:  Bactrim [sulfamethoxazole w/trimethoprim]     Social History:    Social History     Socioeconomic History    Marital status: Single     Spouse name: Not on file    Number of children: Not on file    Years of education: Not on file    Highest education level: Not on file   Occupational History    Not on file   Tobacco Use    Smoking status: Never     Passive exposure: Never    Smokeless tobacco: Never   Vaping Use    Vaping Use: Never used   Substance and Sexual Activity    Alcohol use: No     Alcohol/week: 0.0 standard drinks of alcohol    Drug use: No    Sexual activity: Not on file   Other Topics Concern    Grew up on a farm No    History of tanning No    Outdoor occupation No    Pt has a pacemaker No    Pt has a defibrillator No    Breast feeding No    Reaction to local anesthetic No     Service Not Asked    Blood Transfusions Not Asked    Caffeine Concern Yes     Comment: Coffee seldom    Occupational Exposure Not Asked    Hobby Hazards Not Asked    Sleep Concern Not Asked    Stress Concern Not Asked    Weight Concern Not Asked    Special Diet Not Asked    Back Care Not Asked    Exercise Not Asked    Bike Helmet Not Asked    Seat Belt Not Asked    Self-Exams Not Asked   Social History Narrative    Not on file     Social Determinants of Health     Financial Resource Strain: Not on file   Food Insecurity: Not on file   Transportation Needs: Not on file   Physical Activity: Not on file   Stress: Not on file   Social Connections: Not on file   Housing Stability: Not on file     Surgical History:  History reviewed. No pertinent surgical history.  Family History:    Family History   Problem Relation Age of Onset    Heart Disorder Father 34        CVA    Diabetes Mother     Lipids Other     Asthma Other      Hypertension Other     Heart Disorder Other        Food Journal  Reviewed and Discussed:       Patient has a Food Journal?: yes   Patient is reading nutrition labels?  yes  Average Caloric Intake:     Average CHO Intake: 100  Is patient exercising? yes  Type of exercise? [employer]  weights    Eating Habits  Patient states the following:  Eats 2 meal(s) per day  Length of time it takes to consume a meal:  20  # of snacks per day: 1 Type of snacks:  fruit  Amount of soda consumption per day:  diet  Amount of water (in ounces) per day:  64  Drinking between meals only:  yes  Toughest challenge:      Nutritional Goals  Limit carbohydrates to 100 gms per day, Eat 100-200 calories within 1 hour of waking , and Eat 3-4 cups of fresh fruits or vegetables daily    Behavior Modifications Reviewed and Discussed  Eat breakfast, Eat 3 meals per day, Plan meals in advance, Read nutrition labels, Drink 64 oz of water per day, Maintain a daily food journal, No drinking 30 minutes before or after meals, Utlize portion control strategies to reduce calorie intake, Identify triggers for eating and manage cues, and Eat slowly and take 20 to 30 minutes to complete each meal    Exercise Goals Reviewed and Discussed    Continue [employer]    ROS:    Constitutional: negative  Respiratory: negative  Cardiovascular: negative  Gastrointestinal: negative  Musculoskeletal:negative  Neurological: negative  Behavioral/Psych: negative  Endocrine: negative  All other systems were reviewed and are negative    Physical Exam:   General appearance: alert, appears stated age, and cooperative  Head: Normocephalic, without obvious abnormality, atraumatic  Back: symmetric, no curvature. ROM normal. No CVA tenderness.  Lungs: clear to auscultation bilaterally  Heart: S1, S2 normal, no murmur, click, rub or gallop, regular rate and rhythm  Abdomen: soft, non-tender; bowel sounds normal; no masses,  no organomegaly  Extremities: extremities normal,  atraumatic, no cyanosis or edema  Pulses: 2+ and symmetric  Skin: Skin color, texture, turgor normal. No rashes or lesions  Neurologic: Grossly normal    ASSESSMENT       Encounter Diagnosis(ses):   Encounter Diagnoses   Name Primary?    Pre-diabetes Yes    Encounter for therapeutic drug monitoring     Overweight (BMI 25.0-29.9)        PLAN     Patient is not interested in bariatric surgery. Patient desires to pursue traditional weight loss at this time.      Pre diabetes: should improve with diet and exercise    Goals for next month:  1. Keep a food log.  2. Drink 48-64 ounces of non-caloric beverages per day. No fruit juices or regular soda.  3. Increase activity-upper body exercises, walk 10 minutes per day.  4. Increase fruit and vegetable servings to 5-6 per day.      Tolerating Phentermine  Refill at current dose  EKG done    Continue to work out    Diagnoses and all orders for this visit:    Pre-diabetes    Encounter for therapeutic drug monitoring    Overweight (BMI 25.0-29.9)          Rudolph Schroeder MD   None

## 2024-06-19 NOTE — H&P ADULT - NSHPPOAPULMEMBOLUS_GEN_A_CORE
[FreeTextEntry3] :   Documented by Amadeo Schwartz acting as a scribe for Dr. Will Oglesby. 06/18/2024   All medical record entries made by the Amadeo Schwartz (Scribe) were at my, Dr. Will Oglesby. direction and personally dictated by me on 06/18/2024. I have reviewed the chart and agree that the record accurately reflects my personal performance of the history, physical exam, assessment and plan. I have also personally directed, reviewed, and agreed with the chart.  Documented by Aubrie Castro acting as a scribe for Dr. Will Oglesby. 6/18/2024   All medical record entries made by the Scribe were at my, Dr. Will Oglesby. direction and personally dictated by me on 6/18/2024. I have reviewed the chart and agree that the record accurately reflects my personal performance of the history, physical exam, assessment and plan. I have also personally directed, reviewed, and agreed with the chart.
no

## 2025-01-06 NOTE — DISCHARGE NOTE NURSING/CASE MANAGEMENT/SOCIAL WORK - NSDCPETBCESMAN_GEN_ALL_CORE
----- Message from Brenda Canales DO sent at 1/6/2025  2:53 PM CST -----  Creatinine improved. GFR improved. The rest of her labs were normal. I recommend we repeat a BMP  in 3 months to re-evaluate.   
----- Message from Brenda Canales DO sent at 1/6/2025 11:56 AM CST -----  The patient's urine culture showed small amount of gram positive tea which could be from skin contamination. Can we see how the patient is feeling. I like to repeat this. Also we could repeat her CT of her kidneys to make sure her pyelonephritis is improving or resolved. If the family agrees I can place a repeat CT order.   
Writer called and spoke with patient's daughter Viki (verbal authorization noted in chart). Writer advised patient's daughter of test results and MD recommendation. Patient's daughter states understanding and verbalizes no further questions at this time.     Viki declines CT at this time. Viki will monitor patient for any signs of Pyelonephritis and contact office for repeat CT if having any symptoms. Currently patient is not having any urinary concerns either. BMP ordered for in 3 months.     
If you are a smoker, it is important for your health to stop smoking. Please be aware that second hand smoke is also harmful.
